# Patient Record
Sex: FEMALE | Race: WHITE | Employment: OTHER | URBAN - METROPOLITAN AREA
[De-identification: names, ages, dates, MRNs, and addresses within clinical notes are randomized per-mention and may not be internally consistent; named-entity substitution may affect disease eponyms.]

---

## 2017-01-02 ENCOUNTER — OFFICE VISIT (OUTPATIENT)
Dept: WOUND CARE | Facility: HOSPITAL | Age: 67
End: 2017-01-02
Attending: NURSE PRACTITIONER

## 2017-01-02 DIAGNOSIS — IMO0001 CHRONIC VENOUS HYPERTENSION WITH ULCER INVOLVING LEFT SIDE: Primary | ICD-10-CM

## 2017-01-02 DIAGNOSIS — I73.9 PERIPHERAL VASCULAR DISEASE, UNSPECIFIED (HCC): ICD-10-CM

## 2017-01-02 DIAGNOSIS — I87.312 CHRONIC VENOUS HYPERTENSION (IDIOPATHIC) WITH ULCER OF LEFT LOWER EXTREMITY (HCC): ICD-10-CM

## 2017-01-02 DIAGNOSIS — L97.929 CHRONIC VENOUS HYPERTENSION (IDIOPATHIC) WITH ULCER OF LEFT LOWER EXTREMITY (HCC): ICD-10-CM

## 2017-01-02 PROCEDURE — 29581 APPL MULTLAYER CMPRN SYS LEG: CPT

## 2017-01-04 ENCOUNTER — TELEPHONE (OUTPATIENT)
Dept: FAMILY MEDICINE CLINIC | Facility: CLINIC | Age: 67
End: 2017-01-04

## 2017-01-04 DIAGNOSIS — I10 HYPERTENSION GOAL BP (BLOOD PRESSURE) < 130/80: Primary | Chronic | ICD-10-CM

## 2017-01-04 RX ORDER — VERAPAMIL HYDROCHLORIDE 240 MG/1
240 CAPSULE, EXTENDED RELEASE ORAL NIGHTLY
Qty: 90 CAPSULE | Refills: 0 | Status: SHIPPED | OUTPATIENT
Start: 2017-01-04 | End: 2017-04-07

## 2017-01-04 NOTE — TELEPHONE ENCOUNTER
Gina Biswas is requesting a refill of Verapamil  mg. LOV 11/19/16. Has upcoming visit 2/22/17. Last CMP was 11/11/16. Medication refilled per protocol and  order #90 with 0 additional refills to [facility].

## 2017-01-04 NOTE — TELEPHONE ENCOUNTER
Patient needs her medication refilled and called into [facility]. Verapamil HCl (Tab CR) CALAN- MG     Please call if you have any questions.

## 2017-01-05 ENCOUNTER — OFFICE VISIT (OUTPATIENT)
Dept: WOUND CARE | Facility: HOSPITAL | Age: 67
End: 2017-01-05
Attending: NURSE PRACTITIONER

## 2017-01-05 DIAGNOSIS — L97.929 CHRONIC VENOUS HYPERTENSION (IDIOPATHIC) WITH ULCER OF LEFT LOWER EXTREMITY (HCC): ICD-10-CM

## 2017-01-05 DIAGNOSIS — I87.312 CHRONIC VENOUS HYPERTENSION (IDIOPATHIC) WITH ULCER OF LEFT LOWER EXTREMITY (HCC): ICD-10-CM

## 2017-01-05 DIAGNOSIS — I73.9 PERIPHERAL VASCULAR DISEASE, UNSPECIFIED (HCC): ICD-10-CM

## 2017-01-05 DIAGNOSIS — IMO0001 CHRONIC VENOUS HYPERTENSION WITH ULCER INVOLVING LEFT SIDE: Primary | ICD-10-CM

## 2017-01-05 PROCEDURE — 29581 APPL MULTLAYER CMPRN SYS LEG: CPT

## 2017-01-05 PROCEDURE — 97597 DBRDMT OPN WND 1ST 20 CM/<: CPT

## 2017-01-05 PROCEDURE — 97598 DBRDMT OPN WND ADDL 20CM/<: CPT

## 2017-01-05 NOTE — PROGRESS NOTES
Progress Note Details  Patient Name: Kaelyn Andres Date: 1/5/2017   Patient Number: [id number] Physician / Shawn Garcia: Milka Galvan   Patient YOB: 1950 Facility: [facility]    Chief Complaint  This information was obta 11/18/16 was switched to silver gel and ace wrap. Area is doing better. 11/25/16 Compliance an issue. discussed at length and agreeable to unna  12/2/2016 Kept compression on. continued treatment plan.   12/13/16 Miconazole powder, Silver restore, compr benazepril 20 mg tablet oral tablet oral  atenolol 50 mg tablet oral 1 1 tablet oral once daily  verapamil ER (SR) 240 mg tablet,extended release oral 1 1 tablet extended release oral once daily  clindamycin 300 mg capsule oral 1 1 capsule oral every 6 edson The periwound skin exhibited: Edema, Hemosiderosis. The periwound skin did not exhibit: Moist, Maceration. The temperature of the periwound skin is WNL. Periwound skin does not exhibit signs or symptoms of infection. Local Pulse is Weak.     Psychiatric:  J Wound #1 (Diabetic Ulcer) is located on the left, anterior lower leg. A Multi-Layer Compression Wrap procedure was performed by Aurora Banks RN. A 2 Layers Unna Boot was applied with moderate 15-25 mmhg. The procedure was tolerated well.         Plan

## 2017-01-09 ENCOUNTER — OFFICE VISIT (OUTPATIENT)
Dept: WOUND CARE | Facility: HOSPITAL | Age: 67
End: 2017-01-09
Attending: NURSE PRACTITIONER
Payer: MEDICARE

## 2017-01-09 DIAGNOSIS — IMO0001 CHRONIC VENOUS HYPERTENSION WITH ULCER INVOLVING LEFT SIDE: Primary | ICD-10-CM

## 2017-01-09 DIAGNOSIS — L97.929 CHRONIC VENOUS HYPERTENSION (IDIOPATHIC) WITH ULCER OF LEFT LOWER EXTREMITY (HCC): ICD-10-CM

## 2017-01-09 DIAGNOSIS — I73.9 PERIPHERAL VASCULAR DISEASE, UNSPECIFIED (HCC): ICD-10-CM

## 2017-01-09 DIAGNOSIS — I87.312 CHRONIC VENOUS HYPERTENSION (IDIOPATHIC) WITH ULCER OF LEFT LOWER EXTREMITY (HCC): ICD-10-CM

## 2017-01-09 PROCEDURE — 29581 APPL MULTLAYER CMPRN SYS LEG: CPT

## 2017-01-12 ENCOUNTER — OFFICE VISIT (OUTPATIENT)
Dept: WOUND CARE | Facility: HOSPITAL | Age: 67
End: 2017-01-12
Attending: NURSE PRACTITIONER
Payer: MEDICARE

## 2017-01-12 DIAGNOSIS — L97.929 CHRONIC VENOUS HYPERTENSION (IDIOPATHIC) WITH ULCER OF LEFT LOWER EXTREMITY (HCC): ICD-10-CM

## 2017-01-12 DIAGNOSIS — I87.312 CHRONIC VENOUS HYPERTENSION (IDIOPATHIC) WITH ULCER OF LEFT LOWER EXTREMITY (HCC): ICD-10-CM

## 2017-01-12 DIAGNOSIS — R60.1 GENERALIZED EDEMA: ICD-10-CM

## 2017-01-12 DIAGNOSIS — I73.9 PERIPHERAL VASCULAR DISEASE, UNSPECIFIED (HCC): ICD-10-CM

## 2017-01-12 DIAGNOSIS — IMO0001 CHRONIC VENOUS HYPERTENSION WITH ULCER INVOLVING LEFT SIDE: Primary | ICD-10-CM

## 2017-01-12 PROCEDURE — 29581 APPL MULTLAYER CMPRN SYS LEG: CPT

## 2017-01-12 PROCEDURE — 97597 DBRDMT OPN WND 1ST 20 CM/<: CPT

## 2017-01-13 NOTE — PROGRESS NOTES
Progress Note Details  Patient Name: Babs Cotter Date: 1/12/2017   Patient Number: [id number] Physician / Rizwana Li: Callie Chu   Patient YOB: 1950 Facility: [facility]    Chief Complaint  This information was obt 11/18/16 was switched to silver gel and ace wrap. Area is doing better. 11/25/16 Compliance an issue. discussed at length and agreeable to unna  12/2/2016 Kept compression on. continued treatment plan.   12/13/16 Miconazole powder, Silver restore, compr atorvastatin 40 mg tablet oral 1 1 tablet oral once daily  benazepril 20 mg tablet oral tablet oral  atenolol 50 mg tablet oral 1 1 tablet oral once daily  verapamil ER (SR) 240 mg tablet,extended release oral 1 1 tablet extended release oral once daily  M The periwound skin exhibited: Hemosiderosis. The periwound skin did not exhibit: Edema, Moist, Maceration. The temperature of the periwound skin is WNL. Periwound skin does not exhibit signs or symptoms of infection. Local Pulse is Weak.     Psychiatric:  J Wound #1 (Diabetic Ulcer) is located on the left, anterior lower leg. A Multi-Layer Compression Wrap procedure was performed by Dawit Whittaker [address] [place] [address] Amelie Kaba. A 2 Layers [place] was applied with . The procedure was tolerated well. Plan    Wound Orders:   Wo

## 2017-01-17 ENCOUNTER — OFFICE VISIT (OUTPATIENT)
Dept: WOUND CARE | Facility: HOSPITAL | Age: 67
End: 2017-01-17
Attending: NURSE PRACTITIONER
Payer: MEDICARE

## 2017-01-17 DIAGNOSIS — I87.312 CHRONIC VENOUS HYPERTENSION (IDIOPATHIC) WITH ULCER OF LEFT LOWER EXTREMITY (HCC): ICD-10-CM

## 2017-01-17 DIAGNOSIS — L97.929 CHRONIC VENOUS HYPERTENSION (IDIOPATHIC) WITH ULCER OF LEFT LOWER EXTREMITY (HCC): ICD-10-CM

## 2017-01-17 DIAGNOSIS — IMO0001 CHRONIC VENOUS HYPERTENSION WITH ULCER INVOLVING LEFT SIDE: Primary | ICD-10-CM

## 2017-01-17 DIAGNOSIS — I73.9 PERIPHERAL VASCULAR DISEASE, UNSPECIFIED (HCC): ICD-10-CM

## 2017-01-17 PROCEDURE — 29581 APPL MULTLAYER CMPRN SYS LEG: CPT

## 2017-01-17 PROCEDURE — 97597 DBRDMT OPN WND 1ST 20 CM/<: CPT

## 2017-01-24 ENCOUNTER — OFFICE VISIT (OUTPATIENT)
Dept: WOUND CARE | Facility: HOSPITAL | Age: 67
End: 2017-01-24
Attending: NURSE PRACTITIONER
Payer: MEDICARE

## 2017-01-24 ENCOUNTER — TELEPHONE (OUTPATIENT)
Dept: FAMILY MEDICINE CLINIC | Facility: CLINIC | Age: 67
End: 2017-01-24

## 2017-01-24 DIAGNOSIS — L97.929 CHRONIC VENOUS HYPERTENSION (IDIOPATHIC) WITH ULCER OF LEFT LOWER EXTREMITY (HCC): ICD-10-CM

## 2017-01-24 DIAGNOSIS — I87.312 CHRONIC VENOUS HYPERTENSION (IDIOPATHIC) WITH ULCER OF LEFT LOWER EXTREMITY (HCC): ICD-10-CM

## 2017-01-24 DIAGNOSIS — IMO0001 CHRONIC VENOUS HYPERTENSION WITH ULCER INVOLVING LEFT SIDE: Primary | ICD-10-CM

## 2017-01-24 DIAGNOSIS — R29.898 WEAKNESS OF LEFT LEG: Primary | ICD-10-CM

## 2017-01-24 DIAGNOSIS — M62.58 MUSCLE ATROPHY OF LOWER EXTREMITY: ICD-10-CM

## 2017-01-24 DIAGNOSIS — I73.9 PERIPHERAL VASCULAR DISEASE, UNSPECIFIED (HCC): ICD-10-CM

## 2017-01-24 PROCEDURE — 99213 OFFICE O/P EST LOW 20 MIN: CPT

## 2017-01-24 NOTE — PROGRESS NOTES
Progress Note Details  Patient Name: Singh Dee Date: 1/24/2017   Patient Number: [id number] Physician / Thomas Obrien: Kenji Nieto   Patient YOB: 1950 Facility: [facility]    Chief Complaint  This information was obt and agreeable to unna  12/2/2016 Kept compression on. continued treatment plan. 12/13/16 Miconazole powder, Silver restore, compression. Clindamycin. 12/22/16: Improvement with miconazole powder. Seeing Pinsky in January.   12/29/16: Increased epithelial tablet oral  atenolol 50 mg tablet oral 1 1 tablet oral once daily  verapamil ER (SR) 240 mg tablet,extended release oral 1 1 tablet extended release oral once daily  Multiple Vitamins tablet oral 1 1 tablet oral once daily  clopidogrel 75 mg tablet oral 1 interactions and affect.         Assessment    Active Problems    ICD-10  (Encounter Diagnosis) I73.9 - Peripheral vascular disease, unspecified  (Encounter Diagnosis) I87.312 - Chronic venous hypertension (idiopathic) with ulcer of left lower extremity  (E she was placed in double layer tubigrip. Verbalized understanding.   Electronic Signature(s)   Signed By:  Date:    Pooja Lanier MSN, FNP-BC, CWON, CHRN 01/24/2017 61:66:06        Entered By: Pooja Lanier on 01/24/2017 09:14:16

## 2017-01-24 NOTE — TELEPHONE ENCOUNTER
Pt called to ask if Dr Donavan Severe would order some PT sessions for left leg muscle atrophy. Pt is interested in [facility], but with insurance Pt has, she must use [address] bebe Fajardo in referral. Will you approve PT pended order? Routed to Dr Donavan Severe.

## 2017-01-31 ENCOUNTER — OFFICE VISIT (OUTPATIENT)
Dept: WOUND CARE | Facility: HOSPITAL | Age: 67
End: 2017-01-31
Attending: NURSE PRACTITIONER
Payer: MEDICARE

## 2017-01-31 DIAGNOSIS — I73.9 PERIPHERAL VASCULAR DISEASE, UNSPECIFIED (HCC): ICD-10-CM

## 2017-01-31 DIAGNOSIS — IMO0001 CHRONIC VENOUS HYPERTENSION WITH ULCER INVOLVING LEFT SIDE: Primary | ICD-10-CM

## 2017-01-31 DIAGNOSIS — L97.929 CHRONIC VENOUS HYPERTENSION (IDIOPATHIC) WITH ULCER OF LEFT LOWER EXTREMITY (HCC): ICD-10-CM

## 2017-01-31 DIAGNOSIS — I87.312 CHRONIC VENOUS HYPERTENSION (IDIOPATHIC) WITH ULCER OF LEFT LOWER EXTREMITY (HCC): ICD-10-CM

## 2017-01-31 PROCEDURE — 99214 OFFICE O/P EST MOD 30 MIN: CPT

## 2017-01-31 PROCEDURE — 29581 APPL MULTLAYER CMPRN SYS LEG: CPT

## 2017-01-31 NOTE — PROGRESS NOTES
Progress Note Details  Patient Name: Coleman Pereira Date: 1/31/2017   Patient Number: [id number] Physician / Rolando Bermudez: Faizan Saini   Patient YOB: 1950 Facility: [facility]    Chief Complaint  This information was obt 11/25/16 Compliance an issue. discussed at length and agreeable to unna  12/2/2016 Kept compression on. continued treatment plan. 12/13/16 Miconazole powder, Silver restore, compression. Clindamycin. 12/22/16: Improvement with miconazole powder.  Seeing P Actos 45 mg tablet oral 1 1 tablet oral once daily  atorvastatin 40 mg tablet oral 1 1 tablet oral once daily  benazepril 20 mg tablet oral tablet oral  atenolol 50 mg tablet oral 1 1 tablet oral once daily  verapamil ER (SR) 240 mg tablet,extended release The periwound skin color is normal. The periwound skin exhibited: Edema. The periwound skin did not exhibit: Moist. The temperature of the periwound skin is Warm. Periwound skin does not exhibit signs or symptoms of infection.     Psychiatric:  Judgment and Patient doing well. New ulcer opened up to the left lower posterior leg. Concern is the increased edema due to the removal of the compression wrap. She will be placed back into the compression wrap for this next week.  She will be traveling to [facility] on Feb

## 2017-02-01 ENCOUNTER — TELEPHONE (OUTPATIENT)
Dept: FAMILY MEDICINE CLINIC | Facility: CLINIC | Age: 67
End: 2017-02-01

## 2017-02-01 NOTE — TELEPHONE ENCOUNTER
Patient is needing a referral covering all her wound care visits which started in October 2016.  Patient received a bill for over $6000.00 because her insurance denied all her visits as there was no referral

## 2017-02-02 PROBLEM — B35.1 ONYCHOMYCOSIS: Status: ACTIVE | Noted: 2017-02-02

## 2017-02-02 PROBLEM — E11.8 TYPE 2 DIABETES MELLITUS WITH COMPLICATION, WITHOUT LONG-TERM CURRENT USE OF INSULIN (HCC): Status: ACTIVE | Noted: 2017-02-02

## 2017-02-07 ENCOUNTER — OFFICE VISIT (OUTPATIENT)
Dept: WOUND CARE | Facility: HOSPITAL | Age: 67
End: 2017-02-07
Attending: NURSE PRACTITIONER
Payer: MEDICARE

## 2017-02-07 DIAGNOSIS — I87.312 CHRONIC VENOUS HYPERTENSION (IDIOPATHIC) WITH ULCER OF LEFT LOWER EXTREMITY (HCC): ICD-10-CM

## 2017-02-07 DIAGNOSIS — L97.929 CHRONIC VENOUS HYPERTENSION (IDIOPATHIC) WITH ULCER OF LEFT LOWER EXTREMITY (HCC): ICD-10-CM

## 2017-02-07 DIAGNOSIS — I73.9 PERIPHERAL VASCULAR DISEASE, UNSPECIFIED (HCC): ICD-10-CM

## 2017-02-07 DIAGNOSIS — IMO0001 CHRONIC VENOUS HYPERTENSION WITH ULCER INVOLVING LEFT SIDE: Primary | ICD-10-CM

## 2017-02-07 PROCEDURE — 29581 APPL MULTLAYER CMPRN SYS LEG: CPT

## 2017-02-07 NOTE — PROGRESS NOTES
Progress Note Details  Patient Name: Brianda Dailey Date: 2/7/2017   Patient Number: [id number] Physician / Alexus Herr: Alberto Whitaker   Patient YOB: 1950 Facility: [facility]    Chief Complaint  This information was obta 11/25/16 Compliance an issue. discussed at length and agreeable to unna  12/2/2016 Kept compression on. continued treatment plan. 12/13/16 Miconazole powder, Silver restore, compression. Clindamycin. 12/22/16: Improvement with miconazole powder.  Seeing P metformin 1,000 mg tablet oral 1 1 tablet oral twice daily  Amaryl 4 mg tablet oral 1 1 tablet oral once daily  Actos 45 mg tablet oral 1 1 tablet oral once daily  atorvastatin 40 mg tablet oral 1 1 tablet oral once daily  benazepril 20 mg tablet oral tabl The periwound skin color is normal. The periwound skin exhibited: Edema, Dry/Scaly. The periwound skin did not exhibit: Moist. The temperature of the periwound skin is Warm. Periwound skin does not exhibit signs or symptoms of infection.     Psychiatric:  J Follow-Up Appointments  Other: - Make an appointment when you return if the ulcer opens up again. Enjoy your trip!!!! Bring some sunshine back with you! Follow-Up Appointments:  A follow-up appointment should be scheduled. Patient doing well.

## 2017-02-09 NOTE — TELEPHONE ENCOUNTER
EPIC reviewed, it appears referrals we entered for Wound Care and approved, msg forwarded to Referral Dept to see if they can help.

## 2017-02-21 ENCOUNTER — HOSPITAL ENCOUNTER (OUTPATIENT)
Dept: MAMMOGRAPHY | Facility: HOSPITAL | Age: 67
Discharge: HOME | End: 2017-02-21
Attending: INTERNAL MEDICINE
Payer: MEDICARE

## 2017-02-21 ENCOUNTER — MA CHART PREP (OUTPATIENT)
Dept: FAMILY MEDICINE CLINIC | Facility: CLINIC | Age: 67
End: 2017-02-21

## 2017-02-21 DIAGNOSIS — Z85.3 PERSONAL HISTORY OF MALIGNANT NEOPLASM OF BREAST: ICD-10-CM

## 2017-02-21 PROBLEM — I73.9 PAD (PERIPHERAL ARTERY DISEASE) (HCC): Status: ACTIVE | Noted: 2017-02-21

## 2017-02-21 PROBLEM — Z90.49 STATUS POST CHOLECYSTECTOMY: Status: RESOLVED | Noted: 2017-02-21 | Resolved: 2017-02-21

## 2017-02-21 PROBLEM — D25.9 UTERINE FIBROID: Status: ACTIVE | Noted: 2017-02-21

## 2017-02-21 PROBLEM — I70.0 CALCIFICATION OF AORTA (HCC): Status: ACTIVE | Noted: 2017-02-21

## 2017-02-21 PROBLEM — N28.1 BILATERAL RENAL CYSTS: Status: ACTIVE | Noted: 2017-02-21

## 2017-02-21 PROBLEM — E11.8 TYPE 2 DIABETES MELLITUS WITH COMPLICATION, WITHOUT LONG-TERM CURRENT USE OF INSULIN (HCC): Status: RESOLVED | Noted: 2017-02-02 | Resolved: 2017-02-21

## 2017-02-21 PROBLEM — Z90.49 STATUS POST CHOLECYSTECTOMY: Status: ACTIVE | Noted: 2017-02-21

## 2017-02-21 PROBLEM — K76.0 FATTY INFILTRATION OF LIVER: Status: ACTIVE | Noted: 2017-02-21

## 2017-02-21 PROBLEM — I70.0 ATHEROSCLEROSIS OF AORTA (HCC): Status: ACTIVE | Noted: 2017-02-21

## 2017-02-21 PROBLEM — I77.9 BILATERAL CAROTID ARTERY DISEASE (HCC): Status: ACTIVE | Noted: 2017-02-21

## 2017-02-21 PROBLEM — M81.0 OSTEOPOROSIS: Status: ACTIVE | Noted: 2017-02-21

## 2017-02-21 PROBLEM — I05.8 MITRAL VALVE SCLEROSIS: Status: ACTIVE | Noted: 2017-02-21

## 2017-02-21 PROBLEM — D25.9 UTERINE FIBROID: Status: RESOLVED | Noted: 2017-02-21 | Resolved: 2017-02-21

## 2017-02-21 PROBLEM — I05.9 MITRAL ANNULAR CALCIFICATION: Status: ACTIVE | Noted: 2017-02-21

## 2017-02-21 PROBLEM — I77.1 TORTUOUS AORTA (HCC): Status: ACTIVE | Noted: 2017-02-21

## 2017-02-21 PROCEDURE — 77062 BREAST TOMOSYNTHESIS BI: CPT

## 2017-02-21 PROCEDURE — 76642 ULTRASOUND BREAST LIMITED: CPT

## 2017-02-21 PROCEDURE — 77066 DX MAMMO INCL CAD BI: CPT

## 2017-02-23 NOTE — TELEPHONE ENCOUNTER
Pt called requesting prior wound care referrals to be sent to [facility] as pt has received bills for these services. I faxed over referrals to [facility] fax # that was provided by pt and fax was sent back stating misrouted department.   Pt was called and advised

## 2017-02-24 PROBLEM — N28.1 BILATERAL RENAL CYSTS: Status: RESOLVED | Noted: 2017-02-21 | Resolved: 2017-02-24

## 2017-02-24 PROBLEM — K76.0 FATTY INFILTRATION OF LIVER: Status: RESOLVED | Noted: 2017-02-21 | Resolved: 2017-02-24

## 2017-02-27 ENCOUNTER — OFFICE VISIT (OUTPATIENT)
Dept: WOUND CARE | Facility: HOSPITAL | Age: 67
End: 2017-02-27
Attending: NURSE PRACTITIONER
Payer: MEDICARE

## 2017-02-27 DIAGNOSIS — IMO0001 CHRONIC VENOUS HYPERTENSION WITH ULCER INVOLVING LEFT SIDE: Primary | ICD-10-CM

## 2017-02-27 DIAGNOSIS — I73.9 PERIPHERAL VASCULAR DISEASE, UNSPECIFIED (HCC): ICD-10-CM

## 2017-02-27 PROCEDURE — 99214 OFFICE O/P EST MOD 30 MIN: CPT

## 2017-02-27 PROCEDURE — 29581 APPL MULTLAYER CMPRN SYS LEG: CPT

## 2017-02-27 NOTE — PROGRESS NOTES
Progress Note Details  Patient Name: Chaka Tobias Date: 2/27/2017   Patient Number: [id number] Physician / Niraj Almaraz: Pa Ridley   Patient YOB: 1950 Facility: [facility]    Chief Complaint  This information was ob 11/25/16 Compliance an issue. discussed at length and agreeable to unna  12/2/2016 Kept compression on. continued treatment plan. 12/13/16 Miconazole powder, Silver restore, compression. Clindamycin. 12/22/16: Improvement with miconazole powder.  Seeing P Weekly Nutrition Assessment  Does Patient describe adequate dietary intake?: Yes  4 servings protein daily?: Yes  5 servings fruit/veg daily? : Yes  8 – 10 cups water daily? : Yes  Patient on prescription diet?: Diabetic Diet  Patient taking supplements? : The periwound skin exhibited: Edema, Dry/Scaly. The periwound skin did not exhibit: Moist. The temperature of the periwound skin is Warm. Periwound skin does not exhibit signs or symptoms of infection.  Local Pulse is Normal.  Psychiatric:  Intact judgement Change dressing every: - once a week at the clinic. Compression Therapy:  Coflex calamine unna boot  Compression to Left Leg  Avoid prolonged standing in one place. Elevate leg(s)as much as possible.     Follow-Up Appointments  Return Appointment in 1 w

## 2017-03-06 ENCOUNTER — OFFICE VISIT (OUTPATIENT)
Dept: WOUND CARE | Facility: HOSPITAL | Age: 67
End: 2017-03-06
Attending: NURSE PRACTITIONER
Payer: MEDICARE

## 2017-03-06 DIAGNOSIS — IMO0001 CHRONIC VENOUS HYPERTENSION WITH ULCER INVOLVING LEFT SIDE: Primary | ICD-10-CM

## 2017-03-06 DIAGNOSIS — I73.9 PERIPHERAL VASCULAR DISEASE, UNSPECIFIED (HCC): ICD-10-CM

## 2017-03-06 DIAGNOSIS — L97.929 CHRONIC VENOUS HYPERTENSION (IDIOPATHIC) WITH ULCER OF LEFT LOWER EXTREMITY (HCC): ICD-10-CM

## 2017-03-06 DIAGNOSIS — I87.312 CHRONIC VENOUS HYPERTENSION (IDIOPATHIC) WITH ULCER OF LEFT LOWER EXTREMITY (HCC): ICD-10-CM

## 2017-03-06 PROCEDURE — 29581 APPL MULTLAYER CMPRN SYS LEG: CPT

## 2017-03-06 NOTE — PROGRESS NOTES
Progress Note Details  Patient Name: Benita Ledesma Date: 3/6/2017   Patient Number: [id number] Physician / Malik Gone: Latonia Huang   Patient YOB: 1950 Facility: [facility]    Chief Complaint  This information was obt 11/25/16 Compliance an issue. discussed at length and agreeable to unna  12/2/2016 Kept compression on. continued treatment plan. 12/13/16 Miconazole powder, Silver restore, compression. Clindamycin. 12/22/16: Improvement with miconazole powder.  Seeing P Cardiovascular (Central/Peripheral):  Intermittent Claudication, Lower extremity (leg) resting pain  Gastrointestinal (GI): Loss of Appetite  Musculoskeletal: Muscle Weakness  Psychiatric: Anxiety    Weekly Nutrition Assessment    Weekly Nutrition Assessmen Wound #2 Left, Posterior Lower Leg is an acute [place] Grade 1 Diabetic Ulcer and has received a status of Not Healed. Subsequent wound encounter measurements are 0.8cm length x 0.5cm width with no measurable depth, with an area of 0.4 sq cm .  No tunneling Wound #2 (Diabetic Ulcer) is located on the left, posterior lower leg. A Multi-Layer Compression Wrap procedure was performed. A 2 Layers Unna Boot was applied with moderate 15-25 mmhg. The procedure was tolerated well.    General Notes:  calamine unna

## 2017-03-13 ENCOUNTER — OFFICE VISIT (OUTPATIENT)
Dept: WOUND CARE | Facility: HOSPITAL | Age: 67
End: 2017-03-13
Attending: NURSE PRACTITIONER
Payer: MEDICARE

## 2017-03-13 DIAGNOSIS — L97.929 CHRONIC VENOUS HYPERTENSION (IDIOPATHIC) WITH ULCER OF LEFT LOWER EXTREMITY (HCC): ICD-10-CM

## 2017-03-13 DIAGNOSIS — I87.312 CHRONIC VENOUS HYPERTENSION (IDIOPATHIC) WITH ULCER OF LEFT LOWER EXTREMITY (HCC): ICD-10-CM

## 2017-03-13 DIAGNOSIS — IMO0001 CHRONIC VENOUS HYPERTENSION WITH ULCER INVOLVING LEFT SIDE: Primary | ICD-10-CM

## 2017-03-13 DIAGNOSIS — I73.9 PERIPHERAL VASCULAR DISEASE, UNSPECIFIED (HCC): ICD-10-CM

## 2017-03-13 PROCEDURE — 99213 OFFICE O/P EST LOW 20 MIN: CPT

## 2017-03-13 NOTE — PROGRESS NOTES
Progress Note Details  Patient Name: Alireza Oriental Date: 3/13/2017   Patient Number: [id number] Physician / Lenny Herrera: Kirby Coley   Patient YOB: 1950 Facility: [facility]    Chief Complaint  This information was ob 11/18/16 was switched to silver gel and ace wrap. Area is doing better. 11/25/16 Compliance an issue. discussed at length and agreeable to unna  12/2/2016 Kept compression on. continued treatment plan.   12/13/16 Miconazole powder, Silver restore, compr Constitutional Symptoms (General Health): Fever, Loss of Appetite, Chills  Cardiovascular (Central/Peripheral):  Intermittent Claudication, Lower extremity (leg) resting pain  Gastrointestinal (GI): Loss of Appetite  Musculoskeletal: Muscle Weakness  Integu The periwound skin exhibited: Edema.  The periwound skin did not exhibit: Brawny Induration, Excoriation, Induration, Callus, Crepitus, Fluctuance, Friable, Rash, Dry/Scaly, Moist, Maceration, Atrophie Renee, Cyanosis, Ecchymosis, Erythema, Hemosiderosis,

## 2017-03-21 ENCOUNTER — TELEPHONE (OUTPATIENT)
Dept: FAMILY MEDICINE CLINIC | Facility: CLINIC | Age: 67
End: 2017-03-21

## 2017-03-21 DIAGNOSIS — I73.9 PERIPHERAL VASCULAR DISEASE (HCC): Primary | ICD-10-CM

## 2017-03-21 DIAGNOSIS — IMO0001 CHRONIC VENOUS HYPERTENSION WITH ULCER INVOLVING LEFT SIDE: ICD-10-CM

## 2017-03-21 NOTE — TELEPHONE ENCOUNTER
Patient needs a referral for THE [facility] wound clinic they are telling her she is not covered without one. There was one put in on 12/20/16 for 8 visits patient believes that there are no more visits approved. Please call with questions.  She is very upset and

## 2017-03-21 NOTE — TELEPHONE ENCOUNTER
Spoke with Joe Jose. She needs a retro-referral for visits that have already occurred and no visits, going forward, are needed at this time. Spoke with Farheen Ferguson at referrals.   Looking at [facility] chart, she has had referrals for 12 visits and 24 visits

## 2017-03-27 ENCOUNTER — TELEPHONE (OUTPATIENT)
Dept: FAMILY MEDICINE CLINIC | Facility: CLINIC | Age: 67
End: 2017-03-27

## 2017-03-27 DIAGNOSIS — E11.65 UNCONTROLLED TYPE 2 DIABETES MELLITUS WITH INSULIN THERAPY (HCC): Primary | Chronic | ICD-10-CM

## 2017-03-27 DIAGNOSIS — I70.0 ATHEROSCLEROSIS OF AORTA (HCC): ICD-10-CM

## 2017-03-27 DIAGNOSIS — L97.322 CHRONIC ULCER OF ANKLE, LEFT, WITH FAT LAYER EXPOSED (HCC): ICD-10-CM

## 2017-03-27 DIAGNOSIS — E11.40 TYPE 2 DIABETES, CONTROLLED, WITH NEUROPATHY (HCC): Chronic | ICD-10-CM

## 2017-03-27 DIAGNOSIS — Z79.4 UNCONTROLLED TYPE 2 DIABETES MELLITUS WITH INSULIN THERAPY (HCC): Primary | Chronic | ICD-10-CM

## 2017-03-27 NOTE — TELEPHONE ENCOUNTER
Patient is calling regarding her wound. She said it has opened up again and she needs a new referral.. Please call her back with direction.

## 2017-03-27 NOTE — TELEPHONE ENCOUNTER
The previous referral had run out called and talked to patient her wound has reopened and is draining she would like a new referral to the wound clinic.  There was some question on the last referral called Gerson Ray about this [facility] to have her call back

## 2017-04-06 PROBLEM — B35.1 ONYCHOMYCOSIS: Status: RESOLVED | Noted: 2017-02-02 | Resolved: 2017-04-06

## 2017-04-06 PROBLEM — I70.0 ATHEROSCLEROSIS OF AORTA (HCC): Chronic | Status: ACTIVE | Noted: 2017-02-21

## 2017-04-07 ENCOUNTER — OFFICE VISIT (OUTPATIENT)
Dept: FAMILY MEDICINE CLINIC | Facility: CLINIC | Age: 67
End: 2017-04-07

## 2017-04-07 VITALS
RESPIRATION RATE: 16 BRPM | SYSTOLIC BLOOD PRESSURE: 132 MMHG | DIASTOLIC BLOOD PRESSURE: 74 MMHG | HEART RATE: 80 BPM | WEIGHT: 182 LBS | TEMPERATURE: 97 F | HEIGHT: 64 IN | BODY MASS INDEX: 31.07 KG/M2

## 2017-04-07 DIAGNOSIS — G47.33 OBSTRUCTIVE SLEEP APNEA: Chronic | ICD-10-CM

## 2017-04-07 DIAGNOSIS — S32.000D LUMBAR COMPRESSION FRACTURE, WITH ROUTINE HEALING, SUBSEQUENT ENCOUNTER: ICD-10-CM

## 2017-04-07 DIAGNOSIS — I70.0 ATHEROSCLEROSIS OF AORTA (HCC): Chronic | ICD-10-CM

## 2017-04-07 DIAGNOSIS — I25.10 CAD, MULTIPLE VESSEL: Chronic | ICD-10-CM

## 2017-04-07 DIAGNOSIS — E55.9 VITAMIN D DEFICIENCY: ICD-10-CM

## 2017-04-07 DIAGNOSIS — R27.8 TRUNCAL ATAXIA: ICD-10-CM

## 2017-04-07 DIAGNOSIS — D32.9 BENIGN MENINGIOMA (HCC): ICD-10-CM

## 2017-04-07 DIAGNOSIS — I73.9 PAD (PERIPHERAL ARTERY DISEASE) (HCC): ICD-10-CM

## 2017-04-07 DIAGNOSIS — Z86.73 HISTORY OF CEREBRAL INFARCTION: ICD-10-CM

## 2017-04-07 DIAGNOSIS — E11.65 UNCONTROLLED TYPE 2 DIABETES MELLITUS WITH INSULIN THERAPY (HCC): Chronic | ICD-10-CM

## 2017-04-07 DIAGNOSIS — E87.1 HYPONATREMIA: ICD-10-CM

## 2017-04-07 DIAGNOSIS — L97.322 CHRONIC ULCER OF ANKLE, LEFT, WITH FAT LAYER EXPOSED (HCC): ICD-10-CM

## 2017-04-07 DIAGNOSIS — Z79.84 LONG TERM CURRENT USE OF ORAL HYPOGLYCEMIC DRUG: ICD-10-CM

## 2017-04-07 DIAGNOSIS — I67.2 CEREBRAL ATHEROSCLEROSIS: ICD-10-CM

## 2017-04-07 DIAGNOSIS — E11.40 TYPE 2 DIABETES, CONTROLLED, WITH NEUROPATHY (HCC): Chronic | ICD-10-CM

## 2017-04-07 DIAGNOSIS — M25.551 RIGHT HIP PAIN: ICD-10-CM

## 2017-04-07 DIAGNOSIS — E11.319 DIABETIC RETINOPATHY OF BOTH EYES ASSOCIATED WITH TYPE 2 DIABETES MELLITUS, MACULAR EDEMA PRESENCE UNSPECIFIED, UNSPECIFIED RETINOPATHY SEVERITY (HCC): Chronic | ICD-10-CM

## 2017-04-07 DIAGNOSIS — M81.6 LOCALIZED OSTEOPOROSIS WITHOUT CURRENT PATHOLOGICAL FRACTURE: ICD-10-CM

## 2017-04-07 DIAGNOSIS — I10 HYPERTENSION GOAL BP (BLOOD PRESSURE) < 130/80: Chronic | ICD-10-CM

## 2017-04-07 DIAGNOSIS — I69.351 HEMIPARESIS AFFECTING RIGHT SIDE AS LATE EFFECT OF STROKE (HCC): ICD-10-CM

## 2017-04-07 DIAGNOSIS — C50.312 MALIGNANT NEOPLASM OF LOWER-INNER QUADRANT OF LEFT FEMALE BREAST (HCC): Chronic | ICD-10-CM

## 2017-04-07 DIAGNOSIS — I77.9 BILATERAL CAROTID ARTERY DISEASE (HCC): ICD-10-CM

## 2017-04-07 DIAGNOSIS — Z79.4 UNCONTROLLED TYPE 2 DIABETES MELLITUS WITH INSULIN THERAPY (HCC): Chronic | ICD-10-CM

## 2017-04-07 DIAGNOSIS — F32.1 MODERATE SINGLE CURRENT EPISODE OF MAJOR DEPRESSIVE DISORDER (HCC): ICD-10-CM

## 2017-04-07 DIAGNOSIS — E78.5 HYPERLIPIDEMIA LDL GOAL <100: Chronic | ICD-10-CM

## 2017-04-07 DIAGNOSIS — Z00.00 ANNUAL PHYSICAL EXAM: Primary | ICD-10-CM

## 2017-04-07 DIAGNOSIS — I63.9 BRAINSTEM INFARCTION (HCC): ICD-10-CM

## 2017-04-07 PROCEDURE — 96160 PT-FOCUSED HLTH RISK ASSMT: CPT | Performed by: FAMILY MEDICINE

## 2017-04-07 PROCEDURE — G0439 PPPS, SUBSEQ VISIT: HCPCS | Performed by: FAMILY MEDICINE

## 2017-04-07 RX ORDER — VERAPAMIL HYDROCHLORIDE 240 MG/1
240 CAPSULE, EXTENDED RELEASE ORAL NIGHTLY
Qty: 90 CAPSULE | Refills: 3 | Status: SHIPPED | OUTPATIENT
Start: 2017-04-07 | End: 2018-11-19

## 2017-04-07 RX ORDER — TRIAMTERENE AND HYDROCHLOROTHIAZIDE 37.5; 25 MG/1; MG/1
1 TABLET ORAL
Qty: 90 TABLET | Refills: 3 | Status: SHIPPED | OUTPATIENT
Start: 2017-04-07 | End: 2017-07-17

## 2017-04-07 RX ORDER — ERGOCALCIFEROL (VITAMIN D2) 1250 MCG
50000 CAPSULE ORAL WEEKLY
Qty: 30 CAPSULE | Status: SHIPPED | OUTPATIENT
Start: 2017-04-07 | End: 2019-06-10

## 2017-04-07 NOTE — ASSESSMENT & PLAN NOTE
Still poor control, but not ready for psychiatry, seeing counselor Isabel Burks, declines medicaitons, PHQ9 score of 13.

## 2017-04-07 NOTE — PROGRESS NOTES
HPI:   Gricelda Sexton is a 73year old female who presents for a MA (Medicare Advantage) [facility][note 3] (Once per calendar year).     Still depressed, lots of issues, and lots of speicalists, persisten ulcer with hospital ization this winter  Annual Phys * 11/11/2016        CBC  (most recent labs)     Lab Results  Component Value Date   WBC 7.5 11/11/2016   HGB 9.4* 11/11/2016   .0 11/11/2016        ALLERGIES:   She is allergic to adhesive tape.     CURRENT MEDICATIONS:     Outpatient Prescr skin daily. (Patient taking differently: Inject 16 Units into the skin daily as needed.  Only if BS > 200 )   MetFORMIN HCl (GLUCOPHAGE) 1000 MG Oral Tab TAKE ONE TABLET BY MOUTH TWICE A DAY WITH MEALS   Verapamil HCl ER (CALAN-SR) 240 MG Oral Tab CR Take 2 She  reports that she has never smoked. She has never used smokeless tobacco. She reports that she drinks alcohol. She reports that she does not use illicit drugs. REVIEW OF SYSTEMS:   Review of Systems   Constitutional: Positive for fatigue.  Negativ Right Ear: Tympanic membrane, external ear and ear canal normal.   Left Ear: Tympanic membrane, external ear and ear canal normal.   Nose: Nose normal.   Mouth/Throat: Uvula is midline, oropharynx is clear and moist and mucous membranes are normal. No or normal reflexes. No cranial nerve deficit or sensory deficit. Skin: Skin is warm, dry and intact. No rash noted. She is not diaphoretic. No cyanosis or erythema. Nails show no clubbing. Psychiatric: She has a normal mood and affect.  Her speech is barry Continue present management.     Cholesterol Lowering Medications          Atorvastatin Calcium 40 MG Oral Tab                 Atherosclerosis of aorta (HCC) (Chronic)    Overview     Seen on 9/12/12 CXR         Current Assessment & Plan     Stable, Continu Overview     focal stenosis of the proximal aspect of the left PCA         Hemiparesis affecting right side as late effect of stroke ([facility] [address]    Overview     CVA 8.2015 with right brainstem punctate lesion, [facility], and retained mild weakess with gait Dx 1995, Actos 45, metformin 1000 BID, Toujeo  , novolog, stopped glimiperide, needs closer follow up so referred to [facility] DM 5/27/2016            Current Assessment & Plan     As for her Diabetes, it is reasonably well controlled, no significant medication you had any immunizations at another office such as Influenza, Hepatitis B, Tetanus, or Pneumococcal?: Yes     Functional Ability     Bathing or Showering: Able without help    Toileting: Able without help    Dressing: Able without help    Eating: Able wit SCHEDULE Internal Lab or Procedure External Lab or Procedure   Diabetes Screening      HbgA1C   Annually HEMOGLOBIN A1C   Date Value   02/10/2017 6.7 %*   11/23/2013 11.3 % of total Hgb*     HGBA1C (%)   Date Value   08/09/2016 10.1*   12/06/2013 11.4* visit on 09/30/15  -FLU VAC NO PRSV 4 TAMIKO 3 YRS+   Orders placed or performed in visit on 11/18/14  -INFLUENZA VIRUS VACCINE, >=1YEARS OF AGE    Update Immunization Activity if applicable    Pneumoccocal 13 (Prevnar)   Orders placed or performed in visit [facility] MEDICARE ANNUAL ASSESSMENT FEMALE Ruba Winslow [[id number]]

## 2017-04-08 ENCOUNTER — TELEPHONE (OUTPATIENT)
Dept: FAMILY MEDICINE CLINIC | Facility: CLINIC | Age: 67
End: 2017-04-08

## 2017-04-08 DIAGNOSIS — M25.552 LEFT HIP PAIN: Primary | ICD-10-CM

## 2017-04-08 NOTE — TELEPHONE ENCOUNTER
Patient called and stated she noticed that her summary has orders for XR on Right Hip. Patient states orders should be corrected to Left Hip.

## 2017-04-08 NOTE — PATIENT INSTRUCTIONS
[facility] SCREENING SCHEDULE   Tests on this list are recommended by your physician but may not be covered, or covered at this frequency, by your insurer. Please check with your insurance carrier before scheduling to verify coverage.    PREV patients who meet one of the following criteria:   • Men who are 73-68 years old and have smoked more than 100 cigarettes in their lifetime   • Anyone with a family history    Colorectal Cancer Screening  Covered up to Age 76     Colonoscopy Screen   Cover Immunizations      Influenza  Covered Annually   Orders placed or performed in visit on 09/28/16  -FLU VAC NO PRSV 4 TAMIKO 3 YRS+   Orders placed or performed in visit on 09/30/15  -FLU VAC NO PRSV 4 TAMIKO 3 YRS+   Orders placed or performed in visit on 11/1 State forms available on it's website for anyone to review and print using their home computer and printer. (the forms are also available in [address])  [email or web address] org  This link also has information from the [address] regarding A

## 2017-04-08 NOTE — ASSESSMENT & PLAN NOTE
Stable, Continue present management.     Blood Pressure and Cardiac Medications          Verapamil HCl  MG Oral Capsule SR 24 Hr    Benazepril HCl 20 MG Oral Tab    atenolol 50 MG Oral Tab    Verapamil HCl ER (CALAN-SR) 240 MG Oral Tab CR

## 2017-04-08 NOTE — ASSESSMENT & PLAN NOTE
Seeing wound clinic and podiatry for chronic treatment, in and out of hospital last fall, and needs chronic follow up.    This is due to DM and PVD

## 2017-04-12 NOTE — ASSESSMENT & PLAN NOTE
As for her Diabetes, it is reasonably well controlled, no significant medication side effects noted.    Better control, A1c down to 6.7%, but still hypoglycemia at times    Blood Sugar Medications          GLIMEPIRIDE 4 MG Oral Tab    Pioglitazone HCl (ACTO

## 2017-04-17 ENCOUNTER — HOSPITAL ENCOUNTER (OUTPATIENT)
Dept: GENERAL RADIOLOGY | Facility: HOSPITAL | Age: 67
Discharge: HOME | End: 2017-04-17
Attending: FAMILY MEDICINE
Payer: MEDICARE

## 2017-04-17 DIAGNOSIS — M25.552 LEFT HIP PAIN: ICD-10-CM

## 2017-04-17 PROCEDURE — 73502 X-RAY EXAM HIP UNI 2-3 VIEWS: CPT

## 2017-05-04 PROBLEM — B35.1 ONYCHOMYCOSIS: Status: ACTIVE | Noted: 2017-05-04

## 2017-05-12 ENCOUNTER — PRIOR ORIGINAL RECORDS (OUTPATIENT)
Dept: OTHER | Age: 67
End: 2017-05-12

## 2017-05-22 ENCOUNTER — TELEPHONE (OUTPATIENT)
Dept: FAMILY MEDICINE CLINIC | Facility: CLINIC | Age: 67
End: 2017-05-22

## 2017-05-22 DIAGNOSIS — G47.33 OBSTRUCTIVE SLEEP APNEA: Primary | Chronic | ICD-10-CM

## 2017-05-22 NOTE — TELEPHONE ENCOUNTER
Patient is calling, she has sleep apnea, and is wanting a surgical procedure done because she hates the machine. The surgeon/doctor told her she needs a new referral for a new sleep study to be done. She does not remember who she had it done with.  The las

## 2017-05-22 NOTE — TELEPHONE ENCOUNTER
Pt is requesting to have a surgical procedure for sleep inspire device for sleep apnea. She states that there are doctors at [facility] that do this procedure. Pt states that she is unable to tolerate her CPAP machine.     She was told that she would need a new sl

## 2017-05-23 NOTE — TELEPHONE ENCOUNTER
First step is for patient to get a new Sleep Study - C-Pap titration only to check her levels  Referral placed and PENDING for Dr Luis Clayton approval

## 2017-06-12 NOTE — TELEPHONE ENCOUNTER
Pt asked if Dr Aaliyah He would order silver Silvadene cream which Pt uses on left leg wound. Pt no longer going to Wound clinic and is managing legs herself. Will you approve pended order ? Routed to Dr Aaliyah He.

## 2017-06-12 NOTE — TELEPHONE ENCOUNTER
Pt requesting Silver Lining Ointment for Wound on her leg. Oringinally she  got it from [address] [place] [address] but they CANNOT fill it because she is NO longer their pt. [facility]

## 2017-06-13 ENCOUNTER — OFFICE VISIT (OUTPATIENT)
Dept: NEUROLOGY | Facility: CLINIC | Age: 67
End: 2017-06-13

## 2017-06-13 ENCOUNTER — TELEPHONE (OUTPATIENT)
Dept: FAMILY MEDICINE CLINIC | Facility: CLINIC | Age: 67
End: 2017-06-13

## 2017-06-13 VITALS
SYSTOLIC BLOOD PRESSURE: 136 MMHG | RESPIRATION RATE: 16 BRPM | WEIGHT: 178 LBS | HEART RATE: 80 BPM | BODY MASS INDEX: 30 KG/M2 | DIASTOLIC BLOOD PRESSURE: 86 MMHG

## 2017-06-13 DIAGNOSIS — I63.9 BRAINSTEM INFARCTION (HCC): Primary | ICD-10-CM

## 2017-06-13 DIAGNOSIS — D32.9 BENIGN MENINGIOMA (HCC): ICD-10-CM

## 2017-06-13 PROCEDURE — 99213 OFFICE O/P EST LOW 20 MIN: CPT | Performed by: OTHER

## 2017-06-13 NOTE — PROGRESS NOTES
Neurology Outpatient    David Toney Patient Status:  No patient class for patient encounter    1950 MRN [id number]   Location [facility] Attending No att. providers found   [facility] Day #  PCP Huseyin Sinha MD     Subjective:  Thor Cramp capsule Rfl: 3   Triamterene-HCTZ 37.5-25 MG Oral Tab Take 1 tablet by mouth once daily. Disp: 90 tablet Rfl: 3   ergocalciferol 60038 units Oral Cap Take 1 capsule (50,000 Units total) by mouth once a week.  Disp: 30 capsule Rfl: prn   GLIMEPIRIDE 4 MG Ora 180 tablet Rfl: 3   Multiple Vitamin (ONE-DAILY MULTI VITAMINS) Oral Tab Take 1 tablet by mouth daily. Disp:  Rfl:    TiZANidine HCl 4 MG Oral Tab Take 1 tablet (4 mg total) by mouth every 8 (eight) hours as needed (post CVA muscle spasm pain).  Disp: 30 ta CAD in native artery 9/4/2005   • Facial cellulitis 4/14/2015   • Dental abscess 4/14/2015   • Stroke [facility])    • Diabetes [facility])        PSHx:      Past Surgical History    COLONOSCOPY  1/1/08    CATH PERCUTANEOUS  TRANSLUMINAL CORONARY ANGIOPLASTY  1/1/05 denies back pain, denies joint pain  Psych: denies depression   Skin: denies any new masses, rashes, lumps or bruising    Objective/Physical Exam:    Vital Signs:  Blood pressure 136/86, pulse 80, resp. rate 16, weight 178 lb, not currently breastfeeding. evidence for restricted diffusion. No acute infarct evident.  Extensive chronic white matter abnormality, with increased T2/flair signal within the white matter of the subcortical, deep and periventricular cerebrum, and patchy similar signal within the   po thick.     CTH 8/11/16  CONCLUSION:     1. Please refer to the brain MR from 8/8/16 for better visualization of the probable punctate acute infarct within lateral aspect of the right cervicomedullary junction.   2. No acute intracranial hemorrhage or hydroc

## 2017-06-13 NOTE — PROGRESS NOTES
Pt here for stroke follow up. Stroke on 8/8/17. Last seen 6 months ago. Pt feels she is doing well, although does report difference in temperature sensation from side to side.

## 2017-06-13 NOTE — PATIENT INSTRUCTIONS
Refill policies:    • Allow 2-3 business days for refills; controlled substances may take longer.   • Contact your pharmacy at least 5 days prior to running out of medication and have them send an electronic request or submit request through the [facility] have a procedure or additional testing performed. [facility]) will contact your insurance carrier to obtain pre-certification or prior authorization.     Unfortunately, [facility] has seen an increase in denial of payment even though the p

## 2017-06-16 ENCOUNTER — PATIENT OUTREACH (OUTPATIENT)
Dept: CASE MANAGEMENT | Age: 67
End: 2017-06-16

## 2017-06-19 ENCOUNTER — PATIENT OUTREACH (OUTPATIENT)
Dept: CASE MANAGEMENT | Age: 67
End: 2017-06-19

## 2017-06-19 NOTE — PROGRESS NOTES
Left message to introduce myself as new Chronic Care Manager and I will mail my info as well. Advised to call back and I will try again tomorrow.

## 2017-06-21 ENCOUNTER — TELEPHONE (OUTPATIENT)
Dept: NEUROLOGY | Facility: CLINIC | Age: 67
End: 2017-06-21

## 2017-06-22 RX ORDER — CLOPIDOGREL BISULFATE 75 MG/1
75 TABLET ORAL DAILY
Qty: 90 TABLET | Refills: 3 | Status: SHIPPED | OUTPATIENT
Start: 2017-06-22 | End: 2017-12-13

## 2017-06-22 NOTE — TELEPHONE ENCOUNTER
Clopidogrel Bisulfate (Tab) PLAVIX 75 MG     Patient has transferred all scripts to [facility] and they said the have sent over a number of faxes to us to have the scripts filled and has had no response.   They have called the patient and she is now calling us

## 2017-06-23 ENCOUNTER — PATIENT OUTREACH (OUTPATIENT)
Dept: CASE MANAGEMENT | Age: 67
End: 2017-06-23

## 2017-06-26 ENCOUNTER — OFFICE VISIT (OUTPATIENT)
Dept: SLEEP CENTER | Facility: HOSPITAL | Age: 67
End: 2017-06-26
Attending: FAMILY MEDICINE
Payer: MEDICARE

## 2017-06-26 ENCOUNTER — PATIENT OUTREACH (OUTPATIENT)
Dept: CASE MANAGEMENT | Age: 67
End: 2017-06-26

## 2017-06-26 DIAGNOSIS — M81.6 LOCALIZED OSTEOPOROSIS WITHOUT CURRENT PATHOLOGICAL FRACTURE: ICD-10-CM

## 2017-06-26 DIAGNOSIS — E78.5 HYPERLIPIDEMIA LDL GOAL <100: Chronic | ICD-10-CM

## 2017-06-26 DIAGNOSIS — E11.40 TYPE 2 DIABETES, CONTROLLED, WITH NEUROPATHY (HCC): Chronic | ICD-10-CM

## 2017-06-26 DIAGNOSIS — IMO0001 INSULIN DEPENDENT DIABETES MELLITUS WITH COMPLICATIONS: ICD-10-CM

## 2017-06-26 DIAGNOSIS — F32.1 MODERATE SINGLE CURRENT EPISODE OF MAJOR DEPRESSIVE DISORDER (HCC): ICD-10-CM

## 2017-06-26 DIAGNOSIS — I10 HYPERTENSION GOAL BP (BLOOD PRESSURE) < 130/80: Chronic | ICD-10-CM

## 2017-06-26 PROCEDURE — 95811 POLYSOM 6/>YRS CPAP 4/> PARM: CPT

## 2017-06-26 PROCEDURE — 99490 CHRNC CARE MGMT STAFF 1ST 20: CPT

## 2017-06-26 NOTE — TELEPHONE ENCOUNTER
Triage: Good morning, CCM call placed. Pt stated she would like refill on her Accu-Chek Anabelle test strips. Sent to [employer] order. LOV 4/7/2017, NOV 8/9/2017. Thank you in advance!

## 2017-06-26 NOTE — PROGRESS NOTES
6/26/2017  Spoke to Kelly Echeverria at length about CCM, current care plan and performed CCM assessment with Kelly Echeverria reviewed meds and compliance.  Reviewed Patient Active Problem List:     Malignant neoplasm of female breast ([facility] [address]     CAD, multiple vessel around this time due to her wound not healing up and since has been better (wound has healed). She also has been watching her diet and is trying to exercise more.    Muscle Spasms: pt has them on/off and states she doesn't always' take her Tylenol #3 she ta [facility]  [address] [facility] [facility]

## 2017-06-28 NOTE — TELEPHONE ENCOUNTER
Order placed for Accu-Chek Anabelle Test strips, testing 4 times per day #360 with 3 additional refills to [address] [place] [address] 160 order pharmacy

## 2017-06-29 NOTE — PROCEDURES
[address] [address]       Accredited by the [facility] ([facility])    PATIENT'S NAME:        Isaac Riddle PHYSICIAN:   Rebecca Mercedes M.D.   REFERRING PHYSICIAN:   Emmett Green M.D. water, adequate REM sleep was detected. The apnea-hypopnea index was 1.7. Oxygen jaime was 91%. PERIODIC LIMB MOVEMENTS:  PLM arousal index was 2.6. EEG:  With the limited recording montage, no EEG abnormalities were detected. EKG:   The EKG demo

## 2017-07-06 ENCOUNTER — TELEPHONE (OUTPATIENT)
Dept: FAMILY MEDICINE CLINIC | Facility: CLINIC | Age: 67
End: 2017-07-06

## 2017-07-06 DIAGNOSIS — IMO0001 INSULIN DEPENDENT DIABETES MELLITUS WITH COMPLICATIONS: ICD-10-CM

## 2017-07-06 DIAGNOSIS — I10 ESSENTIAL HYPERTENSION WITH GOAL BLOOD PRESSURE LESS THAN 130/80: ICD-10-CM

## 2017-07-06 DIAGNOSIS — E11.69 UNCONTROLLED TYPE 2 DIABETES MELLITUS WITH OTHER SPECIFIED COMPLICATION: ICD-10-CM

## 2017-07-06 DIAGNOSIS — Z79.4 TYPE 2 DIABETES MELLITUS WITHOUT COMPLICATION, WITH LONG-TERM CURRENT USE OF INSULIN (HCC): ICD-10-CM

## 2017-07-06 DIAGNOSIS — E11.40 TYPE 2 DIABETES, CONTROLLED, WITH NEUROPATHY (HCC): Chronic | ICD-10-CM

## 2017-07-06 DIAGNOSIS — I25.10 CAD IN NATIVE ARTERY: ICD-10-CM

## 2017-07-06 DIAGNOSIS — E78.5 HYPERLIPIDEMIA LDL GOAL <100: Chronic | ICD-10-CM

## 2017-07-06 DIAGNOSIS — E11.9 TYPE 2 DIABETES MELLITUS WITHOUT COMPLICATION, WITH LONG-TERM CURRENT USE OF INSULIN (HCC): ICD-10-CM

## 2017-07-06 DIAGNOSIS — E11.65 UNCONTROLLED TYPE 2 DIABETES MELLITUS WITH OTHER SPECIFIED COMPLICATION: ICD-10-CM

## 2017-07-06 RX ORDER — ATORVASTATIN CALCIUM 40 MG/1
40 TABLET, FILM COATED ORAL NIGHTLY
Qty: 90 TABLET | Refills: 1 | Status: SHIPPED | OUTPATIENT
Start: 2017-07-06 | End: 2018-02-05

## 2017-07-06 RX ORDER — PIOGLITAZONEHYDROCHLORIDE 45 MG/1
45 TABLET ORAL NIGHTLY
Qty: 90 TABLET | Refills: 1 | Status: SHIPPED | OUTPATIENT
Start: 2017-07-06 | End: 2017-12-13

## 2017-07-06 RX ORDER — LANCETS
1 EACH MISCELLANEOUS 4 TIMES DAILY
Qty: 2 BOX | Refills: 11 | Status: SHIPPED | OUTPATIENT
Start: 2017-07-06 | End: 2018-07-06

## 2017-07-06 RX ORDER — ATENOLOL 50 MG/1
50 TABLET ORAL
Qty: 90 TABLET | Refills: 1 | Status: SHIPPED | OUTPATIENT
Start: 2017-07-06 | End: 2017-11-09 | Stop reason: ALTCHOICE

## 2017-07-06 RX ORDER — ISOPROPYL ALCOHOL 0.75 G/1
SWAB TOPICAL
Qty: 600 EACH | Refills: 3 | Status: SHIPPED | OUTPATIENT
Start: 2017-07-06 | End: 2018-09-19

## 2017-07-06 RX ORDER — BLOOD-GLUCOSE METER
1 EACH MISCELLANEOUS 4 TIMES DAILY
Qty: 1 KIT | Refills: 0 | Status: SHIPPED | OUTPATIENT
Start: 2017-07-06 | End: 2018-09-19

## 2017-07-06 NOTE — TELEPHONE ENCOUNTER
LOV 4/7/2017 with Dr Macho [facility] labs 512/2017 refilled per protocol is due for lab work

## 2017-07-06 NOTE — TELEPHONE ENCOUNTER
Rx request form [address]. Pleas send a 90 day for the follow medications; Accu-Check Anabelle Plus Meter. Accue-Check Softclix Lancets. BD single use swab. Accu-Check Anabelle Solution. Pioglitazone HCL 45 mg. Benazepril HCL 20 mg.     Me

## 2017-07-17 PROBLEM — R60.0 LOCALIZED EDEMA: Status: ACTIVE | Noted: 2017-07-17

## 2017-07-24 ENCOUNTER — PATIENT OUTREACH (OUTPATIENT)
Dept: CASE MANAGEMENT | Age: 67
End: 2017-07-24

## 2017-08-04 DIAGNOSIS — I10 HYPERTENSION GOAL BP (BLOOD PRESSURE) < 130/80: Chronic | ICD-10-CM

## 2017-08-04 DIAGNOSIS — E11.65 UNCONTROLLED TYPE 2 DIABETES MELLITUS WITH INSULIN THERAPY (HCC): Primary | Chronic | ICD-10-CM

## 2017-08-04 DIAGNOSIS — Z79.4 UNCONTROLLED TYPE 2 DIABETES MELLITUS WITH INSULIN THERAPY (HCC): Primary | Chronic | ICD-10-CM

## 2017-08-04 DIAGNOSIS — E78.5 HYPERLIPIDEMIA LDL GOAL <100: Chronic | ICD-10-CM

## 2017-08-04 RX ORDER — BENAZEPRIL HYDROCHLORIDE 20 MG/1
20 TABLET ORAL DAILY
Qty: 90 TABLET | Refills: 3 | Status: SHIPPED | OUTPATIENT
Start: 2017-08-04 | End: 2018-09-19

## 2017-08-04 RX ORDER — GLIMEPIRIDE 4 MG/1
TABLET ORAL
Qty: 90 TABLET | Refills: 3 | Status: SHIPPED | OUTPATIENT
Start: 2017-08-04 | End: 2018-05-10

## 2017-08-04 NOTE — TELEPHONE ENCOUNTER
LOV 4/7/17, last CMP 11/11/16  Pt has appt 8/9/17- Pt states will get labs done at appointment  Benazepril HCl 20mg  GLIMEPIRIDE 4 MG   Will you authorize a 90 day request?

## 2017-08-08 ENCOUNTER — LAB ENCOUNTER (OUTPATIENT)
Dept: LAB | Age: 67
End: 2017-08-08
Attending: FAMILY MEDICINE
Payer: MEDICARE

## 2017-08-08 DIAGNOSIS — E78.5 HYPERLIPIDEMIA LDL GOAL <100: Chronic | ICD-10-CM

## 2017-08-08 DIAGNOSIS — I10 ESSENTIAL HYPERTENSION WITH GOAL BLOOD PRESSURE LESS THAN 130/80: Chronic | ICD-10-CM

## 2017-08-08 DIAGNOSIS — E11.40 TYPE 2 DIABETES, CONTROLLED, WITH NEUROPATHY (HCC): ICD-10-CM

## 2017-08-08 LAB
ALBUMIN SERPL-MCNC: 3.6 G/DL (ref 3.5–4.8)
ALP LIVER SERPL-CCNC: 69 U/L (ref 55–142)
ALT SERPL-CCNC: 24 U/L (ref 14–54)
AST SERPL-CCNC: 16 U/L (ref 15–41)
BASOPHILS # BLD AUTO: 0.05 X10(3) UL (ref 0–0.1)
BASOPHILS NFR BLD AUTO: 0.9 %
BILIRUB SERPL-MCNC: 0.6 MG/DL (ref 0.1–2)
BUN BLD-MCNC: 13 MG/DL (ref 8–20)
CALCIUM BLD-MCNC: 9 MG/DL (ref 8.3–10.3)
CHLORIDE: 105 MMOL/L (ref 101–111)
CHOLEST SMN-MCNC: 196 MG/DL (ref ?–200)
CO2: 28 MMOL/L (ref 22–32)
CREAT BLD-MCNC: 0.7 MG/DL (ref 0.55–1.02)
EOSINOPHIL # BLD AUTO: 0.12 X10(3) UL (ref 0–0.3)
EOSINOPHIL NFR BLD AUTO: 2.2 %
ERYTHROCYTE [DISTWIDTH] IN BLOOD BY AUTOMATED COUNT: 12.5 % (ref 11.5–16)
GLUCOSE BLD-MCNC: 152 MG/DL (ref 70–99)
HCT VFR BLD AUTO: 42.5 % (ref 34–50)
HDLC SERPL-MCNC: 53 MG/DL (ref 45–?)
HDLC SERPL: 3.7 {RATIO} (ref ?–4.44)
HGB BLD-MCNC: 13.1 G/DL (ref 12–16)
IMMATURE GRANULOCYTE COUNT: 0.01 X10(3) UL (ref 0–1)
IMMATURE GRANULOCYTE RATIO %: 0.2 %
LDLC SERPL CALC-MCNC: 117 MG/DL (ref ?–130)
LDLC SERPL-MCNC: 26 MG/DL (ref 5–40)
LYMPHOCYTES # BLD AUTO: 1.37 X10(3) UL (ref 0.9–4)
LYMPHOCYTES NFR BLD AUTO: 24.8 %
M PROTEIN MFR SERPL ELPH: 7.5 G/DL (ref 6.1–8.3)
MCH RBC QN AUTO: 29.2 PG (ref 27–33.2)
MCHC RBC AUTO-ENTMCNC: 30.8 G/DL (ref 31–37)
MCV RBC AUTO: 94.7 FL (ref 81–100)
MONOCYTES # BLD AUTO: 0.32 X10(3) UL (ref 0.1–0.6)
MONOCYTES NFR BLD AUTO: 5.8 %
NEUTROPHIL ABS PRELIM: 3.66 X10 (3) UL (ref 1.3–6.7)
NEUTROPHILS # BLD AUTO: 3.66 X10(3) UL (ref 1.3–6.7)
NEUTROPHILS NFR BLD AUTO: 66.1 %
NONHDLC SERPL-MCNC: 143 MG/DL (ref ?–130)
PLATELET # BLD AUTO: 233 10(3)UL (ref 150–450)
POTASSIUM SERPL-SCNC: 4.3 MMOL/L (ref 3.6–5.1)
RBC # BLD AUTO: 4.49 X10(6)UL (ref 3.8–5.1)
RED CELL DISTRIBUTION WIDTH-SD: 43.3 FL (ref 35.1–46.3)
SODIUM SERPL-SCNC: 141 MMOL/L (ref 136–144)
TRIGLYCERIDES: 131 MG/DL (ref ?–150)
WBC # BLD AUTO: 5.5 X10(3) UL (ref 4–13)

## 2017-08-08 PROCEDURE — 36415 COLL VENOUS BLD VENIPUNCTURE: CPT | Performed by: FAMILY MEDICINE

## 2017-08-09 ENCOUNTER — TELEPHONE (OUTPATIENT)
Dept: FAMILY MEDICINE CLINIC | Facility: CLINIC | Age: 67
End: 2017-08-09

## 2017-08-09 DIAGNOSIS — E11.40 TYPE 2 DIABETES, CONTROLLED, WITH NEUROPATHY (HCC): Primary | Chronic | ICD-10-CM

## 2017-08-09 LAB
EST. AVERAGE GLUCOSE BLD GHB EST-MCNC: 177 MG/DL (ref 68–126)
HBA1C MFR BLD HPLC: 7.8 % (ref ?–5.7)

## 2017-08-09 RX ORDER — BLOOD GLUCOSE CONTROL HIGH,LOW
EACH MISCELLANEOUS
Qty: 1 EACH | Refills: 3 | Status: SHIPPED | OUTPATIENT
Start: 2017-08-09 | End: 2018-09-19

## 2017-08-22 ENCOUNTER — OFFICE VISIT (OUTPATIENT)
Dept: NEUROLOGY | Facility: CLINIC | Age: 67
End: 2017-08-22

## 2017-08-22 VITALS
BODY MASS INDEX: 30.56 KG/M2 | RESPIRATION RATE: 18 BRPM | SYSTOLIC BLOOD PRESSURE: 140 MMHG | HEART RATE: 102 BPM | WEIGHT: 179 LBS | DIASTOLIC BLOOD PRESSURE: 78 MMHG | HEIGHT: 64 IN

## 2017-08-22 DIAGNOSIS — F32.89 OTHER DEPRESSION: ICD-10-CM

## 2017-08-22 DIAGNOSIS — I63.9 BRAINSTEM INFARCTION (HCC): Primary | ICD-10-CM

## 2017-08-22 PROBLEM — F32.A DEPRESSION: Status: ACTIVE | Noted: 2017-08-22

## 2017-08-22 PROCEDURE — 99213 OFFICE O/P EST LOW 20 MIN: CPT | Performed by: OTHER

## 2017-08-22 NOTE — PATIENT INSTRUCTIONS
Refill policies:    • Allow 2-3 business days for refills; controlled substances may take longer.   • Contact your pharmacy at least 5 days prior to running out of medication and have them send an electronic request or submit request through the [facility] have a procedure or additional testing performed. [facility]) will contact your insurance carrier to obtain pre-certification or prior authorization.     Unfortunately, [facility] has seen an increase in denial of payment even though the p

## 2017-08-22 NOTE — PROGRESS NOTES
Neurology Outpatient    Eugene Law Patient Status:  No patient class for patient encounter    1950 MRN [id number]   Location ED [facility] Attending No att. providers found   [address] Day #  PCP Ramila Birch MD     Subjective:  Carlos Milton ANN MARIE) In Vitro Solution Use to check calibration of monitor Disp: 1 each Rfl: 3   glimepiride 4 MG Oral Tab TAKE ONE TABLET BY MOUTH IN THE MORNING BEFORE BREAKFAST Disp: 90 tablet Rfl: 3   Benazepril HCl 20 MG Oral Tab Take 1 tablet (20 mg total) by mout Inject 20 Units into the skin daily. (Patient taking differently: Inject 16 Units into the skin daily as needed. Only if BS > 200 ) Disp: 10 pen Rfl: 3   Multiple Vitamin (ONE-DAILY MULTI VITAMINS) Oral Tab Take 1 tablet by mouth daily.  Disp:  Rfl: • Visual impairment        PSHx:  Past Surgical History:  : ANGIOPLASTY (CORONARY)      Comment: 1 stent per Dr Ferdinand Pinon  No date: BREAST SURGERY      Comment: left lumpectomy  08: [facility] E:   No date denies depression   Skin: denies any new masses, rashes, lumps or bruising    Objective/Physical Exam:    Vital Signs:  Weight 179 lb, not currently breastfeeding.     HEENT: moist mucus membranes  Neck: Supple, non tender  Cardiovascular: Regular rate and signal within the white matter of the subcortical, deep and periventricular cerebrum, and patchy similar signal within the   yoan. Old lacunar infarct anterior yoan. No mass effect, midline shift, hydrocephalus, herniation, or extra-axial fluid collection. acute infarct within lateral aspect of the right cervicomedullary junction. 2. No acute intracranial hemorrhage or hydrocephalus. 3. Moderate chronic small vessel ischemic disease within the cerebral white matter and yoan.   4. There is a 2.4 cm density c

## 2017-08-24 ENCOUNTER — OFFICE VISIT (OUTPATIENT)
Dept: FAMILY MEDICINE CLINIC | Facility: CLINIC | Age: 67
End: 2017-08-24

## 2017-08-24 VITALS
HEART RATE: 72 BPM | WEIGHT: 177 LBS | SYSTOLIC BLOOD PRESSURE: 126 MMHG | DIASTOLIC BLOOD PRESSURE: 70 MMHG | BODY MASS INDEX: 30.22 KG/M2 | HEIGHT: 64 IN | TEMPERATURE: 99 F

## 2017-08-24 DIAGNOSIS — S80.821A BLISTER OF RIGHT LOWER EXTREMITY, INITIAL ENCOUNTER: Primary | ICD-10-CM

## 2017-08-24 DIAGNOSIS — H61.23 BILATERAL HEARING LOSS DUE TO CERUMEN IMPACTION: ICD-10-CM

## 2017-08-24 PROCEDURE — 87205 SMEAR GRAM STAIN: CPT | Performed by: PHYSICIAN ASSISTANT

## 2017-08-24 PROCEDURE — 99214 OFFICE O/P EST MOD 30 MIN: CPT | Performed by: PHYSICIAN ASSISTANT

## 2017-08-24 PROCEDURE — 87070 CULTURE OTHR SPECIMN AEROBIC: CPT | Performed by: PHYSICIAN ASSISTANT

## 2017-08-24 NOTE — PROGRESS NOTES
CC:  Patient presents with:  Bump: lump on right shin needs inspection, x 4 days  Ear Problem: would like ears inspected, feels like there is an increased amount of popping      HPI: Rubi Ibanez presents for a painless blister on her right anterior lower leg Tab TAKE ONE TABLET BY MOUTH TWICE A DAY WITH MEALS Disp: 180 tablet Rfl: 1   Glucose Blood (ACCU-CHEK ANN MARIE) In Vitro Strip Test blood sugar four (4) times per day Disp: 360 each Rfl: 3   Blood Glucose Monitoring Suppl (ACCU-CHEK ANN MARIE PLUS) w/Device Does changes  HENT: See HPI  Eyes: Normal vision; no eye pain or FBs  Respiratory: No cough, SOB, or hemoptysis  Cardiovascular: No CP or palpitations; no peripheral edema  Gastrointestinal: Normal bowels; no abdominal pain or N/V/D/C  Genitourinary:  Normal ur Anna: 25 minutes, greater than 50% spent counseling and educating. Patient/Caregiver Education: Patient/Caregiver Education: There are no barriers to learning. Medical education done. Outcome: Patient verbalizes understanding.  Patient is notified

## 2017-08-28 ENCOUNTER — PATIENT OUTREACH (OUTPATIENT)
Dept: CASE MANAGEMENT | Age: 67
End: 2017-08-28

## 2017-08-28 DIAGNOSIS — F32.89 OTHER DEPRESSION: ICD-10-CM

## 2017-08-28 DIAGNOSIS — I10 HYPERTENSION GOAL BP (BLOOD PRESSURE) < 130/80: Chronic | ICD-10-CM

## 2017-08-28 DIAGNOSIS — M81.6 LOCALIZED OSTEOPOROSIS WITHOUT CURRENT PATHOLOGICAL FRACTURE: ICD-10-CM

## 2017-08-28 DIAGNOSIS — E78.5 HYPERLIPIDEMIA LDL GOAL <100: Chronic | ICD-10-CM

## 2017-08-28 DIAGNOSIS — E11.40 TYPE 2 DIABETES, CONTROLLED, WITH NEUROPATHY (HCC): Chronic | ICD-10-CM

## 2017-08-28 DIAGNOSIS — C50.312 MALIGNANT NEOPLASM OF LOWER-INNER QUADRANT OF LEFT FEMALE BREAST, UNSPECIFIED ESTROGEN RECEPTOR STATUS (HCC): Chronic | ICD-10-CM

## 2017-08-28 PROCEDURE — 99490 CHRNC CARE MGMT STAFF 1ST 20: CPT

## 2017-08-28 NOTE — PROGRESS NOTES
8/28/2017  Spoke to Waldemar esthela at length about CCM, current care plan and reviewed meds and compliance.  Reviewed Patient Active Problem List:     Malignant neoplasm of female breast ([facility] [address]     CAD, multiple vessel     Osteoporosis     Hypertension goal BP (b over and dressing will stick to her skin therefore when removing peel some good skin off. She see's Dr. Justus May on Aug 30 th. She also had some hearing issues due to impacted wax but reports having ears cleaned out at visit and her ear are better.   Pain: pt [address] [facility] at [address] Clemente [facility] [phone]          Goal: cut down on bread intake, incorporate more exercise resistant weights and join increase amount of shaggy

## 2017-08-30 ENCOUNTER — OFFICE VISIT (OUTPATIENT)
Dept: FAMILY MEDICINE CLINIC | Facility: CLINIC | Age: 67
End: 2017-08-30

## 2017-08-30 ENCOUNTER — TELEPHONE (OUTPATIENT)
Dept: NEUROLOGY | Facility: CLINIC | Age: 67
End: 2017-08-30

## 2017-08-30 VITALS
SYSTOLIC BLOOD PRESSURE: 128 MMHG | HEIGHT: 64 IN | BODY MASS INDEX: 29.53 KG/M2 | DIASTOLIC BLOOD PRESSURE: 66 MMHG | HEART RATE: 80 BPM | TEMPERATURE: 97 F | WEIGHT: 173 LBS | RESPIRATION RATE: 16 BRPM

## 2017-08-30 DIAGNOSIS — I10 HYPERTENSION GOAL BP (BLOOD PRESSURE) < 130/80: Chronic | ICD-10-CM

## 2017-08-30 DIAGNOSIS — E11.65 UNCONTROLLED TYPE 2 DIABETES MELLITUS WITH INSULIN THERAPY (HCC): Chronic | ICD-10-CM

## 2017-08-30 DIAGNOSIS — F32.1 MODERATE SINGLE CURRENT EPISODE OF MAJOR DEPRESSIVE DISORDER (HCC): ICD-10-CM

## 2017-08-30 DIAGNOSIS — I70.0 ATHEROSCLEROSIS OF AORTA (HCC): Chronic | ICD-10-CM

## 2017-08-30 DIAGNOSIS — E11.40 TYPE 2 DIABETES, CONTROLLED, WITH NEUROPATHY (HCC): Primary | Chronic | ICD-10-CM

## 2017-08-30 DIAGNOSIS — Z79.4 UNCONTROLLED TYPE 2 DIABETES MELLITUS WITH INSULIN THERAPY (HCC): Chronic | ICD-10-CM

## 2017-08-30 DIAGNOSIS — E78.5 HYPERLIPIDEMIA LDL GOAL <100: Chronic | ICD-10-CM

## 2017-08-30 DIAGNOSIS — I73.9 PAD (PERIPHERAL ARTERY DISEASE) (HCC): ICD-10-CM

## 2017-08-30 PROCEDURE — 99214 OFFICE O/P EST MOD 30 MIN: CPT | Performed by: FAMILY MEDICINE

## 2017-08-30 NOTE — TELEPHONE ENCOUNTER
Spoke with patient and informed her of message regarding referral.     Patient requested information be mailed to her because she does not have access to a pen and paper. Confirmed mailing address. Mailed to patient.

## 2017-08-30 NOTE — TELEPHONE ENCOUNTER
----- Message -----   From: Kim Nelson   Sent: 8/28/2017   6:13 AM   To: Adan Alcala Pain Nurse      Hello,      For psych, patient can self refer by calling Hemanth Triplett at [facility] 35 [phone].  This referral will be will cancelle

## 2017-08-30 NOTE — PROGRESS NOTES
HPI:   Sybil Monk is a 78year old female who presents for recheck of her diabetes. A1c 7.8  Swelling last week, better with drainage, Dr Murtaza Kaur added furosemide.   Patient presents with:  Hypertension  Diabetes    Diabetes Care Dilated Eye Exam d 08/08/2017 10:19 AM   CHOLEST 196 08/08/2017 10:19 AM   TRIG 131 08/08/2017 10:19 AM   VLDL 26 08/08/2017 10:19 AM   T4F 1.0 02/23/2016 11:13 AM   TSH 0.522 08/10/2016 10:48 AM   BUN 13 08/08/2017 10:19 AM   CREATSERUM 0.70 08/08/2017 10:19 AM   GFR 91 08/ TABLET BY MOUTH IN THE MORNING BEFORE BREAKFAST   Benazepril HCl 20 MG Oral Tab Take 1 tablet (20 mg total) by mouth daily. furosemide 20 MG Oral Tab Take 1 tablet (20 mg total) by mouth daily.    MetFORMIN HCl 1000 MG Oral Tab TAKE ONE TABLET BY MOUTH TW exertion  CARDIOVASCULAR: denies chest pain on exertion  GI: denies abdominal pain and denies heartburn  NEURO: denies headaches    EXAM:   /66   Pulse 80   Temp (!) 97.2 °F (36.2 °C)   Resp 16   Ht 64\"   Wt 173 lb   BMI 29.70 kg/m²  Estimated body effects noted. PLAN: will continue present medications, reviewed diet, exercise and weight control, and labs as ordered              Ms. Nadira Munguia does not currently take aspirin.        The patient indicates understanding of these issues and agrees to th see ophthalmology soon, check feet daily and will check labs as ordered.       Blood Sugar Medications          glimepiride 4 MG Oral Tab    MetFORMIN HCl 1000 MG Oral Tab    Pioglitazone HCl (ACTOS) 45 MG Oral Tab    Insulin Glargine (TOUJEO SOLOSTAR) 300

## 2017-08-31 NOTE — ASSESSMENT & PLAN NOTE
Stable, Continue present management.     Cholesterol Lowering Medications          atorvastatin 40 MG Oral Tab

## 2017-09-06 ENCOUNTER — TELEPHONE (OUTPATIENT)
Dept: FAMILY MEDICINE CLINIC | Facility: CLINIC | Age: 67
End: 2017-09-06

## 2017-09-06 NOTE — TELEPHONE ENCOUNTER
Called and talked to patient and she is not out of meds but I assured her that there was an alternative so she will call when she needs a refill

## 2017-09-06 NOTE — TELEPHONE ENCOUNTER
Patient states that she received a letter from [facility] [employer] stating atenolol is on backorder. Patient would like to know what medication can be prescribed.

## 2017-09-11 ENCOUNTER — PATIENT OUTREACH (OUTPATIENT)
Dept: CASE MANAGEMENT | Age: 67
End: 2017-09-11

## 2017-09-11 NOTE — PROGRESS NOTES
Attempted to contact pt to see how she is doing, no answer left detailed message for pt to call back. Will try again in a couple days.   Total time spent with patient including chart review: 2  Time spent with patient this month: 2    Total time spent with

## 2017-09-15 ENCOUNTER — PATIENT OUTREACH (OUTPATIENT)
Dept: CASE MANAGEMENT | Age: 67
End: 2017-09-15

## 2017-09-15 NOTE — PROGRESS NOTES
Spoke to pt and she was in the middle of her CPAP being explained to her at home so she asked if I could call her Monday afternoon. Will call Monday for follow up.    Total time spent with patient including chart review: 4  Time spent with patient this evangelina

## 2017-09-19 ENCOUNTER — LAB ENCOUNTER (OUTPATIENT)
Dept: LAB | Age: 67
End: 2017-09-19
Attending: INTERNAL MEDICINE
Payer: MEDICARE

## 2017-09-19 ENCOUNTER — TELEPHONE (OUTPATIENT)
Dept: FAMILY MEDICINE CLINIC | Facility: CLINIC | Age: 67
End: 2017-09-19

## 2017-09-19 ENCOUNTER — HOSPITAL ENCOUNTER (OUTPATIENT)
Dept: ULTRASOUND IMAGING | Facility: HOSPITAL | Age: 67
Discharge: HOME | End: 2017-09-19
Attending: INTERNAL MEDICINE
Payer: MEDICARE

## 2017-09-19 DIAGNOSIS — E11.319 DIABETIC RETINOPATHY OF BOTH EYES ASSOCIATED WITH TYPE 2 DIABETES MELLITUS, MACULAR EDEMA PRESENCE UNSPECIFIED, UNSPECIFIED RETINOPATHY SEVERITY (HCC): Chronic | ICD-10-CM

## 2017-09-19 DIAGNOSIS — E11.40 TYPE 2 DIABETES, CONTROLLED, WITH NEUROPATHY (HCC): Chronic | ICD-10-CM

## 2017-09-19 DIAGNOSIS — Z85.3 PERSONAL HISTORY OF MALIGNANT NEOPLASM OF BREAST: Primary | ICD-10-CM

## 2017-09-19 DIAGNOSIS — I25.10 CAD, MULTIPLE VESSEL: Chronic | ICD-10-CM

## 2017-09-19 DIAGNOSIS — I73.9 PAD (PERIPHERAL ARTERY DISEASE) (HCC): ICD-10-CM

## 2017-09-19 DIAGNOSIS — I69.351 HEMIPARESIS AFFECTING RIGHT SIDE AS LATE EFFECT OF STROKE (HCC): ICD-10-CM

## 2017-09-19 DIAGNOSIS — I10 HYPERTENSION GOAL BP (BLOOD PRESSURE) < 130/80: Chronic | ICD-10-CM

## 2017-09-19 DIAGNOSIS — E78.5 HYPERLIPIDEMIA LDL GOAL <100: Chronic | ICD-10-CM

## 2017-09-19 DIAGNOSIS — C50.312 MALIGNANT NEOPLASM OF LOWER-INNER QUADRANT OF LEFT FEMALE BREAST, UNSPECIFIED ESTROGEN RECEPTOR STATUS (HCC): Chronic | ICD-10-CM

## 2017-09-19 DIAGNOSIS — E55.9 VITAMIN D DEFICIENCY: ICD-10-CM

## 2017-09-19 LAB
FREE T4: 0.9 NG/DL (ref 0.9–1.8)
TSI SER-ACNC: 0.7 MIU/ML (ref 0.35–5.5)

## 2017-09-19 PROCEDURE — 84443 ASSAY THYROID STIM HORMONE: CPT

## 2017-09-19 PROCEDURE — 76536 US EXAM OF HEAD AND NECK: CPT | Performed by: INTERNAL MEDICINE

## 2017-09-19 PROCEDURE — 84439 ASSAY OF FREE THYROXINE: CPT

## 2017-09-19 PROCEDURE — 36415 COLL VENOUS BLD VENIPUNCTURE: CPT

## 2017-09-19 NOTE — TELEPHONE ENCOUNTER
Received a call from Kaycee Guo from Dr Estrella Park office regarding appt for today, requesting 4 OV with will accommodate the rest of 2017 for patient with Dx Z8.3  Referral entered and sent.    Reviewed scaned documents in MEDIA, no office visit  with Dr Leonie Begum

## 2017-09-19 NOTE — TELEPHONE ENCOUNTER
Patient is on her cell phone stated she has been at the hospital for 5 hours waiting to have her port flushed and need a referral for this, I was unclear of the codes for this and I asked her to have the staff pleas give me a call to give me the proper cod

## 2017-09-19 NOTE — TELEPHONE ENCOUNTER
Pt extremely upset because a referral to Talisha Chris MD was to have been placed last week.   At office now -

## 2017-09-20 ENCOUNTER — PATIENT OUTREACH (OUTPATIENT)
Dept: CASE MANAGEMENT | Age: 67
End: 2017-09-20

## 2017-09-20 DIAGNOSIS — I10 HYPERTENSION GOAL BP (BLOOD PRESSURE) < 130/80: Chronic | ICD-10-CM

## 2017-09-20 DIAGNOSIS — E78.5 HYPERLIPIDEMIA LDL GOAL <100: Chronic | ICD-10-CM

## 2017-09-20 DIAGNOSIS — M81.6 LOCALIZED OSTEOPOROSIS WITHOUT CURRENT PATHOLOGICAL FRACTURE: ICD-10-CM

## 2017-09-20 DIAGNOSIS — E11.65 UNCONTROLLED TYPE 2 DIABETES MELLITUS WITH INSULIN THERAPY (HCC): Chronic | ICD-10-CM

## 2017-09-20 DIAGNOSIS — Z79.4 UNCONTROLLED TYPE 2 DIABETES MELLITUS WITH INSULIN THERAPY (HCC): Chronic | ICD-10-CM

## 2017-09-20 DIAGNOSIS — F32.89 OTHER DEPRESSION: ICD-10-CM

## 2017-09-20 PROCEDURE — 99490 CHRNC CARE MGMT STAFF 1ST 20: CPT

## 2017-09-20 NOTE — PROGRESS NOTES
9/20/2017  Spoke to Gregorio Quinteros at length about CCM, current care plan and reviewed meds and compliance.  Reviewed Patient Active Problem List:     Malignant neoplasm of female breast ([facility] [address]     CAD, multiple vessel     Osteoporosis     Hypertension goal BP (b working full time jobs now and it is hard for her to get around discussed transportation assistance and pt states she did not receive information that was sent. Notified pt that I would resend transportation assistance information.  Pt states she is still w [phone]          Goal: continue to cut down on bread intake, incorporate more exercise resistant weights and join increase amount of times she is going to the fitness club.    Care Plan: Reviewed and updated where needed  Sending informatio

## 2017-09-21 NOTE — PROGRESS NOTES
Patient informed of Dr. Lakeisha Welch result note. Patient verbalized understanding and agrees with plan.

## 2017-09-21 NOTE — PROGRESS NOTES
Patient calling again because she has not heard back from us yet. Informed her that Dr. Flo aFy is seeing patients, and will respond when she is able. She verbalized understanding.

## 2017-09-21 NOTE — PROGRESS NOTES
Patient calling again because she has not heard back from us yet. Informed her that Dr. Joan Keith is seeing patients, and will respond when she is able. She verbalized understanding.

## 2017-09-21 NOTE — PROGRESS NOTES
Best number to call 8am-4pm :Home Phone      [phone]  Patient informed of Dr. Rukhsana Donnelly result note. Patient verbalized understanding and agrees with plan. Pt states this is her first thyroid u/s.   She is wondering how Dr Kenya Sparks knows that nodules have

## 2017-09-22 NOTE — PROGRESS NOTES
Patient informed of Dr. Lori Cordero result note. Patient verbalized understanding and agrees with plan.

## 2017-10-13 ENCOUNTER — TELEPHONE (OUTPATIENT)
Dept: FAMILY MEDICINE CLINIC | Facility: CLINIC | Age: 67
End: 2017-10-13

## 2017-10-13 RX ORDER — CARVEDILOL 6.25 MG/1
6.25 TABLET ORAL 2 TIMES DAILY WITH MEALS
Qty: 180 TABLET | Refills: 0 | Status: SHIPPED | OUTPATIENT
Start: 2017-10-13 | End: 2018-05-07

## 2017-10-13 NOTE — TELEPHONE ENCOUNTER
Pt needs a new script due to back order of atenolol. Pt also states she is sleeping a lot and was told this is park of healing process from stroke last year. Pt believes the fatigue is related to her medication.   Please advise

## 2017-10-13 NOTE — TELEPHONE ENCOUNTER
Pt requesting alternate rx to atenolol 50 MG Oral Tab since it is on back order.   Also , pt having trouble staying awake and wants to discuss side effects

## 2017-10-13 NOTE — TELEPHONE ENCOUNTER
Future Appointments  Date Time Provider Shiela Roy   10/27/2017 9:45 AM MD JOE Nelson [facility]   11/16/2017 3:00 PM Adeel Littlejohn DPM [facility] NP POD Mariya Rodriguez   12/18/2017 11:15 AM MD JOE Nelson [facility]   1/12/2018 11:00

## 2017-10-24 ENCOUNTER — PATIENT OUTREACH (OUTPATIENT)
Dept: CASE MANAGEMENT | Age: 67
End: 2017-10-24

## 2017-11-09 ENCOUNTER — PATIENT OUTREACH (OUTPATIENT)
Dept: CASE MANAGEMENT | Age: 67
End: 2017-11-09

## 2017-11-09 DIAGNOSIS — F32.89 OTHER DEPRESSION: ICD-10-CM

## 2017-11-09 DIAGNOSIS — C50.312 MALIGNANT NEOPLASM OF LOWER-INNER QUADRANT OF LEFT FEMALE BREAST, UNSPECIFIED ESTROGEN RECEPTOR STATUS (HCC): Chronic | ICD-10-CM

## 2017-11-09 DIAGNOSIS — E78.5 HYPERLIPIDEMIA LDL GOAL <100: Chronic | ICD-10-CM

## 2017-11-09 DIAGNOSIS — I10 HYPERTENSION GOAL BP (BLOOD PRESSURE) < 130/80: Chronic | ICD-10-CM

## 2017-11-09 DIAGNOSIS — Z79.4 UNCONTROLLED TYPE 2 DIABETES MELLITUS WITH INSULIN THERAPY (HCC): Chronic | ICD-10-CM

## 2017-11-09 DIAGNOSIS — M81.6 LOCALIZED OSTEOPOROSIS WITHOUT CURRENT PATHOLOGICAL FRACTURE: ICD-10-CM

## 2017-11-09 DIAGNOSIS — F32.1 MODERATE SINGLE CURRENT EPISODE OF MAJOR DEPRESSIVE DISORDER (HCC): ICD-10-CM

## 2017-11-09 DIAGNOSIS — E11.65 UNCONTROLLED TYPE 2 DIABETES MELLITUS WITH INSULIN THERAPY (HCC): Chronic | ICD-10-CM

## 2017-11-09 PROCEDURE — 99490 CHRNC CARE MGMT STAFF 1ST 20: CPT

## 2017-11-09 NOTE — PROGRESS NOTES
11/9/2017  Spoke to Stephanie Holland at length about CCM, current care plan and reviewed meds and compliance.  Reviewed Patient Active Problem List:     Malignant neoplasm of female breast ([facility] [address]     CAD, multiple vessel     Osteoporosis     Hypertension goal BP (b this time, no barriers reported. Dragan Ramirez reports is stable on all medications and denies experiencing any side effects.     Your appointments     Date & Time Appointment Department [facility])    Nov 16, 2017  3:00 PM CST EXAM-ESTABLISHED with Jeri Urbano, complete health history, and need for CCM established by Pippa Payan MD.

## 2017-11-10 ENCOUNTER — TELEPHONE (OUTPATIENT)
Dept: FAMILY MEDICINE CLINIC | Facility: CLINIC | Age: 67
End: 2017-11-10

## 2017-11-10 ENCOUNTER — PRIOR ORIGINAL RECORDS (OUTPATIENT)
Dept: OTHER | Age: 67
End: 2017-11-10

## 2017-11-10 DIAGNOSIS — C50.312 MALIGNANT NEOPLASM OF LOWER-INNER QUADRANT OF LEFT FEMALE BREAST, UNSPECIFIED ESTROGEN RECEPTOR STATUS (HCC): Primary | Chronic | ICD-10-CM

## 2017-11-10 NOTE — TELEPHONE ENCOUNTER
Dr Darius San at [facility] onc is great    Diagnoses and all orders for this visit:    Malignant neoplasm of lower-inner quadrant of left female breast, unspecified estrogen receptor status ([facility] [address]  -     OP REFERRAL TO [place]

## 2017-11-16 PROBLEM — R26.2 DIFFICULTY WALKING: Status: ACTIVE | Noted: 2017-11-16

## 2017-11-16 NOTE — TELEPHONE ENCOUNTER
Update from [facility], Dr Jason Sandoval is STILL [place] but he is now part of [facility] ([facility]). OK to STILL see Dr Jason Sandoval. Thanks.

## 2017-11-16 NOTE — TELEPHONE ENCOUNTER
Called and talked to patient and told her that her MD is still in network so she will continue with him

## 2017-12-13 ENCOUNTER — PATIENT OUTREACH (OUTPATIENT)
Dept: CASE MANAGEMENT | Age: 67
End: 2017-12-13

## 2017-12-13 DIAGNOSIS — C50.312 MALIGNANT NEOPLASM OF LOWER-INNER QUADRANT OF LEFT FEMALE BREAST, UNSPECIFIED ESTROGEN RECEPTOR STATUS (HCC): Chronic | ICD-10-CM

## 2017-12-13 DIAGNOSIS — Z79.4 UNCONTROLLED TYPE 2 DIABETES MELLITUS WITH INSULIN THERAPY (HCC): Chronic | ICD-10-CM

## 2017-12-13 DIAGNOSIS — E78.5 HYPERLIPIDEMIA LDL GOAL <100: Chronic | ICD-10-CM

## 2017-12-13 DIAGNOSIS — M81.6 LOCALIZED OSTEOPOROSIS WITHOUT CURRENT PATHOLOGICAL FRACTURE: ICD-10-CM

## 2017-12-13 DIAGNOSIS — E11.65 UNCONTROLLED TYPE 2 DIABETES MELLITUS WITH INSULIN THERAPY (HCC): Chronic | ICD-10-CM

## 2017-12-13 DIAGNOSIS — F32.A DEPRESSION, UNSPECIFIED DEPRESSION TYPE: ICD-10-CM

## 2017-12-13 DIAGNOSIS — I10 HYPERTENSION GOAL BP (BLOOD PRESSURE) < 130/80: Chronic | ICD-10-CM

## 2017-12-13 PROCEDURE — 99490 CHRNC CARE MGMT STAFF 1ST 20: CPT

## 2017-12-13 NOTE — PROGRESS NOTES
12/13/2017  Spoke to Lewellen at length about CCM, current care plan and reviewed meds and compliance.  Reviewed Patient Active Problem List:     Malignant neoplasm of female breast ([facility] [address]     CAD, multiple vessel     Osteoporosis     Hypertension goal BP ( around the house. Meds: Detailed medication review with Gladys Villalobos. Discussed with Gladys Villalobos if any potential barriers are present that could prevent compliance with medication regimen. At this time, no barriers reported.  Gladys Villalobos [place] [id number]  [phone]          Goal: portion control for meals and incorporate stretching exercises daily.    Care Plan: Reviewed and updated where needed  Sending education on: stretching exercises   Time Spent This Encounter Total: 31 min medical record rev

## 2017-12-13 NOTE — TELEPHONE ENCOUNTER
Triage: Good Morning :)     Pt requesting refill on her Actos and Plavix states she only has 2 weeks left. Pt has follow up visit with Dr. Felice Camp on 1/12/2018. Ok to send to [employer].    Notified pt I would contact Dr. Felice Camp (triage) office and nurs

## 2017-12-18 RX ORDER — PIOGLITAZONEHYDROCHLORIDE 45 MG/1
45 TABLET ORAL NIGHTLY
Qty: 90 TABLET | Refills: 1 | Status: SHIPPED | OUTPATIENT
Start: 2017-12-18 | End: 2018-03-08

## 2017-12-18 RX ORDER — CLOPIDOGREL BISULFATE 75 MG/1
75 TABLET ORAL DAILY
Qty: 90 TABLET | Refills: 3 | Status: SHIPPED | OUTPATIENT
Start: 2017-12-18 | End: 2018-02-05

## 2017-12-31 ENCOUNTER — PRIOR ORIGINAL RECORDS (OUTPATIENT)
Dept: OTHER | Age: 67
End: 2017-12-31

## 2018-01-02 ENCOUNTER — OFFICE VISIT (OUTPATIENT)
Dept: FAMILY MEDICINE CLINIC | Facility: CLINIC | Age: 68
End: 2018-01-02

## 2018-01-02 VITALS
DIASTOLIC BLOOD PRESSURE: 78 MMHG | TEMPERATURE: 98 F | BODY MASS INDEX: 30.09 KG/M2 | RESPIRATION RATE: 12 BRPM | HEART RATE: 72 BPM | SYSTOLIC BLOOD PRESSURE: 138 MMHG | WEIGHT: 180.63 LBS | HEIGHT: 65 IN

## 2018-01-02 DIAGNOSIS — I69.351 HEMIPARESIS AFFECTING RIGHT SIDE AS LATE EFFECT OF STROKE (HCC): ICD-10-CM

## 2018-01-02 DIAGNOSIS — E11.65 UNCONTROLLED TYPE 2 DIABETES MELLITUS WITH INSULIN THERAPY (HCC): Chronic | ICD-10-CM

## 2018-01-02 DIAGNOSIS — I70.0 ATHEROSCLEROSIS OF AORTA (HCC): Chronic | ICD-10-CM

## 2018-01-02 DIAGNOSIS — Z79.4 UNCONTROLLED TYPE 2 DIABETES MELLITUS WITH INSULIN THERAPY (HCC): Chronic | ICD-10-CM

## 2018-01-02 DIAGNOSIS — L03.115 CELLULITIS OF RIGHT LOWER EXTREMITY: ICD-10-CM

## 2018-01-02 DIAGNOSIS — L97.911 ULCER OF RIGHT LOWER EXTREMITY, LIMITED TO BREAKDOWN OF SKIN (HCC): Primary | ICD-10-CM

## 2018-01-02 DIAGNOSIS — I73.9 PAD (PERIPHERAL ARTERY DISEASE) (HCC): ICD-10-CM

## 2018-01-02 PROCEDURE — 99214 OFFICE O/P EST MOD 30 MIN: CPT | Performed by: FAMILY MEDICINE

## 2018-01-02 RX ORDER — ACETAMINOPHEN AND CODEINE PHOSPHATE 300; 30 MG/1; MG/1
TABLET ORAL
Qty: 30 TABLET | Refills: 0 | OUTPATIENT
Start: 2018-01-02 | End: 2018-02-20

## 2018-01-02 RX ORDER — CLINDAMYCIN HYDROCHLORIDE 300 MG/1
300 CAPSULE ORAL 3 TIMES DAILY
Qty: 30 CAPSULE | Refills: 0 | Status: SHIPPED | OUTPATIENT
Start: 2018-01-02 | End: 2018-02-05 | Stop reason: ALTCHOICE

## 2018-01-02 RX ORDER — MUPIROCIN CALCIUM 20 MG/G
CREAM TOPICAL
Qty: 60 G | Refills: 1 | Status: SHIPPED | OUTPATIENT
Start: 2018-01-02 | End: 2018-01-02

## 2018-01-02 NOTE — PROGRESS NOTES
Miley Felix is a 78year old female. S:  Patient presents today with the following concerns:  · Noticed ulceration of the right leg around Yuly. Getting worse and worse. Has noticed blisters and oozing.   Now redness of the lower leg wit lancet by Finger stick route 4 (four) times daily. Use as directed. Disp: 2 Box Rfl: 11   atorvastatin 40 MG Oral Tab Take 1 tablet (40 mg total) by mouth nightly.  Disp: 90 tablet Rfl: 1   Alcohol Swabs (BD SWAB SINGLE USE REGULAR) Does not apply Pads Use [facility])     Atherosclerosis of aorta ([facility] [address]     Bilateral carotid artery disease ([facility] [address]     PAD (peripheral artery disease) (HCC)     Hemiparesis affecting right side as late effect of stroke ([facility] [address]     Onychomycosis     Localized edema     Depression     Diffi of aorta (hcc)  Pad (peripheral artery disease) (hcc)  Hemiparesis affecting right side as late effect of stroke (hcc)    No orders of the defined types were placed in this encounter.     Meds & Refills for this Visit:  Signed Prescriptions Disp Refills

## 2018-01-02 NOTE — PATIENT INSTRUCTIONS
Wash the area twice daily with soap and water. Apply bactroban cream to areas of deepest ulceration. Call wound clinic today to get appointment this week! Go to the [address] [facility] [facility] [facility] if develops worsening symptoms or fevers.

## 2018-01-08 ENCOUNTER — OFFICE VISIT (OUTPATIENT)
Dept: WOUND CARE | Facility: HOSPITAL | Age: 68
End: 2018-01-08
Attending: NURSE PRACTITIONER
Payer: MEDICARE

## 2018-01-08 ENCOUNTER — TELEPHONE (OUTPATIENT)
Dept: FAMILY MEDICINE CLINIC | Facility: CLINIC | Age: 68
End: 2018-01-08

## 2018-01-08 DIAGNOSIS — L97.929 CHRONIC VENOUS HYPERTENSION (IDIOPATHIC) WITH ULCER OF LEFT LOWER EXTREMITY (HCC): ICD-10-CM

## 2018-01-08 DIAGNOSIS — I87.312 CHRONIC VENOUS HYPERTENSION (IDIOPATHIC) WITH ULCER OF LEFT LOWER EXTREMITY (HCC): ICD-10-CM

## 2018-01-08 DIAGNOSIS — IMO0001 CHRONIC VENOUS HYPERTENSION WITH ULCER INVOLVING LEFT SIDE: Primary | ICD-10-CM

## 2018-01-08 DIAGNOSIS — I73.9 PERIPHERAL VASCULAR DISEASE, UNSPECIFIED (HCC): ICD-10-CM

## 2018-01-08 PROCEDURE — 11042 DBRDMT SUBQ TIS 1ST 20SQCM/<: CPT

## 2018-01-08 PROCEDURE — 29581 APPL MULTLAYER CMPRN SYS LEG: CPT

## 2018-01-08 PROCEDURE — 11045 DBRDMT SUBQ TISS EACH ADDL: CPT

## 2018-01-08 PROCEDURE — 99215 OFFICE O/P EST HI 40 MIN: CPT

## 2018-01-08 NOTE — PROGRESS NOTES
Subjective    Chief Complaint  This information was obtained from the patient  This 72 yo female patient presents to clinic for right lower leg wound, states she had a blister over the summer and wound has been progressively getting worse.  Pt states she ha Surgical History  This information was obtained from the patient  Patient has a surgical history of:  colonoscopy  breast surgery (left lumpectomy)    forearm/wrist surgery  angioplasty  Cholecystectomy    Complaints and Symptoms  This information furosemide 20 mg tablet oral tablet oral once daily  Tylenol-Codeine #3 300 mg-30 mg tablet oral tablet oral every 4-6 hours as needed  clopidogrel 75 mg tablet oral tablet oral once daily  Bactroban 2 % topical cream topical cream topical twice daily  fes The periwound skin exhibited: Edema, Dry/Scaly, Hemosiderosis. The temperature of the periwound skin is WNL. Periwound skin does not exhibit signs or symptoms of infection. Local Pulse is Weak.    General Notes:  measured as a cluster    Psychiatric:  Intac (Encounter Diagnosis) E11.40 - Type 2 diabetes mellitus with diabetic neuropathy, unspecified  (Encounter Diagnosis) I73.9 - Peripheral vascular disease, unspecified    Diagnoses    ICD-10  L97.221: Non-pressure chronic ulcer of left calf limited to breakd Kerlix  Paper tape  Change dressing every: - Twice a week, may change three times if necessary due to drainage    Compression Therapy:  Coflex calamine unna boot  Avoid prolonged standing in one place. Elevate leg(s)as much as possible.      Additional Ord

## 2018-01-10 PROBLEM — F32.A DEPRESSION: Status: RESOLVED | Noted: 2017-08-22 | Resolved: 2018-01-10

## 2018-01-10 PROBLEM — R60.0 LOCALIZED EDEMA: Status: RESOLVED | Noted: 2017-07-17 | Resolved: 2018-01-10

## 2018-01-10 PROBLEM — R26.2 DIFFICULTY WALKING: Status: RESOLVED | Noted: 2017-11-16 | Resolved: 2018-01-10

## 2018-01-10 PROBLEM — B35.1 ONYCHOMYCOSIS: Status: RESOLVED | Noted: 2017-05-04 | Resolved: 2018-01-10

## 2018-01-11 ENCOUNTER — APPOINTMENT (OUTPATIENT)
Dept: WOUND CARE | Facility: HOSPITAL | Age: 68
End: 2018-01-11
Attending: NURSE PRACTITIONER
Payer: MEDICARE

## 2018-01-11 NOTE — ASSESSMENT & PLAN NOTE
As for her Diabetes, it is well controlled, no significant medication side effects noted. Recommendations are: continue present meds, lose weight by increased dietary compliance and exercise and will check labs as ordered.       Lab Results  Component V

## 2018-01-11 NOTE — ASSESSMENT & PLAN NOTE
Stable, Continue present management.     Blood Pressure and Cardiac Medications          carvedilol 6.25 MG Oral Tab    Benazepril HCl 20 MG Oral Tab    Verapamil HCl  MG Oral Capsule SR 24 Hr

## 2018-01-11 NOTE — ASSESSMENT & PLAN NOTE
Continue to work on medical options, but pt in denial. Continue to work on therapeutic relationship  She is willing to try counseling so behavioral referral is given, consider Lexapro 10 mg in near future but she would like to hold off on medications today

## 2018-01-11 NOTE — ASSESSMENT & PLAN NOTE
Continue to follow supportively with PVD and poor DM control, [place], switch to stronger Norco 5 # 60 per month,

## 2018-01-12 ENCOUNTER — OFFICE VISIT (OUTPATIENT)
Dept: FAMILY MEDICINE CLINIC | Facility: CLINIC | Age: 68
End: 2018-01-12

## 2018-01-12 ENCOUNTER — TELEPHONE (OUTPATIENT)
Dept: FAMILY MEDICINE CLINIC | Facility: CLINIC | Age: 68
End: 2018-01-12

## 2018-01-12 VITALS
HEIGHT: 65 IN | DIASTOLIC BLOOD PRESSURE: 86 MMHG | TEMPERATURE: 98 F | WEIGHT: 180 LBS | HEART RATE: 92 BPM | BODY MASS INDEX: 29.99 KG/M2 | SYSTOLIC BLOOD PRESSURE: 134 MMHG | RESPIRATION RATE: 16 BRPM

## 2018-01-12 DIAGNOSIS — I67.2 CEREBRAL ATHEROSCLEROSIS: ICD-10-CM

## 2018-01-12 DIAGNOSIS — S32.040D CLOSED COMPRESSION FRACTURE OF L4 LUMBAR VERTEBRA WITH ROUTINE HEALING, SUBSEQUENT ENCOUNTER: Chronic | ICD-10-CM

## 2018-01-12 DIAGNOSIS — I70.0 ATHEROSCLEROSIS OF AORTA (HCC): Chronic | ICD-10-CM

## 2018-01-12 DIAGNOSIS — M81.6 LOCALIZED OSTEOPOROSIS WITHOUT CURRENT PATHOLOGICAL FRACTURE: ICD-10-CM

## 2018-01-12 DIAGNOSIS — D32.9 BENIGN MENINGIOMA (HCC): ICD-10-CM

## 2018-01-12 DIAGNOSIS — I73.9 PAD (PERIPHERAL ARTERY DISEASE) (HCC): ICD-10-CM

## 2018-01-12 DIAGNOSIS — Z23 NEED FOR VACCINATION: ICD-10-CM

## 2018-01-12 DIAGNOSIS — Z79.4 UNCONTROLLED TYPE 2 DIABETES MELLITUS WITH INSULIN THERAPY (HCC): Chronic | ICD-10-CM

## 2018-01-12 DIAGNOSIS — R27.8 TRUNCAL ATAXIA: ICD-10-CM

## 2018-01-12 DIAGNOSIS — L97.322 CHRONIC ULCER OF LEFT ANKLE WITH FAT LAYER EXPOSED (HCC): ICD-10-CM

## 2018-01-12 DIAGNOSIS — I69.351 HEMIPARESIS AFFECTING RIGHT SIDE AS LATE EFFECT OF STROKE (HCC): ICD-10-CM

## 2018-01-12 DIAGNOSIS — E87.1 HYPONATREMIA: ICD-10-CM

## 2018-01-12 DIAGNOSIS — E11.319 DIABETIC RETINOPATHY OF BOTH EYES ASSOCIATED WITH TYPE 2 DIABETES MELLITUS, MACULAR EDEMA PRESENCE UNSPECIFIED, UNSPECIFIED RETINOPATHY SEVERITY (HCC): Chronic | ICD-10-CM

## 2018-01-12 DIAGNOSIS — G47.33 OBSTRUCTIVE SLEEP APNEA: Chronic | ICD-10-CM

## 2018-01-12 DIAGNOSIS — I63.9 BRAINSTEM INFARCTION (HCC): ICD-10-CM

## 2018-01-12 DIAGNOSIS — E55.9 VITAMIN D DEFICIENCY: ICD-10-CM

## 2018-01-12 DIAGNOSIS — E11.65 UNCONTROLLED TYPE 2 DIABETES MELLITUS WITH INSULIN THERAPY (HCC): Chronic | ICD-10-CM

## 2018-01-12 DIAGNOSIS — C50.312 MALIGNANT NEOPLASM OF LOWER-INNER QUADRANT OF LEFT FEMALE BREAST, UNSPECIFIED ESTROGEN RECEPTOR STATUS (HCC): Chronic | ICD-10-CM

## 2018-01-12 DIAGNOSIS — I77.9 BILATERAL CAROTID ARTERY DISEASE (HCC): ICD-10-CM

## 2018-01-12 DIAGNOSIS — Z00.00 ENCOUNTER FOR ANNUAL HEALTH EXAMINATION: ICD-10-CM

## 2018-01-12 DIAGNOSIS — Z12.31 VISIT FOR SCREENING MAMMOGRAM: ICD-10-CM

## 2018-01-12 DIAGNOSIS — I10 HYPERTENSION GOAL BP (BLOOD PRESSURE) < 130/80: Chronic | ICD-10-CM

## 2018-01-12 DIAGNOSIS — E78.5 HYPERLIPIDEMIA LDL GOAL <100: Chronic | ICD-10-CM

## 2018-01-12 DIAGNOSIS — I25.10 CAD, MULTIPLE VESSEL: Chronic | ICD-10-CM

## 2018-01-12 DIAGNOSIS — Z11.59 NEED FOR HEPATITIS C SCREENING TEST: ICD-10-CM

## 2018-01-12 DIAGNOSIS — F32.1 MODERATE SINGLE CURRENT EPISODE OF MAJOR DEPRESSIVE DISORDER (HCC): ICD-10-CM

## 2018-01-12 DIAGNOSIS — Z00.00 ANNUAL PHYSICAL EXAM: Primary | ICD-10-CM

## 2018-01-12 PROCEDURE — G0438 PPPS, INITIAL VISIT: HCPCS | Performed by: FAMILY MEDICINE

## 2018-01-12 PROCEDURE — 90732 PPSV23 VACC 2 YRS+ SUBQ/IM: CPT | Performed by: FAMILY MEDICINE

## 2018-01-12 PROCEDURE — G0009 ADMIN PNEUMOCOCCAL VACCINE: HCPCS | Performed by: FAMILY MEDICINE

## 2018-01-12 PROCEDURE — 96160 PT-FOCUSED HLTH RISK ASSMT: CPT | Performed by: FAMILY MEDICINE

## 2018-01-12 RX ORDER — HYDROCODONE BITARTRATE AND ACETAMINOPHEN 5; 325 MG/1; MG/1
1 TABLET ORAL EVERY 8 HOURS PRN
Qty: 60 TABLET | Refills: 0 | Status: SHIPPED | OUTPATIENT
Start: 2018-01-12 | End: 2018-02-11

## 2018-01-12 RX ORDER — ASPIRIN 81 MG/1
81 TABLET ORAL DAILY
Qty: 30 TABLET | Refills: 11 | COMMUNITY
Start: 2018-01-12 | End: 2019-02-26

## 2018-01-12 RX ORDER — HYDROCODONE BITARTRATE AND ACETAMINOPHEN 5; 325 MG/1; MG/1
1 TABLET ORAL EVERY 8 HOURS PRN
Qty: 60 TABLET | Refills: 0 | Status: SHIPPED | OUTPATIENT
Start: 2018-03-13 | End: 2018-04-12

## 2018-01-12 RX ORDER — HYDROCODONE BITARTRATE AND ACETAMINOPHEN 5; 325 MG/1; MG/1
1 TABLET ORAL EVERY 8 HOURS PRN
Qty: 60 TABLET | Refills: 0 | Status: SHIPPED | OUTPATIENT
Start: 2018-02-11 | End: 2018-02-27

## 2018-01-12 NOTE — TELEPHONE ENCOUNTER
Karlie Brumfield is calling from [facility] requesting an order to be placed for nursing and physical therapy. Karlie Brumfield can be reached at [phone] ext [phone] and would like orders to be faxed to her at [phone].

## 2018-01-12 NOTE — PATIENT INSTRUCTIONS
[facility] SCREENING SCHEDULE   Tests on this list are recommended by your physician but may not be covered, or covered at this frequency, by your insurer. Please check with your insurance carrier before scheduling to verify coverage.    PREV found for this or any previous visit.  Limited to patients who meet one of the following criteria:   • Men who are 73-68 years old and have smoked more than 100 cigarettes in their lifetime   • Anyone with a family history    Colorectal Cancer Screening  Co 08/21/2017 Please get this Mammogram regularly   Immunizations      Influenza  Covered Annually   Orders placed or performed in visit on 12/05/17  -FLU VACC HIGH DOSE PRSV FREE   -ADMIN INFLUENZA VIRUS VAC   Orders placed or performed in visit on 09/28/16 http://www. idph.state. [place]public/books/advin.htm  A link to the [employer]. This site has a lot of good information including definitions of the different types of Advance Directives.  It also has the State forms available on it's webs surfaces. ? Avoid walking on snowy or icy surfaces. ? Use a cane or walker (indoors and out) if you are unsteady on your feet.

## 2018-01-12 NOTE — PROGRESS NOTES
HPI:   Amadou Navas is a 78year old female who presents for a MA (Medicare Advantage) Supervisit (Once per calendar year). Feels awful seeing wound clinic, and they are caring for right lower leg.  Also, has lots of sleep ing and fatigue all th with Daily Activities based on screening of functional status. Problems with daily activities? : Yes   She has problems with Memory based on screening of functional status.    Memory Problems?: Yes     Memory seems to be okay but she is having difficulty disease ([address]     PAD (peripheral artery disease) ([address]     Hemiparesis affecting right side as late effect of stroke ([address]    Wt Readings from Last 3 Encounters:  01/12/18 : 180 lb  01/02/18 : 180 lb 9.6 oz  12/05/17 : 188 lb     Last Cholesterol Labs: with meals.    Blood Glucose Calibration (ACCU-CHEK ANN MARIE) In Vitro Solution Use to check calibration of monitor   glimepiride 4 MG Oral Tab TAKE ONE TABLET BY MOUTH IN THE MORNING BEFORE BREAKFAST   Benazepril HCl 20 MG Oral Tab Take 1 tablet (20 mg total) ([facility] (2/12/2014); Diverticulitis; Facial cellulitis (4/14/2015); Heart attack; NSTEMI (non-ST elevated myocardial infarction) ([facility] (12/6/2013); Obstructive sleep apnea; Stroke [facility]); Supraventricular tachycardia ([facility] (3/31/2014);  Unspecified essential h swelling. Skin: Negative. Negative for rash. Allergic/Immunologic: Negative for immunocompromised state. Neurological: Positive for weakness. Negative for dizziness and numbness. Hematological: Negative for adenopathy.    Psychiatric/Behavioral: Po pulses. Exam reveals no gallop, no S3, no S4 and no friction rub. No murmur heard. Edema not present. Carotid bruit not present. Pulmonary/Chest: Effort normal and breath sounds normal. No respiratory distress. She has no decreased breath sounds.  She meningioma ([facility]  -     NEURO - INTERNAL    Cerebral atherosclerosis    Hemiparesis affecting right side as late effect of stroke (HCC)  -     NEURO - INTERNAL    Moderate single current episode of major depressive disorder (HCC)  -      NAVIGATOR    Hyp assessment: good     PLAN:  The patient indicates understanding of these issues and agrees to the plan.   Problem List Items Addressed This Visit        Cardiovascular    CAD, multiple vessel (Chronic)    Overview     4.2015, NSTEMI 2013, Dr Silverio Lin Recommendations are: continue present meds, lose weight by increased dietary compliance and exercise and will check labs as ordered.       Lab Results  Component Value Date   A1C 8.3 (A) 12/05/2017   A1C 7.8 (H) 08/08/2017      Blood Sugar Medications Assessment & Plan     Still very weak, working on long term plan         Relevant Orders    NEURO - [address]    Moderate single current episode of major depressive disorder ([facility]    Overview     Occurring after stroke in fall 2016, not MG Oral Tab (Start on 3/13/2018)       Other    Bilateral carotid artery disease ([facility]    Overview     2/23/2106 US Carotid Doppler         Current Assessment & Plan     Stable on statins         Relevant Orders    ONCOLOGY/HEMATOLOGY - INTERNAL      Other Screening      Colonoscopy Screen every 10 years Colonoscopy,5 Years due on 11/26/2018 Update Health Maintenance if applicable    Flex Sigmoidoscopy Screen every 10 years No results found for this or any previous visit. No flowsheet data found.      Fecal O cover unless Medically needed    Zoster   Not covered by Medicare Part B No vaccine history found This may be covered with your pharmacy  prescription benefits      [facility] Internal Lab or Procedure External Lab or Procedure      Annual

## 2018-01-15 ENCOUNTER — PATIENT OUTREACH (OUTPATIENT)
Dept: CASE MANAGEMENT | Age: 68
End: 2018-01-15

## 2018-01-15 ENCOUNTER — OFFICE VISIT (OUTPATIENT)
Dept: WOUND CARE | Facility: HOSPITAL | Age: 68
End: 2018-01-15
Attending: NURSE PRACTITIONER
Payer: MEDICARE

## 2018-01-15 DIAGNOSIS — I87.312 CHRONIC VENOUS HYPERTENSION (IDIOPATHIC) WITH ULCER OF LEFT LOWER EXTREMITY (HCC): ICD-10-CM

## 2018-01-15 DIAGNOSIS — I73.9 PERIPHERAL VASCULAR DISEASE, UNSPECIFIED (HCC): ICD-10-CM

## 2018-01-15 DIAGNOSIS — IMO0001 CHRONIC VENOUS HYPERTENSION WITH ULCER INVOLVING LEFT SIDE: Primary | ICD-10-CM

## 2018-01-15 DIAGNOSIS — L97.929 CHRONIC VENOUS HYPERTENSION (IDIOPATHIC) WITH ULCER OF LEFT LOWER EXTREMITY (HCC): ICD-10-CM

## 2018-01-15 PROCEDURE — 11045 DBRDMT SUBQ TISS EACH ADDL: CPT

## 2018-01-15 PROCEDURE — 29581 APPL MULTLAYER CMPRN SYS LEG: CPT

## 2018-01-15 PROCEDURE — 11042 DBRDMT SUBQ TIS 1ST 20SQCM/<: CPT

## 2018-01-15 NOTE — PROGRESS NOTES
Subjective    Chief Complaint  This information was obtained from the patient  This 72 yo female patient presents to clinic for right lower leg wound.  Pt states she has been having a lot of trouble with dressing, states she would like to discuss another op Constitutional Symptoms (General Health): Fever, Loss of Appetite, Chills  Respiratory: Cough, Shortness of Breath, Wheezing  Cardiovascular (Central/Peripheral):  Intermittent Claudication, Lower extremity (leg) resting pain  Gastrointestinal (GI): Nausea Wound #3 Right, Lateral, Posterior Lower Leg is an acute Full Thickness Vasculitic Ulcer and has received a status of improved.  Subsequent wound encounter measurements are 5.6cm length x 17.5cm width x 0.1cm depth, with an area of 98 sq cm and a volume of Right Dorsalis Pedis Pulse: Monophasic        Assessment    Diagnoses    ICD-10  L97.221: Non-pressure chronic ulcer of left calf limited to breakdown of skin  I87.301: Chronic venous hypertension (idiopathic) without complications of right lower extremity Kerlix  Paper tape  Change dressing every: - Twice a week, may change three times if necessary due to drainage    Compression Therapy:  Coflex calamine unna boot  Avoid prolonged standing in one place. Elevate leg(s)as much as possible.      Additional Ord

## 2018-01-15 NOTE — PROGRESS NOTES
Attempted to contact pt no answer left detailed message for pt to call back. Will try again in a couple weeks.    Total time spent with patient including chart review: 2  Time spent with patient this month: 2    Total time spent with communication and chart

## 2018-01-16 ENCOUNTER — TELEPHONE (OUTPATIENT)
Dept: FAMILY MEDICINE CLINIC | Facility: CLINIC | Age: 68
End: 2018-01-16

## 2018-01-16 DIAGNOSIS — E11.40 DIABETIC NEUROPATHY, PAINFUL (HCC): ICD-10-CM

## 2018-01-16 DIAGNOSIS — S32.040D CLOSED COMPRESSION FRACTURE OF L4 LUMBAR VERTEBRA WITH ROUTINE HEALING, SUBSEQUENT ENCOUNTER: Chronic | ICD-10-CM

## 2018-01-16 DIAGNOSIS — I69.351 HEMIPARESIS AFFECTING RIGHT SIDE AS LATE EFFECT OF STROKE (HCC): ICD-10-CM

## 2018-01-16 DIAGNOSIS — I73.9 PAD (PERIPHERAL ARTERY DISEASE) (HCC): ICD-10-CM

## 2018-01-16 DIAGNOSIS — I87.301 VENOUS HYPERTENSION, CHRONIC, RIGHT: ICD-10-CM

## 2018-01-16 DIAGNOSIS — L97.221 NON-PRESSURE CHRONIC ULCER OF LEFT CALF, LIMITED TO BREAKDOWN OF SKIN (HCC): Primary | ICD-10-CM

## 2018-01-16 NOTE — TELEPHONE ENCOUNTER
Yevgeniy Ortega from [facility] is calling to request referrals for nursing and physical therapy. Yevgeniy Ortega spoke to the referral dept and was transferred to our office for request.   Please follow up with Yevgeniy Ortega at [phone] ext.  [facility]

## 2018-01-17 NOTE — TELEPHONE ENCOUNTER
Dr Sridhar Snowden, the [address] [place] [address] at [facility] has requested [facility] RN evaluation and care with dressing changes for leg wounds.    Referral to [facility] PENDING above     [facility]   RN Evaluation and Care with dressing changes on bilateral leg wou

## 2018-01-17 NOTE — TELEPHONE ENCOUNTER
Reviewed EPIC and the Wound Clinic at [facility] on visit 01/08/2018, mentioned [facility], however a Referral was never entered for [facility] to approve.

## 2018-01-17 NOTE — TELEPHONE ENCOUNTER
[address], spoke to Kiel Brewer who seemed to be confused on what they were being requested for. At first she thought it was for a hospital follow up.  I question when the Pt was in the hospital, she then changed her request to a would check (she

## 2018-01-22 ENCOUNTER — APPOINTMENT (OUTPATIENT)
Dept: WOUND CARE | Facility: HOSPITAL | Age: 68
End: 2018-01-22
Attending: NURSE PRACTITIONER
Payer: MEDICARE

## 2018-01-23 ENCOUNTER — OFFICE VISIT (OUTPATIENT)
Dept: WOUND CARE | Facility: HOSPITAL | Age: 68
End: 2018-01-23
Attending: NURSE PRACTITIONER
Payer: MEDICARE

## 2018-01-23 DIAGNOSIS — L08.9 INFECTED WOUND: Primary | ICD-10-CM

## 2018-01-23 DIAGNOSIS — I87.312 CHRONIC VENOUS HYPERTENSION (IDIOPATHIC) WITH ULCER OF LEFT LOWER EXTREMITY (HCC): ICD-10-CM

## 2018-01-23 DIAGNOSIS — IMO0001 CHRONIC VENOUS HYPERTENSION WITH ULCER INVOLVING LEFT SIDE: ICD-10-CM

## 2018-01-23 DIAGNOSIS — I73.9 PERIPHERAL VASCULAR DISEASE, UNSPECIFIED (HCC): ICD-10-CM

## 2018-01-23 DIAGNOSIS — T14.8XXA INFECTED WOUND: Primary | ICD-10-CM

## 2018-01-23 DIAGNOSIS — L97.929 CHRONIC VENOUS HYPERTENSION (IDIOPATHIC) WITH ULCER OF LEFT LOWER EXTREMITY (HCC): ICD-10-CM

## 2018-01-23 PROCEDURE — 87077 CULTURE AEROBIC IDENTIFY: CPT

## 2018-01-23 PROCEDURE — 87205 SMEAR GRAM STAIN: CPT

## 2018-01-23 PROCEDURE — 87070 CULTURE OTHR SPECIMN AEROBIC: CPT

## 2018-01-23 PROCEDURE — 29581 APPL MULTLAYER CMPRN SYS LEG: CPT

## 2018-01-23 PROCEDURE — 87186 SC STD MICRODIL/AGAR DIL: CPT

## 2018-01-23 NOTE — PROGRESS NOTES
Chief Complaint  This information was obtained from the patient  Patient is here for a follow up for the RLE. Patient states the home nurse put a spray on her wound with alcohol because she couldn't get the powder.  Patient had increased pain after it was s Notes:  Culture taken today d/t odor and increased drainage. Abx sent to pharmacy. Orders placed for blood work. Changed dressing to silver aquacel and continue with unna wrap. Will contact [facility] re: new orders.  NAZARIO Jarvis    Complaints and Symptoms  This i varicosities, + edema, +hyperpigmentation. Capillary refill < 3 seconds. Digits are warm. toenails are slightly thickned and yellowed, long in length and lacking hygeine. feet are very dry with some fissuring. no hairgrowth on legs and feet. .     Logan Serna mellitus with diabetic neuropathy, unspecified  (Encounter Diagnosis) I73.9 - Peripheral vascular disease, unspecified  (Encounter Diagnosis) Z79.4 - Long term (current) use of insulin        Plan    Wound Orders:  Wound #3 Right, Lateral, Posterior Lower take a cx but let her know that I wanted to cover for pseudo.  she has tolerated cipro in the past.    we discussed blood flow in and out, controlling edema, and watching her bs.    patient c/o pain but I was able to cleanse the wound quite agressively with

## 2018-01-25 ENCOUNTER — TELEPHONE (OUTPATIENT)
Dept: FAMILY MEDICINE CLINIC | Facility: CLINIC | Age: 68
End: 2018-01-25

## 2018-01-25 DIAGNOSIS — Z79.4 UNCONTROLLED TYPE 2 DIABETES MELLITUS WITH INSULIN THERAPY (HCC): Primary | Chronic | ICD-10-CM

## 2018-01-25 DIAGNOSIS — E11.65 UNCONTROLLED TYPE 2 DIABETES MELLITUS WITH INSULIN THERAPY (HCC): Primary | Chronic | ICD-10-CM

## 2018-01-25 DIAGNOSIS — L97.322 CHRONIC ULCER OF LEFT ANKLE WITH FAT LAYER EXPOSED (HCC): ICD-10-CM

## 2018-01-25 NOTE — TELEPHONE ENCOUNTER
Called and talked to patient she is having BM's but they are hard suggested colace OTC and increasing fluids she will try this and let us know how it works

## 2018-01-27 ENCOUNTER — HOSPITAL ENCOUNTER (EMERGENCY)
Facility: HOSPITAL | Age: 68
Discharge: HOME | End: 2018-01-27
Attending: EMERGENCY MEDICINE
Payer: MEDICARE

## 2018-01-27 VITALS
WEIGHT: 180 LBS | SYSTOLIC BLOOD PRESSURE: 185 MMHG | BODY MASS INDEX: 30.73 KG/M2 | HEART RATE: 85 BPM | HEIGHT: 64 IN | TEMPERATURE: 99 F | DIASTOLIC BLOOD PRESSURE: 85 MMHG | OXYGEN SATURATION: 96 % | RESPIRATION RATE: 16 BRPM

## 2018-01-27 DIAGNOSIS — T14.8XXA BLEEDING FROM WOUND: Primary | ICD-10-CM

## 2018-01-27 LAB
ALBUMIN SERPL-MCNC: 3 G/DL (ref 3.5–4.8)
ALP LIVER SERPL-CCNC: 76 U/L (ref 55–142)
ALT SERPL-CCNC: 16 U/L (ref 14–54)
APTT PPP: 31 SECONDS (ref 25–34)
AST SERPL-CCNC: 13 U/L (ref 15–41)
BASOPHILS # BLD AUTO: 0.03 X10(3) UL (ref 0–0.1)
BASOPHILS NFR BLD AUTO: 0.5 %
BILIRUB SERPL-MCNC: 0.3 MG/DL (ref 0.1–2)
BUN BLD-MCNC: 14 MG/DL (ref 8–20)
CALCIUM BLD-MCNC: 8.2 MG/DL (ref 8.3–10.3)
CHLORIDE: 105 MMOL/L (ref 101–111)
CO2: 27 MMOL/L (ref 22–32)
CREAT BLD-MCNC: 0.68 MG/DL (ref 0.55–1.02)
EOSINOPHIL # BLD AUTO: 0.12 X10(3) UL (ref 0–0.3)
EOSINOPHIL NFR BLD AUTO: 2 %
ERYTHROCYTE [DISTWIDTH] IN BLOOD BY AUTOMATED COUNT: 11.9 % (ref 11.5–16)
GLUCOSE BLD-MCNC: 227 MG/DL (ref 70–99)
HCT VFR BLD AUTO: 33.7 % (ref 34–50)
HGB BLD-MCNC: 11.1 G/DL (ref 12–16)
IMMATURE GRANULOCYTE COUNT: 0.02 X10(3) UL (ref 0–1)
IMMATURE GRANULOCYTE RATIO %: 0.3 %
INR BLD: 1.12 (ref 0.89–1.11)
LYMPHOCYTES # BLD AUTO: 1.12 X10(3) UL (ref 0.9–4)
LYMPHOCYTES NFR BLD AUTO: 18.3 %
M PROTEIN MFR SERPL ELPH: 7.6 G/DL (ref 6.1–8.3)
MCH RBC QN AUTO: 28.8 PG (ref 27–33.2)
MCHC RBC AUTO-ENTMCNC: 32.9 G/DL (ref 31–37)
MCV RBC AUTO: 87.5 FL (ref 81–100)
MONOCYTES # BLD AUTO: 0.35 X10(3) UL (ref 0.1–0.6)
MONOCYTES NFR BLD AUTO: 5.7 %
NEUTROPHIL ABS PRELIM: 4.47 X10 (3) UL (ref 1.3–6.7)
NEUTROPHILS # BLD AUTO: 4.47 X10(3) UL (ref 1.3–6.7)
NEUTROPHILS NFR BLD AUTO: 73.2 %
PLATELET # BLD AUTO: 246 10(3)UL (ref 150–450)
POTASSIUM SERPL-SCNC: 4 MMOL/L (ref 3.6–5.1)
PSA SERPL DL<=0.01 NG/ML-MCNC: 14.5 SECONDS (ref 12–14.3)
RBC # BLD AUTO: 3.85 X10(6)UL (ref 3.8–5.1)
RED CELL DISTRIBUTION WIDTH-SD: 38.3 FL (ref 35.1–46.3)
SODIUM SERPL-SCNC: 139 MMOL/L (ref 136–144)
WBC # BLD AUTO: 6.1 X10(3) UL (ref 4–13)

## 2018-01-27 PROCEDURE — 85610 PROTHROMBIN TIME: CPT | Performed by: EMERGENCY MEDICINE

## 2018-01-27 PROCEDURE — 85730 THROMBOPLASTIN TIME PARTIAL: CPT | Performed by: EMERGENCY MEDICINE

## 2018-01-27 PROCEDURE — 85025 COMPLETE CBC W/AUTO DIFF WBC: CPT | Performed by: EMERGENCY MEDICINE

## 2018-01-27 PROCEDURE — 80053 COMPREHEN METABOLIC PANEL: CPT | Performed by: EMERGENCY MEDICINE

## 2018-01-27 PROCEDURE — 99283 EMERGENCY DEPT VISIT LOW MDM: CPT

## 2018-01-27 PROCEDURE — 36415 COLL VENOUS BLD VENIPUNCTURE: CPT

## 2018-01-27 RX ORDER — HYDROCODONE BITARTRATE AND ACETAMINOPHEN 5; 325 MG/1; MG/1
TABLET ORAL
Status: DISCONTINUED
Start: 2018-01-27 | End: 2018-01-27

## 2018-01-27 RX ORDER — HYDROCODONE BITARTRATE AND ACETAMINOPHEN 5; 325 MG/1; MG/1
1 TABLET ORAL ONCE
Status: COMPLETED | OUTPATIENT
Start: 2018-01-27 | End: 2018-01-27

## 2018-01-27 NOTE — ED PROVIDER NOTES
Patient Seen in: [facility] Emergency Department    History   Patient presents with:  Laceration Abrasion (integumentary)    Stated Complaint: wound     HPI    Patient has been under the care of wound clinic for a wound on the right lower extremity CHOLECYSTECTOMY      Comment: laparoscopic May 2012 [address] [facility] [address] [facility] [address] Fonnie Castleman  1/1/08: COLONOSCOPY  11/26/2013: COLONOSCOPY      Comment: Procedure: COLONOSCOPY;  Surgeon: Chepe Tijerina MD;  Location: [address] [place] [address] ENDOSCOPY  1/1/08: FOREARM/WRIST SURGERY Marisa Warren following:        Result Value    PT 14.5 (*)     INR 1.12 (*)     All other components within normal limits   COMP METABOLIC PANEL (14) - Abnormal; Notable for the following:     Glucose 227 (*)     Calcium, Total 8.2 (*)     AST 13 (*)     Albumin 3.0 Juan Rudy is ambulatory, feels much better, and feels ready to go home.         Disposition and Plan     Clinical Impression:  Bleeding from wound  (primary encounter diagnosis)    Disposition:  Discharge  1/27/2018 12:40 pm    Follow-up:  Pilar Neves MD  [address] [place] [address]

## 2018-01-27 NOTE — ED INITIAL ASSESSMENT (HPI)
Pt had home health RN at home come check on right lower leg wound, states dressing had some drainage on it but when RN pulled dressing off wound was heavily draining. Pt states dressing was stuck to the wound when RN removed.   Pt was diagnosed with an inf

## 2018-01-29 ENCOUNTER — TELEPHONE (OUTPATIENT)
Dept: FAMILY MEDICINE CLINIC | Facility: CLINIC | Age: 68
End: 2018-01-29

## 2018-01-29 ENCOUNTER — OFFICE VISIT (OUTPATIENT)
Dept: WOUND CARE | Facility: HOSPITAL | Age: 68
End: 2018-01-29
Attending: NURSE PRACTITIONER
Payer: MEDICARE

## 2018-01-29 DIAGNOSIS — L97.929 CHRONIC VENOUS HYPERTENSION (IDIOPATHIC) WITH ULCER OF LEFT LOWER EXTREMITY (HCC): ICD-10-CM

## 2018-01-29 DIAGNOSIS — IMO0001 CHRONIC VENOUS HYPERTENSION WITH ULCER INVOLVING LEFT SIDE: Primary | ICD-10-CM

## 2018-01-29 DIAGNOSIS — I73.9 PERIPHERAL VASCULAR DISEASE, UNSPECIFIED (HCC): ICD-10-CM

## 2018-01-29 DIAGNOSIS — I87.312 CHRONIC VENOUS HYPERTENSION (IDIOPATHIC) WITH ULCER OF LEFT LOWER EXTREMITY (HCC): ICD-10-CM

## 2018-01-29 PROCEDURE — 97597 DBRDMT OPN WND 1ST 20 CM/<: CPT

## 2018-01-29 PROCEDURE — 97598 DBRDMT OPN WND ADDL 20CM/<: CPT

## 2018-01-29 PROCEDURE — 29581 APPL MULTLAYER CMPRN SYS LEG: CPT

## 2018-01-29 NOTE — TELEPHONE ENCOUNTER
Pt went to see Wound clinic today for right leg wound,a day sooner due to continuous bleeding. Wound clinic documented moderate amount of sero-sanguinous drainage. Labs were checked by ED on 1/27/18 for same issue.    Pt was asked to check with Dr Jac Bennett if P

## 2018-01-29 NOTE — TELEPHONE ENCOUNTER
Patient was seen by wound clinic today and was advised to contact Dr. Samuel Singh and find out if she needs to stop current medication she's taking due to wound bleeding.

## 2018-01-29 NOTE — TELEPHONE ENCOUNTER
Major CVA 1.5 years ago, I will contact Dr Rich Fernandez about which to possibly hold as he is managing her anticoagulation risk.

## 2018-01-29 NOTE — PROGRESS NOTES
Subjective    Chief Complaint  This information was obtained from the patient  Patient is here for a follow up for the RLE. Patient stated that she noticed\" heavy bleeding on her right leg last night\".     Allergies  adhesive tape    HPI  This information 1/29/18: Pt returns for follow up, has been taking Cipro for wound infection and tolerating well. She was in the ER on Saturday because wound was bleeding heavily and home health RN was unable to get it to stop.  Since then states she has had some bleeding Medication reconciliation completed at today's visit. : Yes        Objective    Constitutional  BP WNL. Pulse RRR. RR within normal limits. Afebrile. Elevated BMI. Alert, calm, well developed, in no apparent distress.  Height/Length: 65 in (165.1 cm), Weigh Calf Measurement 36 cm from heel   Ankle Measurement 12 cm from heel  Right Extremity: Edema is present   Compression Device In Use: No   Calf Measurement 36 cm from heel with right measurement of 36 cm   Ankle Measurement 12 cm from heel with right measur General Notes:  Applied dermoplast spray    Wound #3 (Venous Ulcer) is located on the right, lateral, posterior lower leg. A Multi-Layer Compression Wrap procedure was performed. A 2 Layers Unna Boot was applied with moderate 15-25 mmhg.  The procedure was Discussed with patient and spouse that bleeding is likey result of plavix and asa as her blood work done in ER was unremarkable. They can however follow up with primary if they desire.  No current s/s of infection and no need to continue antibiotics once co

## 2018-01-29 NOTE — TELEPHONE ENCOUNTER
Talked to patient she is upset about nurse changing dressing and it started to bleed and ended up in ED bleeding stopped and they sent her home but they are sending out a home health nurse to look at the wound.  She does not want this and has an appointment

## 2018-01-29 NOTE — TELEPHONE ENCOUNTER
Patient was seen Saturday by home health nurse to change dressing and when she took the dressing off she started to bleed they could not stop the bleeding and went to ER at [facility].   She was released and told to go to the wound clinic and no one is availabl

## 2018-01-30 ENCOUNTER — APPOINTMENT (OUTPATIENT)
Dept: WOUND CARE | Facility: HOSPITAL | Age: 68
End: 2018-01-30
Attending: NURSE PRACTITIONER
Payer: MEDICARE

## 2018-01-30 NOTE — TELEPHONE ENCOUNTER
Patient called back I told her Dr Prisca Maria consulted with CV and Neuro and it is OK to stop ASA but continue plavix, so she will do this and contact us if bleeding does not stop

## 2018-01-30 NOTE — TELEPHONE ENCOUNTER
Discussed with CV and Neurology-    OK to hold aspirin, but CONTINUE plavix with CV and CVA hx.    Ok to notify

## 2018-01-31 ENCOUNTER — PATIENT OUTREACH (OUTPATIENT)
Dept: CASE MANAGEMENT | Age: 68
End: 2018-01-31

## 2018-01-31 NOTE — PROGRESS NOTES
Attempted to contact pt no answer left detailed message for pt to call back. Will try again in a couple weeks.    Total time spent with patient including chart review: 2  Time spent with patient this month: 11    Total time spent with communication and loly

## 2018-02-01 ENCOUNTER — TELEPHONE (OUTPATIENT)
Dept: FAMILY MEDICINE CLINIC | Facility: CLINIC | Age: 68
End: 2018-02-01

## 2018-02-01 DIAGNOSIS — E11.65 UNCONTROLLED TYPE 2 DIABETES MELLITUS WITH INSULIN THERAPY (HCC): Primary | Chronic | ICD-10-CM

## 2018-02-01 DIAGNOSIS — Z79.4 UNCONTROLLED TYPE 2 DIABETES MELLITUS WITH INSULIN THERAPY (HCC): Primary | Chronic | ICD-10-CM

## 2018-02-01 NOTE — TELEPHONE ENCOUNTER
Patient currently sees her podiatrist every 3 months but needs to see them every other month and was told by her insurance that this change needs to be made by her PCP.  Patient looking for a new referral to state every other month instead of every 3 months

## 2018-02-02 ENCOUNTER — TELEPHONE (OUTPATIENT)
Dept: FAMILY MEDICINE CLINIC | Facility: CLINIC | Age: 68
End: 2018-02-02

## 2018-02-02 RX ORDER — GABAPENTIN 300 MG/1
300 CAPSULE ORAL 3 TIMES DAILY
Qty: 270 CAPSULE | Refills: 0 | Status: SHIPPED | OUTPATIENT
Start: 2018-02-02 | End: 2018-02-12 | Stop reason: ALTCHOICE

## 2018-02-02 NOTE — TELEPHONE ENCOUNTER
Home health told her she needed a new prescription for Neuropathy. Please call into [facility] [employer].

## 2018-02-02 NOTE — TELEPHONE ENCOUNTER
Please notify: Good for gabapentin 300 sent, start 1 nightly and titrate up to 3 times daily as tolerated over the course of 2 or 3 days      Signed Prescriptions Disp Refills    gabapentin 300 MG Oral Cap 270 capsule 0      Sig: Take 1 capsule (300 mg tot

## 2018-02-02 NOTE — TELEPHONE ENCOUNTER
Patient has neuropathy in her legs. She is wondering if she can start on a medication for this? [facility] is not covering neuropathy pain.      Routed to Dr. Felice Camp

## 2018-02-02 NOTE — TELEPHONE ENCOUNTER
Pt states she goes to podiatrist for nail trimming and was advised to come every other month.  Pt has diabetes  Will you authorize referral?

## 2018-02-02 NOTE — TELEPHONE ENCOUNTER
Per patient, her home health nurse asked her to call our office to request more visits. Typically this request originates from the [facility], so I requested the nurse's contact information from the patient. Kalpesh Monge - home health nurse with [facility].   [phone]

## 2018-02-02 NOTE — TELEPHONE ENCOUNTER
Patient called in stating that [address] [facility] told her she needed to have more visits added to her existing order. They want to continue her care longer. Please call to verify.

## 2018-02-05 ENCOUNTER — OFFICE VISIT (OUTPATIENT)
Dept: NEUROLOGY | Facility: CLINIC | Age: 68
End: 2018-02-05

## 2018-02-05 ENCOUNTER — OFFICE VISIT (OUTPATIENT)
Dept: WOUND CARE | Facility: HOSPITAL | Age: 68
End: 2018-02-05
Attending: NURSE PRACTITIONER
Payer: MEDICARE

## 2018-02-05 VITALS — SYSTOLIC BLOOD PRESSURE: 122 MMHG | RESPIRATION RATE: 16 BRPM | DIASTOLIC BLOOD PRESSURE: 78 MMHG | HEART RATE: 76 BPM

## 2018-02-05 DIAGNOSIS — I87.312 CHRONIC VENOUS HYPERTENSION (IDIOPATHIC) WITH ULCER OF LEFT LOWER EXTREMITY (HCC): ICD-10-CM

## 2018-02-05 DIAGNOSIS — L97.929 CHRONIC VENOUS HYPERTENSION (IDIOPATHIC) WITH ULCER OF LEFT LOWER EXTREMITY (HCC): ICD-10-CM

## 2018-02-05 DIAGNOSIS — I63.9 BRAINSTEM INFARCTION (HCC): Primary | ICD-10-CM

## 2018-02-05 DIAGNOSIS — IMO0001 CHRONIC VENOUS HYPERTENSION WITH ULCER INVOLVING LEFT SIDE: Primary | ICD-10-CM

## 2018-02-05 DIAGNOSIS — E78.5 HYPERLIPIDEMIA LDL GOAL <100: Chronic | ICD-10-CM

## 2018-02-05 DIAGNOSIS — D32.9 BENIGN MENINGIOMA (HCC): ICD-10-CM

## 2018-02-05 DIAGNOSIS — I73.9 PERIPHERAL VASCULAR DISEASE, UNSPECIFIED (HCC): ICD-10-CM

## 2018-02-05 PROCEDURE — 29581 APPL MULTLAYER CMPRN SYS LEG: CPT

## 2018-02-05 PROCEDURE — 99213 OFFICE O/P EST LOW 20 MIN: CPT | Performed by: OTHER

## 2018-02-05 RX ORDER — CLOPIDOGREL BISULFATE 75 MG/1
75 TABLET ORAL DAILY
Qty: 90 TABLET | Refills: 3 | Status: SHIPPED | OUTPATIENT
Start: 2018-02-05 | End: 2019-05-07

## 2018-02-05 RX ORDER — ATORVASTATIN CALCIUM 40 MG/1
40 TABLET, FILM COATED ORAL NIGHTLY
Qty: 90 TABLET | Refills: 1 | Status: SHIPPED | OUTPATIENT
Start: 2018-02-05 | End: 2018-07-31

## 2018-02-05 NOTE — PATIENT INSTRUCTIONS
Refill policies:    • Allow 2-3 business days for refills; controlled substances may take longer.   • Contact your pharmacy at least 5 days prior to running out of medication and have them send an electronic request or submit request through the [facility] recommended that you have a procedure or additional testing performed. [facility]) will contact your insurance carrier to obtain pre-certification or prior authorization.     Unfortunately, [facility] has seen an increase in denial of paym

## 2018-02-05 NOTE — TELEPHONE ENCOUNTER
Notified pt that Rx for Gabapentin was sent to [facility]. Pt went to wound clinic today and Dr Laveda Gosselin. Pt is concerned because she has had several recent episodes of bleeding from her wound. Pt is worried and is requesting a referral to a vascular surge

## 2018-02-05 NOTE — PROGRESS NOTES
Subjective    Chief Complaint  This information was obtained from the patient  Patient is here for a follow up of RLE wound.      Allergies  adhesive tape    HPI  This information was obtained from the patient  The following HPI elements were documented for 2/5/18: Pt is here for follow up, has not had any more bleeding from wound until today's visit with dressing removal. Has not gotten santly yet, states it should arrive today.     Complaints and Symptoms  This information was obtained from the patient  [facility] BP WNL. Pulse RRR. RR within normal limits. Afebrile. Obesity BMI >30. Alert, calm, well developed, in no apparent distress.  Height/Length: 65 in (165.1 cm), Weight: 180 lbs (81.82 kgs), BMI: 30, Temperature: 98.6 °F (37 °C), Pulse: 91 bpm, Blood Pressure: Calf Measurement 36 cm from heel  Ankle Measurement 12 cm from heel  Right Extremity: Edema is present  Compression Device In Use: Yes  Device Used Correctly: Yes  Compression Device Used: Unna's or Duke Boot  Calf Measurement 36 cm from heel with right me Barrier ointment/paste/cream - around the wound  Santyl  Hydrofera ready  ABD pad  Kerlix  Change dressing 3x/week    Compression Therapy:  Coflex calamine unna boot  Avoid prolonged standing in one place. Elevate leg(s)as much as possible.      Additional

## 2018-02-05 NOTE — PROGRESS NOTES
Neurology Outpatient    Clarence Ham Patient Status:  No patient class for patient encounter    1950 MRN [id number]   Location [facility] Attending No att. providers found   [facility] Day #  PCP Ilsa Roberson MD     Subjective:  Arcelia Kelly Rfl: 0   aspirin 81 MG Oral Tab EC Take 1 tablet (81 mg total) by mouth daily. Disp: 30 tablet Rfl: 11   HYDROcodone-acetaminophen (NORCO) 5-325 MG Oral Tab Take 1 tablet by mouth every 8 (eight) hours as needed for Pain.  Disp: 60 tablet Rfl: 0   [START ON ANN MARIE PLUS) w/Device Does not apply Kit 1 Device by Other route 4 (four) times daily. Disp: 1 kit Rfl: 0   ACCU-CHEK FASTCLIX LANCETS Does not apply Misc 1 lancet by Finger stick route 4 (four) times daily. Use as directed.  Disp: 2 Box Rfl: 11   atorvastat artery disease ([facility]     PAD (peripheral artery disease) (HCC)     Hemiparesis affecting right side as late effect of stroke [facility])      PMHx:  Past Medical History:   Diagnosis Date   • Acute coronary syndrome ([facility] 3/31/2014    12/6/13   • Anxiety state, History:  Family History   Problem Relation Age of Onset   • Diabetes Father    • Heart Disease Father    • Heart Attack Father    • Cancer Sister 62     uterine   • Breast Cancer Maternal Grandmother 58   • Breast Cancer Paternal Grandmother 43   • Thyroi Inversion      5 5 Plantar Flexion 5 5   Interossei   Toe Extension     Infraspinatus   Toe spreading     Others   Others         SENSORY EXAMINATION:  UE: intact to light touch, pinprick decreased in a glove-like distribution  LE: intact to light touc evidence for acute infarct. No restricted diffusion. Extensive chronic white matter disease, may be in part related to posttreatment/chemotherapy effect and/or chronic ischemic small vessel disease. No evidence for intracranial metastatic   disease.  MRA ne men and 1 for women  - Follow  A mediterranean diet  - Abstain from tobacco products  - BP goals <140/90 optimally normotensive 120/80.  Use ACEI if possible  - LDL goal < [address], DO  Neurology

## 2018-02-07 ENCOUNTER — TELEPHONE (OUTPATIENT)
Dept: FAMILY MEDICINE CLINIC | Facility: CLINIC | Age: 68
End: 2018-02-07

## 2018-02-07 RX ORDER — MAGNESIUM HYDROXIDE 1200 MG/15ML
500 LIQUID ORAL 2 TIMES DAILY
Qty: 30 CONTAINER | Refills: 1 | Status: SHIPPED | OUTPATIENT
Start: 2018-02-07 | End: 2018-08-16 | Stop reason: ALTCHOICE

## 2018-02-07 NOTE — TELEPHONE ENCOUNTER
Pt is requesting an order for sodium chloride irrigation solution 0.9% that the wound clinic uses when they dress her wound. She states that the dressing sticks to the wound and the sodium chloride helps to reduce the sticking.     She said that she spoke w

## 2018-02-07 NOTE — TELEPHONE ENCOUNTER
Pt calling she is looking for sodium chloride irrigation solution 0.9% and she called ins and they will let her have it but she needs to get a Rx sent to her pharmacy so she can have some for home.

## 2018-02-08 ENCOUNTER — TELEPHONE (OUTPATIENT)
Dept: FAMILY MEDICINE CLINIC | Facility: CLINIC | Age: 68
End: 2018-02-08

## 2018-02-08 NOTE — TELEPHONE ENCOUNTER
Called talked to patient she has tablets so I asked her to take 1/2 of a tablet before bed time she will try this I also told her we might be closed tomorrow and would call her if we are.

## 2018-02-08 NOTE — TELEPHONE ENCOUNTER
Ok to hold. We can have her open capsule and take about 1/3 of the powder before bed and do this for 2 to 3 nights to see if she does better.  I am not sure if office will be open tomorrow with snowstorm prediction

## 2018-02-08 NOTE — TELEPHONE ENCOUNTER
Pt took her newly prescribed gabapentin 300 MG Oral Cap and she has been having adverse reactions. Her daughter told her she was slurring her speech and she was sweating a lot. Nothing else has changed so she is sure it is the medication.

## 2018-02-12 ENCOUNTER — OFFICE VISIT (OUTPATIENT)
Dept: WOUND CARE | Facility: HOSPITAL | Age: 68
End: 2018-02-12
Attending: NURSE PRACTITIONER
Payer: MEDICARE

## 2018-02-12 ENCOUNTER — OFFICE VISIT (OUTPATIENT)
Dept: FAMILY MEDICINE CLINIC | Facility: CLINIC | Age: 68
End: 2018-02-12

## 2018-02-12 DIAGNOSIS — I73.9 PERIPHERAL VASCULAR DISEASE, UNSPECIFIED (HCC): ICD-10-CM

## 2018-02-12 DIAGNOSIS — L97.929 CHRONIC VENOUS HYPERTENSION (IDIOPATHIC) WITH ULCER OF LEFT LOWER EXTREMITY (HCC): ICD-10-CM

## 2018-02-12 DIAGNOSIS — I87.312 CHRONIC VENOUS HYPERTENSION (IDIOPATHIC) WITH ULCER OF LEFT LOWER EXTREMITY (HCC): ICD-10-CM

## 2018-02-12 DIAGNOSIS — IMO0001 CHRONIC VENOUS HYPERTENSION WITH ULCER INVOLVING LEFT SIDE: Primary | ICD-10-CM

## 2018-02-12 DIAGNOSIS — Z79.4 UNCONTROLLED TYPE 2 DIABETES MELLITUS WITH INSULIN THERAPY (HCC): Primary | ICD-10-CM

## 2018-02-12 DIAGNOSIS — E11.65 UNCONTROLLED TYPE 2 DIABETES MELLITUS WITH INSULIN THERAPY (HCC): Primary | ICD-10-CM

## 2018-02-12 DIAGNOSIS — L97.322 CHRONIC ULCER OF LEFT ANKLE WITH FAT LAYER EXPOSED (HCC): ICD-10-CM

## 2018-02-12 DIAGNOSIS — L97.221 NON-PRESSURE CHRONIC ULCER OF LEFT CALF, LIMITED TO BREAKDOWN OF SKIN (HCC): ICD-10-CM

## 2018-02-12 PROCEDURE — 97598 DBRDMT OPN WND ADDL 20CM/<: CPT

## 2018-02-12 PROCEDURE — 29581 APPL MULTLAYER CMPRN SYS LEG: CPT

## 2018-02-12 PROCEDURE — 99213 OFFICE O/P EST LOW 20 MIN: CPT | Performed by: FAMILY MEDICINE

## 2018-02-12 PROCEDURE — 97597 DBRDMT OPN WND 1ST 20 CM/<: CPT

## 2018-02-12 NOTE — PROGRESS NOTES
Subjective    Chief Complaint  This information was obtained from the patient  Patient is here for a follow up of RLE wound.      Allergies  adhesive tape    HPI  This information was obtained from the patient  The following HPI elements were documented for 2/5/18: Pt is here for follow up, has not had any more bleeding from wound until today's visit with dressing removal. Has not gotten santly yet, states it should arrive today. 2/12/18: Pt is here for follow up. She has received her santyl.  She was star BP Elevated, on antihypertensive meds. . Pulse RRR. RR within normal limits. Afebrile. Obesity BMI >30. Alert, calm, well developed, in no apparent distress.  Height/Length: 65 in (165.1 cm), Weight: 180 lbs (81.82 kgs), BMI: 30, Temperature: 98.3 °F (36.83 Right Extremity: Edema is present  Compression Device In Use: Yes  Device Used Correctly: Yes  Compression Device Used: Unna's or Duke Boot  Calf Measurement 36 cm from heel with right measurement of 34.6 cm  Ankle Measurement 12 cm from heel with right me May shower with protection.  - home health: After removing the compression wrap: cleanse the limb, foot, between toes with soap and water and dry thoroughly with each dressing/wrap change  Cleanse with saline or wound cleanser  Antifungal cream/powder - curtis

## 2018-02-12 NOTE — PROGRESS NOTES
Patient presents with:  Wound: R leg- saw Wound Clinic today, dressing changed and was told swelling better      HPI:   This is a 78year old female coming in for wound, seeing wound clinic today, also now side effects on gabapenting, initially tried 300 m Constitutional: She is oriented to person, place, and time. She appears well-developed and well-nourished. No distress. HENT:   Head: Normocephalic. Neck: Normal range of motion. Neck supple.    Cardiovascular: Normal rate, regular rhythm and normal hea glimepiride 4 MG Oral Tab    MetFORMIN HCl 1000 MG Oral Tab    Insulin Glargine (TOUJEO SOLOSTAR) 300 UNIT/ML Subcutaneous Solution Pen-injector                      Musculoskeletal    Chronic ulcer [facility] ([facility]    Overview     Dx 8.2016 after hospital

## 2018-02-13 VITALS
DIASTOLIC BLOOD PRESSURE: 86 MMHG | RESPIRATION RATE: 20 BRPM | BODY MASS INDEX: 29.82 KG/M2 | WEIGHT: 179 LBS | HEART RATE: 88 BPM | TEMPERATURE: 97 F | HEIGHT: 65 IN | SYSTOLIC BLOOD PRESSURE: 138 MMHG

## 2018-02-13 NOTE — ASSESSMENT & PLAN NOTE
As for her Diabetes, it is borderline controlled. Recommendations are: continue present meds, lose weight by increased dietary compliance and exercise and will check labs as ordered.       Lab Results  Component Value Date   A1C 8.3 (A) 12/05/2017   A1C

## 2018-02-14 ENCOUNTER — PATIENT OUTREACH (OUTPATIENT)
Dept: CASE MANAGEMENT | Age: 68
End: 2018-02-14

## 2018-02-14 NOTE — PROGRESS NOTES
Attempted to contact pt no answer left detailed message for pt to call back.    Total time spent with patient including chart review: 2  Time spent with patient this month: 15    Total time spent with communication and chart review this month to date: 13

## 2018-02-19 ENCOUNTER — OFFICE VISIT (OUTPATIENT)
Dept: WOUND CARE | Facility: HOSPITAL | Age: 68
End: 2018-02-19
Attending: NURSE PRACTITIONER
Payer: MEDICARE

## 2018-02-19 DIAGNOSIS — I87.312 CHRONIC VENOUS HYPERTENSION (IDIOPATHIC) WITH ULCER OF LEFT LOWER EXTREMITY (HCC): ICD-10-CM

## 2018-02-19 DIAGNOSIS — I73.9 PERIPHERAL VASCULAR DISEASE, UNSPECIFIED (HCC): ICD-10-CM

## 2018-02-19 DIAGNOSIS — L97.929 CHRONIC VENOUS HYPERTENSION (IDIOPATHIC) WITH ULCER OF LEFT LOWER EXTREMITY (HCC): ICD-10-CM

## 2018-02-19 DIAGNOSIS — IMO0001 CHRONIC VENOUS HYPERTENSION WITH ULCER INVOLVING LEFT SIDE: Primary | ICD-10-CM

## 2018-02-19 PROCEDURE — 97598 DBRDMT OPN WND ADDL 20CM/<: CPT

## 2018-02-19 PROCEDURE — 29581 APPL MULTLAYER CMPRN SYS LEG: CPT

## 2018-02-19 PROCEDURE — 97597 DBRDMT OPN WND 1ST 20 CM/<: CPT

## 2018-02-19 NOTE — PROGRESS NOTES
Subjective    Chief Complaint  This information was obtained from the patient  Patient is here for a wound care follow up. Her pain is currently 5/10.      Allergies  adhesive tape    HPI  This information was obtained from the patient  The following HPI el 2/5/18: Pt is here for follow up, has not had any more bleeding from wound until today's visit with dressing removal. Has not gotten santly yet, states it should arrive today. 2/12/18: Pt is here for follow up. She has received her santyl.  She was star Medication reconciliation completed at today's visit. : Yes        Objective    Constitutional  BP Elevated, on antihypertensive meds and medications recently adjusted per pt. Pt denies HA, blurred vision, weakness or chest pain. Pulse RRR.  RR within barry Left Extremity: Edema is present   Compression Device In Use: No   Calf Measurement 36 cm from heel   Ankle Measurement 12 cm from heel  Right Extremity: Edema is present   Compression Device In Use: Yes   Device Used Correctly: Yes   Compression Device Us May shower with protection.  - home health: After removing the compression wrap: cleanse the limb, foot, between toes with soap and water and dry thoroughly with each dressing/wrap change  Cleanse with saline or wound cleanser  Barrier ointment/paste/cream Wound was cleansed with vashe this visit. Discussed with strong odor and increased drainage will start antibiotics. Patient is afebrile. Discussed Cipro may cause hypoglycemia with diabetic meds, pt is aware and will monitor.  Pt to stop Cipro if hypoglycem

## 2018-02-20 RX ORDER — ACETAMINOPHEN AND CODEINE PHOSPHATE 300; 30 MG/1; MG/1
TABLET ORAL
Qty: 30 TABLET | Refills: 0 | OUTPATIENT
Start: 2018-02-20 | End: 2019-10-16

## 2018-02-20 NOTE — TELEPHONE ENCOUNTER
Refill request sent from pharmacy for Tylenol # 3. LOV 2/12/18 Pt was given Norco by Dr Khushbu Pugh. Called and left message on home phone for Pt to call office. Need to verify  If Pt really requested this refill.

## 2018-02-20 NOTE — TELEPHONE ENCOUNTER
Refill phoned in as approved. Called and left message on home phone advising that one refill was approved, but that a appt will be needed prior to refill on this.

## 2018-02-20 NOTE — TELEPHONE ENCOUNTER
Patient states she did request this refill. She is trying to use Norco less. Would like refill of Tylenol #3.     Routed to Dr. Jerome Many

## 2018-02-21 ENCOUNTER — TELEPHONE (OUTPATIENT)
Dept: FAMILY MEDICINE CLINIC | Facility: CLINIC | Age: 68
End: 2018-02-21

## 2018-02-21 DIAGNOSIS — L97.929 SKIN ULCER OF LEFT LOWER LEG, UNSPECIFIED ULCER STAGE (HCC): ICD-10-CM

## 2018-02-21 DIAGNOSIS — IMO0001 CHRONIC VENOUS HYPERTENSION WITH ULCER INVOLVING LEFT SIDE: ICD-10-CM

## 2018-02-21 DIAGNOSIS — I73.9 PAD (PERIPHERAL ARTERY DISEASE) (HCC): Primary | ICD-10-CM

## 2018-02-21 NOTE — TELEPHONE ENCOUNTER
[facility] medical supply calling about a order for wound care foam dressing, gauze, boot and tape. They are looking for authoroization sent to [facility] so that they can get these supplies to the patient. They state they have faxed to us twice.   Asked them

## 2018-02-26 ENCOUNTER — HOSPITAL ENCOUNTER (OUTPATIENT)
Dept: MAMMOGRAPHY | Facility: HOSPITAL | Age: 68
Discharge: HOME | End: 2018-02-26
Attending: FAMILY MEDICINE
Payer: MEDICARE

## 2018-02-26 ENCOUNTER — OFFICE VISIT (OUTPATIENT)
Dept: WOUND CARE | Facility: HOSPITAL | Age: 68
End: 2018-02-26
Attending: NURSE PRACTITIONER
Payer: MEDICARE

## 2018-02-26 DIAGNOSIS — I87.312 CHRONIC VENOUS HYPERTENSION (IDIOPATHIC) WITH ULCER OF LEFT LOWER EXTREMITY (HCC): ICD-10-CM

## 2018-02-26 DIAGNOSIS — L97.929 CHRONIC VENOUS HYPERTENSION (IDIOPATHIC) WITH ULCER OF LEFT LOWER EXTREMITY (HCC): ICD-10-CM

## 2018-02-26 DIAGNOSIS — IMO0001 CHRONIC VENOUS HYPERTENSION WITH ULCER INVOLVING LEFT SIDE: Primary | ICD-10-CM

## 2018-02-26 DIAGNOSIS — Z12.31 VISIT FOR SCREENING MAMMOGRAM: ICD-10-CM

## 2018-02-26 DIAGNOSIS — I73.9 PERIPHERAL VASCULAR DISEASE, UNSPECIFIED (HCC): ICD-10-CM

## 2018-02-26 PROCEDURE — 29581 APPL MULTLAYER CMPRN SYS LEG: CPT

## 2018-02-26 PROCEDURE — 77067 SCR MAMMO BI INCL CAD: CPT | Performed by: FAMILY MEDICINE

## 2018-02-26 NOTE — PROGRESS NOTES
Subjective    Chief Complaint  This information was obtained from the patient  Patient is here for a wound care follow up of right leg wound.      Allergies  adhesive tape    HPI  This information was obtained from the patient  The following HPI elements we 2/5/18: Pt is here for follow up, has not had any more bleeding from wound until today's visit with dressing removal. Has not gotten santly yet, states it should arrive today. 2/12/18: Pt is here for follow up. She has received her santyl.  She was star Musculoskeletal: Muscle Weakness  Hematologic/Lymphatic: Bruising, Bleeding / Clotting Disorders    Additional Information  Medication reconciliation completed at today's visit. : Yes        Objective    Constitutional  BP WNL. Pulse RRR.  RR within normal Intact judgement and insight. Oriented to time, place and person.      Lower Extremity Assessment  Edema Assessment:  Left Extremity: Edema is present  Compression Device In Use: No  Calf Measurement 36 cm from heel  Ankle Measurement 12 cm from heel  Right Increase dietary protein - Focus on increasing protein in your diet or supplementing with Glucerna  Decrease salt intake  S/S of Infection  Non-adherence    Care summary  Discussed the Plan of Care at bedside with patient.  The patient verbally acknowledges

## 2018-02-27 ENCOUNTER — PATIENT OUTREACH (OUTPATIENT)
Dept: CASE MANAGEMENT | Age: 68
End: 2018-02-27

## 2018-02-27 DIAGNOSIS — F32.1 MODERATE SINGLE CURRENT EPISODE OF MAJOR DEPRESSIVE DISORDER (HCC): ICD-10-CM

## 2018-02-27 DIAGNOSIS — I10 HYPERTENSION GOAL BP (BLOOD PRESSURE) < 130/80: Chronic | ICD-10-CM

## 2018-02-27 DIAGNOSIS — Z79.4 UNCONTROLLED TYPE 2 DIABETES MELLITUS WITH INSULIN THERAPY (HCC): Chronic | ICD-10-CM

## 2018-02-27 DIAGNOSIS — E11.65 UNCONTROLLED TYPE 2 DIABETES MELLITUS WITH INSULIN THERAPY (HCC): Chronic | ICD-10-CM

## 2018-02-27 DIAGNOSIS — M81.6 LOCALIZED OSTEOPOROSIS WITHOUT CURRENT PATHOLOGICAL FRACTURE: ICD-10-CM

## 2018-02-27 DIAGNOSIS — E78.5 HYPERLIPIDEMIA LDL GOAL <100: Chronic | ICD-10-CM

## 2018-02-27 DIAGNOSIS — D32.9 BENIGN MENINGIOMA (HCC): ICD-10-CM

## 2018-02-27 PROCEDURE — 99490 CHRNC CARE MGMT STAFF 1ST 20: CPT

## 2018-02-27 NOTE — PROGRESS NOTES
2/27/2018  Spoke to [place] for [address]. Updates to patient care team/comments: yes, added per pt. Patient reported changes in medications: no changes. Med Adherence  Comment: pt reports taking all medications as prescribed.       Health Maintenance: [address] [facility], [facility] [address] [address], [address] [facility] [place] [facility] [place]  [address], [place] [address] [phone]    [facility]  [place] at [facility] Dr Phipps Ayush Baeza MD.

## 2018-02-28 PROBLEM — IMO0001 CHRONIC VENOUS HYPERTENSION WITH ULCER INVOLVING LEFT SIDE: Status: ACTIVE | Noted: 2017-02-21

## 2018-02-28 PROBLEM — L97.929 SKIN ULCER OF LEFT LOWER LEG (HCC): Status: ACTIVE | Noted: 2018-02-28

## 2018-02-28 NOTE — TELEPHONE ENCOUNTER
A supplies referral is being requested by [facility], however [facility] providers did not request medical supplies, The [address] [place] [address] is requesting supplies. A Referral is issued with the requested supplies to Dr Lev Perez.

## 2018-03-05 ENCOUNTER — HOSPITAL ENCOUNTER (OUTPATIENT)
Dept: ULTRASOUND IMAGING | Facility: HOSPITAL | Age: 68
Discharge: HOME | End: 2018-03-05
Attending: INTERNAL MEDICINE
Payer: MEDICARE

## 2018-03-05 ENCOUNTER — OFFICE VISIT (OUTPATIENT)
Dept: WOUND CARE | Facility: HOSPITAL | Age: 68
End: 2018-03-05
Attending: NURSE PRACTITIONER
Payer: MEDICARE

## 2018-03-05 DIAGNOSIS — E78.5 HYPERLIPIDEMIA LDL GOAL <100: Chronic | ICD-10-CM

## 2018-03-05 DIAGNOSIS — I25.10 CAD, MULTIPLE VESSEL: Chronic | ICD-10-CM

## 2018-03-05 DIAGNOSIS — Z79.4 UNCONTROLLED TYPE 2 DIABETES MELLITUS WITH INSULIN THERAPY (HCC): Chronic | ICD-10-CM

## 2018-03-05 DIAGNOSIS — Z23 NEED FOR PROPHYLACTIC VACCINATION AND INOCULATION AGAINST INFLUENZA: ICD-10-CM

## 2018-03-05 DIAGNOSIS — L97.929 CHRONIC VENOUS HYPERTENSION (IDIOPATHIC) WITH ULCER OF LEFT LOWER EXTREMITY (HCC): ICD-10-CM

## 2018-03-05 DIAGNOSIS — E11.319 DIABETIC RETINOPATHY OF BOTH EYES ASSOCIATED WITH TYPE 2 DIABETES MELLITUS, MACULAR EDEMA PRESENCE UNSPECIFIED, UNSPECIFIED RETINOPATHY SEVERITY (HCC): Chronic | ICD-10-CM

## 2018-03-05 DIAGNOSIS — C50.312 MALIGNANT NEOPLASM OF LOWER-INNER QUADRANT OF LEFT FEMALE BREAST, UNSPECIFIED ESTROGEN RECEPTOR STATUS (HCC): Chronic | ICD-10-CM

## 2018-03-05 DIAGNOSIS — I87.312 CHRONIC VENOUS HYPERTENSION (IDIOPATHIC) WITH ULCER OF LEFT LOWER EXTREMITY (HCC): ICD-10-CM

## 2018-03-05 DIAGNOSIS — I73.9 PERIPHERAL VASCULAR DISEASE, UNSPECIFIED (HCC): ICD-10-CM

## 2018-03-05 DIAGNOSIS — IMO0001 CHRONIC VENOUS HYPERTENSION WITH ULCER INVOLVING LEFT SIDE: Primary | ICD-10-CM

## 2018-03-05 DIAGNOSIS — E11.65 UNCONTROLLED TYPE 2 DIABETES MELLITUS WITH INSULIN THERAPY (HCC): Chronic | ICD-10-CM

## 2018-03-05 DIAGNOSIS — I10 HYPERTENSION GOAL BP (BLOOD PRESSURE) < 130/80: Chronic | ICD-10-CM

## 2018-03-05 PROCEDURE — 29581 APPL MULTLAYER CMPRN SYS LEG: CPT

## 2018-03-05 PROCEDURE — 76536 US EXAM OF HEAD AND NECK: CPT | Performed by: INTERNAL MEDICINE

## 2018-03-05 NOTE — PROGRESS NOTES
Subjective    Chief Complaint  This information was obtained from the patient, here for right leg wound follow up care.      Allergies  adhesive tape    HPI  This information was obtained from the patient  The following HPI elements were documented for the 2/5/18: Pt is here for follow up, has not had any more bleeding from wound until today's visit with dressing removal. Has not gotten santly yet, states it should arrive today. 2/12/18: Pt is here for follow up. She has received her santyl.  She was star Gastrointestinal (GI): Bowel Incontinence, Nausea / Vomiting / Diarrhea (N/V/D), Loss of Appetite, Difficulty Swallowing, Stomach/abdominal pain  Musculoskeletal: Muscle Weakness  Hematologic/Lymphatic: Bruising, Bleeding / Clotting Disorders        Object The periwound skin moisture is normal. The periwound skin exhibited: Edema, Hemosiderosis. The periwound skin did not exhibit: Brawny Induration, Erythema. The temperature of the periwound skin is WNL.  Periwound skin does not exhibit signs or symptoms of i Barrier ointment/paste/cream - around the wound  Silver Foam  ABD pad  Kerlix  Change dressing 3x/week    Compression Therapy:  Tubigrip - Single medium left leg  Coflex calamine unna boot  Compression Garment @ ______ mmHg - 30-40 mm Hg Nata 2 layer bi

## 2018-03-12 ENCOUNTER — OFFICE VISIT (OUTPATIENT)
Dept: WOUND CARE | Facility: HOSPITAL | Age: 68
End: 2018-03-12
Attending: NURSE PRACTITIONER
Payer: MEDICARE

## 2018-03-12 DIAGNOSIS — I87.312 CHRONIC VENOUS HYPERTENSION (IDIOPATHIC) WITH ULCER OF LEFT LOWER EXTREMITY (HCC): ICD-10-CM

## 2018-03-12 DIAGNOSIS — L97.929 CHRONIC VENOUS HYPERTENSION (IDIOPATHIC) WITH ULCER OF LEFT LOWER EXTREMITY (HCC): ICD-10-CM

## 2018-03-12 DIAGNOSIS — I73.9 PERIPHERAL VASCULAR DISEASE, UNSPECIFIED (HCC): ICD-10-CM

## 2018-03-12 DIAGNOSIS — IMO0001 CHRONIC VENOUS HYPERTENSION WITH ULCER INVOLVING LEFT SIDE: Primary | ICD-10-CM

## 2018-03-12 PROCEDURE — 29581 APPL MULTLAYER CMPRN SYS LEG: CPT

## 2018-03-12 NOTE — PROGRESS NOTES
Patient informed of Dr. Andrzej Valverde result note. Patient verbalized understanding and agrees with plan.

## 2018-03-12 NOTE — PROGRESS NOTES
Subjective    Chief Complaint  This information was obtained from the patient  right leg ulcer follow up.     Allergies  adhesive tape    HPI  This information was obtained from the patient  The following HPI elements were documented for the patient's wound 2/5/18: Pt is here for follow up, has not had any more bleeding from wound until today's visit with dressing removal. Has not gotten santly yet, states it should arrive today. 2/12/18: Pt is here for follow up. She has received her santyl.  She was star Respiratory: Cough, Shortness of Breath, Wheezing  Cardiovascular (Central/Peripheral): Chest Pain, Dyspnea on Exertion, Intermittent Claudication, Lower extremity (leg) resting pain  Gastrointestinal (GI): Bowel Incontinence, Nausea / Vomiting / Diarrhea The periwound skin exhibited: Edema, Dry/Scaly, Hemosiderosis. The periwound skin did not exhibit: Erythema. The temperature of the periwound skin is WNL. Periwound skin does not exhibit signs or symptoms of infection. Local Pulse is Weak.     Psychiatric: coflex unna boot        Plan    Wound Orders:  Wound #3 Right, Lateral, Posterior Lower Leg     Topicals:  Initial Anesthetic Order: Apply lidocaine to wound bed on all future wound center visits during preparation for physician exam if wound bed contains

## 2018-03-19 ENCOUNTER — OFFICE VISIT (OUTPATIENT)
Dept: WOUND CARE | Facility: HOSPITAL | Age: 68
End: 2018-03-19
Attending: NURSE PRACTITIONER
Payer: MEDICARE

## 2018-03-19 DIAGNOSIS — IMO0001 CHRONIC VENOUS HYPERTENSION WITH ULCER INVOLVING LEFT SIDE: Primary | ICD-10-CM

## 2018-03-19 DIAGNOSIS — L97.929 CHRONIC VENOUS HYPERTENSION (IDIOPATHIC) WITH ULCER OF LEFT LOWER EXTREMITY (HCC): ICD-10-CM

## 2018-03-19 DIAGNOSIS — I73.9 PERIPHERAL VASCULAR DISEASE, UNSPECIFIED (HCC): ICD-10-CM

## 2018-03-19 DIAGNOSIS — I87.312 CHRONIC VENOUS HYPERTENSION (IDIOPATHIC) WITH ULCER OF LEFT LOWER EXTREMITY (HCC): ICD-10-CM

## 2018-03-19 PROCEDURE — 29581 APPL MULTLAYER CMPRN SYS LEG: CPT

## 2018-03-19 NOTE — PROGRESS NOTES
Subjective    Chief Complaint  This information was obtained from the patient  Patient here for wound care f/u of right lower leg. Patient reports that she will be following up with home health nurse once a week. Wrap still intact.      Allergies  adhesive 1/29/18: Pt returns for follow up, has been taking Cipro for wound infection and tolerating well. She was in the ER on Saturday because wound was bleeding heavily and home health RN was unable to get it to stop.  Since then states she has had some bleeding Genitourinary (): Urinary Incontinence  Musculoskeletal: Assistive Devices (cane)  Integumentary (Hair/Skin/Nails): Ulcers (right posterior lower leg), Prone to Skin Tears (Plavix ), Dryness (generalized), Hemosiderin Staining (BLE)  Neurological: Loss o Wound #3 Right, Lateral, Posterior Lower Leg is a chronic Full Thickness Venous Ulcer and has received a status of improved.  Subsequent wound encounter measurements are 4.5cm length x 8cm width x 0.1cm depth, with an area of 36 sq cm and a volume of 3.6 cu Right Extremity: Edema is present  Compression Device In Use: Yes  Device Used Correctly: Yes  Compression Device Used: Unna's or Duke Boot  Calf Measurement 36 cm from heel with right measurement of 33.2 cm  Ankle Measurement 12 cm from heel with right me Initial Anesthetic Order: Apply lidocaine to wound bed on all future wound center visits during preparation for physician exam if wound bed contains fibrin or eschar. 4% Topical Lidocaine    Wound Cleansing & Dressings  May shower with protection.  - home

## 2018-03-26 ENCOUNTER — OFFICE VISIT (OUTPATIENT)
Dept: WOUND CARE | Facility: HOSPITAL | Age: 68
End: 2018-03-26
Attending: NURSE PRACTITIONER
Payer: MEDICARE

## 2018-03-26 ENCOUNTER — APPOINTMENT (OUTPATIENT)
Dept: LAB | Facility: HOSPITAL | Age: 68
End: 2018-03-26
Attending: INTERNAL MEDICINE
Payer: MEDICARE

## 2018-03-26 DIAGNOSIS — I25.10 CAD, MULTIPLE VESSEL: Chronic | ICD-10-CM

## 2018-03-26 DIAGNOSIS — E78.5 HYPERLIPIDEMIA LDL GOAL <100: Chronic | ICD-10-CM

## 2018-03-26 DIAGNOSIS — Z11.59 NEED FOR HEPATITIS C SCREENING TEST: ICD-10-CM

## 2018-03-26 DIAGNOSIS — IMO0001 CHRONIC VENOUS HYPERTENSION WITH ULCER INVOLVING LEFT SIDE: Primary | ICD-10-CM

## 2018-03-26 DIAGNOSIS — I87.312 CHRONIC VENOUS HYPERTENSION (IDIOPATHIC) WITH ULCER OF LEFT LOWER EXTREMITY (HCC): ICD-10-CM

## 2018-03-26 DIAGNOSIS — L97.929 CHRONIC VENOUS HYPERTENSION (IDIOPATHIC) WITH ULCER OF LEFT LOWER EXTREMITY (HCC): ICD-10-CM

## 2018-03-26 DIAGNOSIS — I10 HYPERTENSION GOAL BP (BLOOD PRESSURE) < 130/80: Chronic | ICD-10-CM

## 2018-03-26 DIAGNOSIS — I73.9 PERIPHERAL VASCULAR DISEASE, UNSPECIFIED (HCC): ICD-10-CM

## 2018-03-26 LAB
ALBUMIN SERPL-MCNC: 3.5 G/DL (ref 3.5–4.8)
ALP LIVER SERPL-CCNC: 71 U/L (ref 55–142)
ALT SERPL-CCNC: 21 U/L (ref 14–54)
AST SERPL-CCNC: 12 U/L (ref 15–41)
BILIRUB SERPL-MCNC: 0.9 MG/DL (ref 0.1–2)
BUN BLD-MCNC: 16 MG/DL (ref 8–20)
CALCIUM BLD-MCNC: 8.8 MG/DL (ref 8.3–10.3)
CHLORIDE: 105 MMOL/L (ref 101–111)
CHOLEST SMN-MCNC: 140 MG/DL (ref ?–200)
CO2: 28 MMOL/L (ref 22–32)
CREAT BLD-MCNC: 0.61 MG/DL (ref 0.55–1.02)
GLUCOSE BLD-MCNC: 133 MG/DL (ref 70–99)
HDLC SERPL-MCNC: 61 MG/DL (ref 45–?)
HDLC SERPL: 2.3 {RATIO} (ref ?–4.44)
HEPATITIS C VIRUS AB INTERPRETATION: NONREACTIVE
LDLC SERPL CALC-MCNC: 64 MG/DL (ref ?–130)
M PROTEIN MFR SERPL ELPH: 7.7 G/DL (ref 6.1–8.3)
NONHDLC SERPL-MCNC: 79 MG/DL (ref ?–130)
POTASSIUM SERPL-SCNC: 3.9 MMOL/L (ref 3.6–5.1)
SODIUM SERPL-SCNC: 138 MMOL/L (ref 136–144)
TRIGL SERPL-MCNC: 75 MG/DL (ref ?–150)
VLDLC SERPL CALC-MCNC: 15 MG/DL (ref 5–40)

## 2018-03-26 PROCEDURE — 29581 APPL MULTLAYER CMPRN SYS LEG: CPT

## 2018-03-26 PROCEDURE — 80053 COMPREHEN METABOLIC PANEL: CPT

## 2018-03-26 PROCEDURE — 36415 COLL VENOUS BLD VENIPUNCTURE: CPT

## 2018-03-26 PROCEDURE — 86803 HEPATITIS C AB TEST: CPT

## 2018-03-26 PROCEDURE — 97597 DBRDMT OPN WND 1ST 20 CM/<: CPT

## 2018-03-26 PROCEDURE — 80061 LIPID PANEL: CPT

## 2018-03-26 NOTE — PROGRESS NOTES
Subjective    Chief Complaint  This information was obtained from the patient  Patient here for wound care f/u of right lower leg.      Allergies  adhesive tape    HPI  This information was obtained from the patient  The following HPI elements were document 2/5/18: Pt is here for follow up, has not had any more bleeding from wound until today's visit with dressing removal. Has not gotten santly yet, states it should arrive today. 2/12/18: Pt is here for follow up. She has received her santyl.  She was star Integumentary (Hair/Skin/Nails): Ulcers (right posterior lower leg), Prone to Skin Tears (Plavix ), Dryness (generalized), Hemosiderin Staining (BLE)  Neurological: Loss of Protective Sensation (Pt stated has neuropathy), Headaches  Psychiatric: Depression Wound #3 Right, Lateral, Posterior Lower Leg is a chronic Full Thickness Venous Ulcer and has received a status of improved. Subsequent wound encounter measurements are 4.3cm length x 6.3cm width with no measurable depth, with an area of 27.09 sq cm .  No t Ankle Measurement 12 cm from heel with left measurement of 30 cm  Right Extremity: Edema is present  Compression Device In Use: Yes  Device Used Correctly: Yes  Compression Device Used: Unna's or Duke Boot  Calf Measurement 36 cm from heel with right measu ABD pad  Kerlix  Change dressing every: - 2x a week    Compression Therapy:  Tubigrip - Single medium left leg  Coflex calamine unna boot  Compression Garment @ ______ mmHg - 30-40 mm Hg Nata 2 layer bilateral lower legs  Avoid prolonged standing in one

## 2018-03-27 ENCOUNTER — PATIENT OUTREACH (OUTPATIENT)
Dept: CASE MANAGEMENT | Age: 68
End: 2018-03-27

## 2018-04-02 ENCOUNTER — OFFICE VISIT (OUTPATIENT)
Dept: WOUND CARE | Facility: HOSPITAL | Age: 68
End: 2018-04-02
Attending: NURSE PRACTITIONER
Payer: MEDICARE

## 2018-04-02 ENCOUNTER — HOSPITAL ENCOUNTER (OUTPATIENT)
Dept: CT IMAGING | Facility: HOSPITAL | Age: 68
Discharge: HOME | End: 2018-04-02
Attending: INTERNAL MEDICINE
Payer: MEDICARE

## 2018-04-02 DIAGNOSIS — L97.309: ICD-10-CM

## 2018-04-02 DIAGNOSIS — I73.9 PERIPHERAL VASCULAR DISEASE, UNSPECIFIED (HCC): ICD-10-CM

## 2018-04-02 DIAGNOSIS — I87.312 CHRONIC VENOUS HYPERTENSION (IDIOPATHIC) WITH ULCER OF LEFT LOWER EXTREMITY (HCC): ICD-10-CM

## 2018-04-02 DIAGNOSIS — IMO0001 CHRONIC VENOUS HYPERTENSION WITH ULCER INVOLVING LEFT SIDE: Primary | ICD-10-CM

## 2018-04-02 DIAGNOSIS — L97.929 CHRONIC VENOUS HYPERTENSION (IDIOPATHIC) WITH ULCER OF LEFT LOWER EXTREMITY (HCC): ICD-10-CM

## 2018-04-02 PROCEDURE — 75635 CT ANGIO ABDOMINAL ARTERIES: CPT | Performed by: INTERNAL MEDICINE

## 2018-04-02 PROCEDURE — 29581 APPL MULTLAYER CMPRN SYS LEG: CPT

## 2018-04-02 NOTE — PROGRESS NOTES
Subjective    Chief Complaint  This information was obtained from the patient  Patient here for wound care f/u of right lower leg. Patient reports that her last day for having home health will be this Thursday.  4/5/2018    Allergies  adhesive tape    HPI 1/29/18: Pt returns for follow up, has been taking Cipro for wound infection and tolerating well. She was in the ER on Saturday because wound was bleeding heavily and home health RN was unable to get it to stop.  Since then states she has had some bleeding This information was obtained from the patient  Patient complains of:   Respiratory: Other (has sleep apnea but currently using cpap \"it was taken away by medicare\")  Cardiovascular (Central/Peripheral):  Lower extremity (leg) swelling  Genitourinary () Integumentary (Hair, Skin)  Warm, dry, well perfused. Firm wound bed, no subcutaneous nodules, induration or crepitus. Wound #3 Right, Lateral, Posterior Lower Leg is a chronic Full Thickness Venous Ulcer and has received a status of Bridged.  [address] Wound #3 (Venous Ulcer) is located on the right, lateral, posterior lower leg. A Multi-Layer Compression Wrap procedure was performed. A 2 Layers Unna Boot was applied with moderate 15-25 mmhg. The procedure was tolerated well.    General Notes:  coflex unn Last A1C date and value: - 12/5/17: 8.3  Last albumin date and value: - 1/27/18: 3.0 (lower than last year)    Pain Assessment Follow-up  Pain Assessment follow-up plan for positive pain.  - Per primary care for management of her opioids and neurology for n

## 2018-04-08 ENCOUNTER — OFFICE VISIT (OUTPATIENT)
Dept: SLEEP CENTER | Facility: HOSPITAL | Age: 68
End: 2018-04-08
Attending: NURSE PRACTITIONER
Payer: MEDICARE

## 2018-04-08 DIAGNOSIS — G47.33 OSA (OBSTRUCTIVE SLEEP APNEA): ICD-10-CM

## 2018-04-08 PROCEDURE — 95811 POLYSOM 6/>YRS CPAP 4/> PARM: CPT

## 2018-04-09 ENCOUNTER — OFFICE VISIT (OUTPATIENT)
Dept: WOUND CARE | Facility: HOSPITAL | Age: 68
End: 2018-04-09
Attending: NURSE PRACTITIONER
Payer: MEDICARE

## 2018-04-09 DIAGNOSIS — I87.312 CHRONIC VENOUS HYPERTENSION (IDIOPATHIC) WITH ULCER OF LEFT LOWER EXTREMITY (HCC): ICD-10-CM

## 2018-04-09 DIAGNOSIS — IMO0001 CHRONIC VENOUS HYPERTENSION WITH ULCER INVOLVING LEFT SIDE: Primary | ICD-10-CM

## 2018-04-09 DIAGNOSIS — L97.929 CHRONIC VENOUS HYPERTENSION (IDIOPATHIC) WITH ULCER OF LEFT LOWER EXTREMITY (HCC): ICD-10-CM

## 2018-04-09 DIAGNOSIS — I73.9 PERIPHERAL VASCULAR DISEASE, UNSPECIFIED (HCC): ICD-10-CM

## 2018-04-09 PROCEDURE — 29581 APPL MULTLAYER CMPRN SYS LEG: CPT

## 2018-04-11 RX ORDER — GABAPENTIN 300 MG/1
CAPSULE ORAL
COMMUNITY
Start: 2018-02-03 | End: 2018-08-16

## 2018-04-11 NOTE — PROCEDURES
[address] [place] [address],[facility] [address]       Accredited by the [place] of Sleep Medicine (AASM)    PATIENT'S NAME:        [facility] PHYSICIAN:   Claudia Mckeon M.D.   REFERRING PHYSICIAN:     PATIENT ACCOUNT # of [id number].  No positional variation could be determined. The oxygen jaime was 72%. The patient spent only 69% of the record with a saturation of greater than 90%. Because of the severity of sleep-disorder breathing, CPAP titration was initiated.   CPAP was

## 2018-04-13 ENCOUNTER — OFFICE VISIT (OUTPATIENT)
Dept: FAMILY MEDICINE CLINIC | Facility: CLINIC | Age: 68
End: 2018-04-13

## 2018-04-13 VITALS
DIASTOLIC BLOOD PRESSURE: 78 MMHG | RESPIRATION RATE: 16 BRPM | WEIGHT: 183 LBS | BODY MASS INDEX: 30.86 KG/M2 | TEMPERATURE: 97 F | HEIGHT: 64.5 IN | SYSTOLIC BLOOD PRESSURE: 124 MMHG | HEART RATE: 78 BPM

## 2018-04-13 DIAGNOSIS — Z79.4 TYPE 2 DIABETES MELLITUS WITH INSULIN THERAPY (HCC): Primary | ICD-10-CM

## 2018-04-13 DIAGNOSIS — E78.5 HYPERLIPIDEMIA LDL GOAL <100: Chronic | ICD-10-CM

## 2018-04-13 DIAGNOSIS — E11.319 DIABETIC RETINOPATHY OF BOTH EYES ASSOCIATED WITH TYPE 2 DIABETES MELLITUS, MACULAR EDEMA PRESENCE UNSPECIFIED, UNSPECIFIED RETINOPATHY SEVERITY (HCC): Chronic | ICD-10-CM

## 2018-04-13 DIAGNOSIS — E11.9 TYPE 2 DIABETES MELLITUS WITH INSULIN THERAPY (HCC): Primary | ICD-10-CM

## 2018-04-13 DIAGNOSIS — I70.0 ATHEROSCLEROSIS OF AORTA (HCC): Chronic | ICD-10-CM

## 2018-04-13 DIAGNOSIS — I10 HYPERTENSION GOAL BP (BLOOD PRESSURE) < 130/80: Chronic | ICD-10-CM

## 2018-04-13 PROCEDURE — 99214 OFFICE O/P EST MOD 30 MIN: CPT | Performed by: FAMILY MEDICINE

## 2018-04-13 NOTE — PROGRESS NOTES
HPI:   Huseyin Da Silva is a 78year old female who presents for recheck of her diabetes. PVD with recent CT shoinw popliteal and femoral artery disease.      Patient presents with:  Diabetes: 2 month f/u   Complete Form: Pt needs placard form filled o 03/26/2018 09:31 AM   CHOLEST 140 03/26/2018 09:31 AM   TRIG 75 03/26/2018 09:31 AM   VLDL 15 03/26/2018 09:31 AM   T4F 0.9 09/19/2017 08:38 AM   TSH 0.701 09/19/2017 08:38 AM   BUN 16 03/26/2018 09:31 AM   CREATSERUM 0.61 03/26/2018 09:31 AM   GFR 91 01/2 total) by mouth daily. amoxicillin 500 MG Oral Tab    Ciprofloxacin HCl 500 MG Oral Tab    SANTYL 250 UNIT/GM External Ointment    Pioglitazone HCl 15 MG Oral Tab Take 1 tablet (15 mg total) by mouth daily.    ACETAMINOPHEN-CODEINE #3 300-30 MG Oral Tab T differently: Inject 10 Units into the skin every evening. Only if BS > 200 )   Multiple Vitamin (ONE-DAILY MULTI VITAMINS) Oral Tab Take 1 tablet by mouth daily. aspirin 81 MG Oral Tab EC Take 1 tablet (81 mg total) by mouth daily.      No current facilit noted. Recommendations are: continue present meds, lose weight by increased dietary compliance and exercise, see ophthalmology soon, check feet daily and will check labs as ordered.      As for her hypertension, Blood Pressure is well controlled, no sig , novolog, stopped glimiperide, needs closer follow up so referred to [facility] 5/03/9605   Complications of retinopathy, PVD and fluctuating BS           Current Assessment & Plan     As for her Diabetes, it is reasonably well controlled, no significant medi

## 2018-04-13 NOTE — ASSESSMENT & PLAN NOTE
As for her Diabetes, it is reasonably well controlled, no significant medication side effects noted.  Some hyperglycemia noted but much better    Recommendations are: continue present meds, lose weight by increased dietary compliance and exercise and will c

## 2018-04-16 ENCOUNTER — OFFICE VISIT (OUTPATIENT)
Dept: WOUND CARE | Facility: HOSPITAL | Age: 68
End: 2018-04-16
Attending: NURSE PRACTITIONER
Payer: MEDICARE

## 2018-04-16 DIAGNOSIS — L97.929 CHRONIC VENOUS HYPERTENSION (IDIOPATHIC) WITH ULCER OF LEFT LOWER EXTREMITY (HCC): ICD-10-CM

## 2018-04-16 DIAGNOSIS — IMO0001 CHRONIC VENOUS HYPERTENSION WITH ULCER INVOLVING LEFT SIDE: Primary | ICD-10-CM

## 2018-04-16 DIAGNOSIS — I73.9 PERIPHERAL VASCULAR DISEASE, UNSPECIFIED (HCC): ICD-10-CM

## 2018-04-16 DIAGNOSIS — I87.312 CHRONIC VENOUS HYPERTENSION (IDIOPATHIC) WITH ULCER OF LEFT LOWER EXTREMITY (HCC): ICD-10-CM

## 2018-04-16 PROCEDURE — 29581 APPL MULTLAYER CMPRN SYS LEG: CPT

## 2018-04-16 NOTE — PROGRESS NOTES
Subjective    Chief Complaint  This information was obtained from the patient  Patient here for wound care follow up of right leg wound.     Allergies  adhesive tape    HPI  This information was obtained from the patient  The following HPI elements were doc 2/5/18: Pt is here for follow up, has not had any more bleeding from wound until today's visit with dressing removal. Has not gotten santly yet, states it should arrive today. 2/12/18: Pt is here for follow up. She has received her santyl.  She was star 4/16/18: Pt here for follow up, wound continues to heal well. Pt has not ordered her compression stockings yet as the company asked her if she would like to wait until wounds are healed to order. Pt was instructed to order stockings now.  She also states sh BP Elevated, on antihypertensive meds. . Pulse RRR. RR within normal limits. Afebrile. Obesity BMI >30. Alert, calm, well developed, in no apparent distress.  Height/Length: 65 in (165.1 cm), Weight: 180 lbs (81.82 kgs), BMI: 30, Temperature: 97.2 °F (36.22 Compression Device Used: Unna's or Duke Boot   Calf Measurement 36 cm from heel with right measurement of 34.4 cm   Ankle Measurement 12 cm from heel with right measurement of 25.8 cm  Vascular Assessment:  Right Extremity Pulses:   Dorsalis Pedis: Palpabl Return Appointment in 1 week. Misc/Additional Orders  Home health care nurse for wound care.  - Once a week, final visit this thursday  Increase dietary protein - Focus on increasing protein in your diet or supplementing with Glucerna  Decrease salt [place]

## 2018-04-18 ENCOUNTER — PATIENT OUTREACH (OUTPATIENT)
Dept: CASE MANAGEMENT | Age: 68
End: 2018-04-18

## 2018-04-18 DIAGNOSIS — E78.5 HYPERLIPIDEMIA LDL GOAL <100: Chronic | ICD-10-CM

## 2018-04-18 DIAGNOSIS — M81.6 LOCALIZED OSTEOPOROSIS WITHOUT CURRENT PATHOLOGICAL FRACTURE: ICD-10-CM

## 2018-04-18 DIAGNOSIS — E11.65 UNCONTROLLED TYPE 2 DIABETES MELLITUS WITH INSULIN THERAPY (HCC): Chronic | ICD-10-CM

## 2018-04-18 DIAGNOSIS — I77.9 BILATERAL CAROTID ARTERY DISEASE (HCC): ICD-10-CM

## 2018-04-18 DIAGNOSIS — I10 HYPERTENSION GOAL BP (BLOOD PRESSURE) < 130/80: Chronic | ICD-10-CM

## 2018-04-18 DIAGNOSIS — F32.1 MODERATE SINGLE CURRENT EPISODE OF MAJOR DEPRESSIVE DISORDER (HCC): ICD-10-CM

## 2018-04-18 DIAGNOSIS — Z79.4 UNCONTROLLED TYPE 2 DIABETES MELLITUS WITH INSULIN THERAPY (HCC): Chronic | ICD-10-CM

## 2018-04-18 PROCEDURE — 99490 CHRNC CARE MGMT STAFF 1ST 20: CPT

## 2018-04-18 NOTE — PROGRESS NOTES
4/18/2018  Spoke to Dragan Ramirez for CCM. Updates to patient care team/comments: yes. Patient reported changes in medications: no changes. Med Adherence  Comment: pt reports taking all medications as prescribed. Health Maintenance:    Your appoint [address] at [place] [address].  [employer] [address] [facility] [place] [facility] at [address] [place] [address]

## 2018-04-23 ENCOUNTER — OFFICE VISIT (OUTPATIENT)
Dept: WOUND CARE | Facility: HOSPITAL | Age: 68
End: 2018-04-23
Attending: NURSE PRACTITIONER
Payer: MEDICARE

## 2018-04-23 DIAGNOSIS — IMO0001 CHRONIC VENOUS HYPERTENSION WITH ULCER INVOLVING LEFT SIDE: Primary | ICD-10-CM

## 2018-04-23 DIAGNOSIS — L97.929 CHRONIC VENOUS HYPERTENSION (IDIOPATHIC) WITH ULCER OF LEFT LOWER EXTREMITY (HCC): ICD-10-CM

## 2018-04-23 DIAGNOSIS — I87.312 CHRONIC VENOUS HYPERTENSION (IDIOPATHIC) WITH ULCER OF LEFT LOWER EXTREMITY (HCC): ICD-10-CM

## 2018-04-23 DIAGNOSIS — I73.9 PERIPHERAL VASCULAR DISEASE, UNSPECIFIED (HCC): ICD-10-CM

## 2018-04-23 PROCEDURE — 29581 APPL MULTLAYER CMPRN SYS LEG: CPT

## 2018-04-23 NOTE — PROGRESS NOTES
Subjective    Chief Complaint  This information was obtained from the patient  Patient here for wound care follow up of right leg wound.     Allergies  adhesive tape    HPI  This information was obtained from the patient  The following HPI elements were doc 2/5/18: Pt is here for follow up, has not had any more bleeding from wound until today's visit with dressing removal. Has not gotten santly yet, states it should arrive today. 2/12/18: Pt is here for follow up. She has received her santyl.  She was star 4/16/18: Pt here for follow up, wound continues to heal well. Pt has not ordered her compression stockings yet as the company asked her if she would like to wait until wounds are healed to order. Pt was instructed to order stockings now.  She also states sh BP WNL. Pulse RRR. RR within normal limits. Afebrile. Obesity BMI >30. Alert, calm, well developed, in no apparent distress.  Height/Length: 65 in (165.1 cm), Weight: 180 lbs (81.82 kgs), BMI: 30, Temperature: 98.2 °F (36.78 °C), Pulse: 85 bpm, Respiratory Device Used Correctly: Yes   Compression Device Used: Unna's or Duke Boot   Calf Measurement 36 cm from heel with right measurement of 34 cm   Ankle Measurement 12 cm from heel with right measurement of 24.5 cm  Vascular Assessment:  Left Extremity Pulses: Compression Garment @ ______ mmHg - 30-40 mm Hg [place]on 2 layer bilateral lower legs, pt to order  Avoid prolonged standing in one place. Elevate leg(s)as much as possible. Additional Orders: Follow-Up Appointments  Return Appointment in 1 week.

## 2018-04-30 ENCOUNTER — OFFICE VISIT (OUTPATIENT)
Dept: WOUND CARE | Facility: HOSPITAL | Age: 68
End: 2018-04-30
Attending: NURSE PRACTITIONER
Payer: MEDICARE

## 2018-04-30 DIAGNOSIS — IMO0001 CHRONIC VENOUS HYPERTENSION WITH ULCER INVOLVING LEFT SIDE: Primary | ICD-10-CM

## 2018-04-30 DIAGNOSIS — I73.9 PERIPHERAL VASCULAR DISEASE, UNSPECIFIED (HCC): ICD-10-CM

## 2018-04-30 PROCEDURE — 99213 OFFICE O/P EST LOW 20 MIN: CPT

## 2018-04-30 NOTE — PROGRESS NOTES
Subjective    Chief Complaint  This information was obtained from the patient  Patient here for wound care follow up of right leg wound.     Allergies  adhesive tape    HPI  This information was obtained from the patient  The following HPI elements were doc 2/5/18: Pt is here for follow up, has not had any more bleeding from wound until today's visit with dressing removal. Has not gotten santly yet, states it should arrive today. 2/12/18: Pt is here for follow up. She has received her santyl.  She was star 4/16/18: Pt here for follow up, wound continues to heal well. Pt has not ordered her compression stockings yet as the company asked her if she would like to wait until wounds are healed to order. Pt was instructed to order stockings now.  She also states sh BP Elevated, on antihypertensive meds. Patient to monitor BP at home today and if it does not come down she is to notify PCP. . Pulse RRR. RR within normal limits. Afebrile. Within Ideal body weight range.  Alert, calm, well developed, in no apparent distre Right Extremity colors, hair growth, and conditions:   Extremity Color: Pigmented   Hair Growth on Extremity: No   Temperature of Extremity: Warm   Capillary Refill: < 3 seconds   Erythema: No   Dependent Rubor: No   Hyperpigmentation: Yes   Lipodermatoscl Pt has done well and wounds are healed. Discussed with patient once she transitions into compression if she notices any additional edema or blisters or drainage from leg to notify us right away.  Pt to continue other edema reducing measures as well such as

## 2018-05-03 ENCOUNTER — OFFICE VISIT (OUTPATIENT)
Dept: WOUND CARE | Facility: HOSPITAL | Age: 68
End: 2018-05-03
Attending: NURSE PRACTITIONER
Payer: MEDICARE

## 2018-05-03 DIAGNOSIS — I73.9 PERIPHERAL VASCULAR DISEASE, UNSPECIFIED (HCC): ICD-10-CM

## 2018-05-03 DIAGNOSIS — I87.312 CHRONIC VENOUS HYPERTENSION (IDIOPATHIC) WITH ULCER OF LEFT LOWER EXTREMITY (HCC): ICD-10-CM

## 2018-05-03 DIAGNOSIS — L97.929 CHRONIC VENOUS HYPERTENSION (IDIOPATHIC) WITH ULCER OF LEFT LOWER EXTREMITY (HCC): ICD-10-CM

## 2018-05-03 DIAGNOSIS — IMO0001 CHRONIC VENOUS HYPERTENSION WITH ULCER INVOLVING LEFT SIDE: Primary | ICD-10-CM

## 2018-05-03 PROCEDURE — 29581 APPL MULTLAYER CMPRN SYS LEG: CPT

## 2018-05-03 NOTE — PROGRESS NOTES
Subjective    Chief Complaint  This information was obtained from the patient  Patient here for wound care follow up of right leg wound.     Allergies  adhesive tape    HPI  This information was obtained from the patient  The following HPI elements were doc 2/5/18: Pt is here for follow up, has not had any more bleeding from wound until today's visit with dressing removal. Has not gotten santly yet, states it should arrive today. 2/12/18: Pt is here for follow up. She has received her santyl.  She was star 4/16/18: Pt here for follow up, wound continues to heal well. Pt has not ordered her compression stockings yet as the company asked her if she would like to wait until wounds are healed to order. Pt was instructed to order stockings now.  She also states sh Musculoskeletal: Muscle Weakness  Integumentary (Hair/Skin/Nails): Ulcers (resolved)  Hematologic/Lymphatic: Bruising, Bleeding / Clotting Disorders    Additional Information  Medication reconciliation completed at today's visit. : Yes        Objective Left Extremity: Edema is present  Compression Device In Use: Yes  Device Used Correctly: Yes  Compression Device Used: Tubigrip or Tubifast  Calf Measurement 36 cm from heel with left measurement of 35.8 cm  Ankle Measurement 12 cm from heel with left naz RN visit for assessment of wound(s).  - Monday    Misc/Additional Orders  Lise 51  Increase dietary protein - Focus on increasing protein in your diet or supplementing with Glucerna  Decrease salt intake  S/S of Infection  Non-adherence    C

## 2018-05-05 ENCOUNTER — HOSPITAL ENCOUNTER (EMERGENCY)
Facility: HOSPITAL | Age: 68
Discharge: HOME | End: 2018-05-05
Attending: EMERGENCY MEDICINE
Payer: MEDICARE

## 2018-05-05 ENCOUNTER — TELEPHONE (OUTPATIENT)
Dept: FAMILY MEDICINE CLINIC | Facility: CLINIC | Age: 68
End: 2018-05-05

## 2018-05-05 ENCOUNTER — APPOINTMENT (OUTPATIENT)
Dept: CT IMAGING | Facility: HOSPITAL | Age: 68
End: 2018-05-05
Attending: EMERGENCY MEDICINE
Payer: MEDICARE

## 2018-05-05 ENCOUNTER — APPOINTMENT (OUTPATIENT)
Dept: GENERAL RADIOLOGY | Facility: HOSPITAL | Age: 68
End: 2018-05-05
Attending: EMERGENCY MEDICINE
Payer: MEDICARE

## 2018-05-05 VITALS
RESPIRATION RATE: 13 BRPM | BODY MASS INDEX: 31 KG/M2 | TEMPERATURE: 98 F | OXYGEN SATURATION: 93 % | SYSTOLIC BLOOD PRESSURE: 186 MMHG | HEART RATE: 88 BPM | WEIGHT: 183 LBS | DIASTOLIC BLOOD PRESSURE: 91 MMHG

## 2018-05-05 DIAGNOSIS — C50.912 MALIGNANT NEOPLASM OF LEFT FEMALE BREAST, UNSPECIFIED ESTROGEN RECEPTOR STATUS, UNSPECIFIED SITE OF BREAST (HCC): Primary | ICD-10-CM

## 2018-05-05 DIAGNOSIS — I10 HYPERTENSION, UNSPECIFIED TYPE: Primary | ICD-10-CM

## 2018-05-05 PROCEDURE — 70450 CT HEAD/BRAIN W/O DYE: CPT | Performed by: EMERGENCY MEDICINE

## 2018-05-05 PROCEDURE — 93010 ELECTROCARDIOGRAM REPORT: CPT

## 2018-05-05 PROCEDURE — 99285 EMERGENCY DEPT VISIT HI MDM: CPT

## 2018-05-05 PROCEDURE — 36591 DRAW BLOOD OFF VENOUS DEVICE: CPT

## 2018-05-05 PROCEDURE — 82962 GLUCOSE BLOOD TEST: CPT

## 2018-05-05 PROCEDURE — 84484 ASSAY OF TROPONIN QUANT: CPT | Performed by: EMERGENCY MEDICINE

## 2018-05-05 PROCEDURE — 85025 COMPLETE CBC W/AUTO DIFF WBC: CPT | Performed by: EMERGENCY MEDICINE

## 2018-05-05 PROCEDURE — 93005 ELECTROCARDIOGRAM TRACING: CPT

## 2018-05-05 PROCEDURE — 80053 COMPREHEN METABOLIC PANEL: CPT | Performed by: EMERGENCY MEDICINE

## 2018-05-05 PROCEDURE — 71045 X-RAY EXAM CHEST 1 VIEW: CPT | Performed by: EMERGENCY MEDICINE

## 2018-05-05 NOTE — TELEPHONE ENCOUNTER
Referral request for Dr. Clarence Christianson. Patient has appt Monday morning    Office notes from Dr. Maryan Haywood M.D.  4/13/18  She  has a past medical history of [facility] (3/31/2014);  Anxiety state, unspecified; Arrhythmia; Breast CA ([facility] CAD

## 2018-05-06 NOTE — ED PROVIDER NOTES
Patient Seen in: [facility] Emergency Department    History   Patient presents with:  Hypertension (cardiovascular)    Stated Complaint: complaint of blood pressure in the \"200s. \" Complaining of \"not feeling well. \"    HPI     Patient is a [place] Decent Past Surgical History:  : ANGIOPLASTY (CORONARY)      Comment: 1 stent per Dr Tova Lainez  No date: BREAST SURGERY      Comment: left lumpectomy  08: [facility] E:   No date: CATARACT EXTRACTION Left  No Grossly normal and symmetric motor strength and sensation proximally and distally throughout 4 extremities. HEENT: No lymphadenopathy.  Mucus membranes moist.   Supple neck without any meningismus or rigidity  Cardiovascular:    Regular rhythm without mur EKG    Rate, intervals and axes as noted on EKG Report.   Rate: 81  Rhythm: Sinus Rhythm  Reading: Normal sinus rhythm without any ectopy, with left axis deviation, normal intervals, no ST segment elevations or depressions or pathologic T-wave inversion No sign of acute sinusitis. MASTOIDS:          No sign of acute inflammation. SKULL:             No evidence for fracture or osseous abnormality. CONCLUSION:  Diffuse atrophy and white matter disease.   The findings suggest chronic small vessel isc this upcoming week about adjusting her antihypertensives. I encouraged to return if her symptoms worsen or if she develops any worrisome symptoms. Otherwise she is to follow-up as an outpatient.   She and her family felt comfortable with this plan and she

## 2018-05-07 ENCOUNTER — PRIOR ORIGINAL RECORDS (OUTPATIENT)
Dept: OTHER | Age: 68
End: 2018-05-07

## 2018-05-07 ENCOUNTER — OFFICE VISIT (OUTPATIENT)
Dept: WOUND CARE | Facility: HOSPITAL | Age: 68
End: 2018-05-07
Attending: NURSE PRACTITIONER
Payer: MEDICARE

## 2018-05-07 ENCOUNTER — OFFICE VISIT (OUTPATIENT)
Dept: FAMILY MEDICINE CLINIC | Facility: CLINIC | Age: 68
End: 2018-05-07

## 2018-05-07 VITALS
BODY MASS INDEX: 30.69 KG/M2 | WEIGHT: 182 LBS | TEMPERATURE: 98 F | DIASTOLIC BLOOD PRESSURE: 86 MMHG | RESPIRATION RATE: 16 BRPM | HEART RATE: 68 BPM | SYSTOLIC BLOOD PRESSURE: 138 MMHG | HEIGHT: 64.5 IN

## 2018-05-07 DIAGNOSIS — L97.929 CHRONIC VENOUS HYPERTENSION (IDIOPATHIC) WITH ULCER OF LEFT LOWER EXTREMITY (HCC): ICD-10-CM

## 2018-05-07 DIAGNOSIS — E11.319 DIABETIC RETINOPATHY OF BOTH EYES ASSOCIATED WITH TYPE 2 DIABETES MELLITUS, MACULAR EDEMA PRESENCE UNSPECIFIED, UNSPECIFIED RETINOPATHY SEVERITY (HCC): Chronic | ICD-10-CM

## 2018-05-07 DIAGNOSIS — I10 ACCELERATED HYPERTENSION: Primary | ICD-10-CM

## 2018-05-07 DIAGNOSIS — I87.312 CHRONIC VENOUS HYPERTENSION (IDIOPATHIC) WITH ULCER OF LEFT LOWER EXTREMITY (HCC): ICD-10-CM

## 2018-05-07 DIAGNOSIS — IMO0001 CHRONIC VENOUS HYPERTENSION WITH ULCER INVOLVING LEFT SIDE: Primary | ICD-10-CM

## 2018-05-07 DIAGNOSIS — I73.9 PERIPHERAL VASCULAR DISEASE, UNSPECIFIED (HCC): ICD-10-CM

## 2018-05-07 DIAGNOSIS — E78.5 HYPERLIPIDEMIA LDL GOAL <100: Chronic | ICD-10-CM

## 2018-05-07 PROCEDURE — 99214 OFFICE O/P EST MOD 30 MIN: CPT | Performed by: FAMILY MEDICINE

## 2018-05-07 PROCEDURE — 29581 APPL MULTLAYER CMPRN SYS LEG: CPT

## 2018-05-07 RX ORDER — CARVEDILOL 12.5 MG/1
12.5 TABLET ORAL 2 TIMES DAILY WITH MEALS
Qty: 180 TABLET | Refills: 1 | Status: SHIPPED | OUTPATIENT
Start: 2018-05-07 | End: 2018-11-07

## 2018-05-07 NOTE — PROGRESS NOTES
Subjective    Chief Complaint  This information was obtained from the patient  Patient here for wound care follow up of right leg wound. Patient reports she was seen in the ER on Saturday 5/4/18 for HTN. Patient is to f/u with pcp later this afternoon. 1/29/18: Pt returns for follow up, has been taking Cipro for wound infection and tolerating well. She was in the ER on Saturday because wound was bleeding heavily and home health RN was unable to get it to stop.  Since then states she has had some bleeding 4/16/18: Pt here for follow up, wound continues to heal well. Pt has not ordered her compression stockings yet as the company asked her if she would like to wait until wounds are healed to order. Pt was instructed to order stockings now.  She also states sh Cardiovascular (Central/Peripheral): Chest Pain, Dyspnea on Exertion, Intermittent Claudication, Lower extremity (leg) resting pain  Gastrointestinal (GI): Bowel Incontinence, Nausea / Vomiting / Diarrhea (N/V/D), Loss of Appetite, Difficulty Swallowing, S Wound #5 Right, Medial Lower Leg is an acute Full Thickness Venous Ulcer and has received a status of Not Healed. Sequela wound encounter measurements are 0.4cm length x 0.3cm width with no measurable depth, with an area of 0.12 sq cm .  No tunneling has be Take you diuretics as directed. Additional Orders: Follow-Up Appointments  Return Appointment in 1 week. RN visit for assessment of wound(s).  - Thursday    Misc/Additional Orders  Increase dietary protein - Focus on increasing protein in your diet

## 2018-05-07 NOTE — PROGRESS NOTES
HPI:   Patient presents with:  ER F/U: Pt was in the ER on 5/5/2018 dx hypertension. She had > 200 SBP 2 days ago, and started more carvedilol. Noy Payton is a 78year old female who presents for recheck of her hypertension.  She has been 4 TO 6 HOURS AS NEEDED FOR PAIN   sodium chloride 0.9 % Irrigation Solution Irrigate with 500 mL as directed 2 (two) times daily. atorvastatin 40 MG Oral Tab Take 1 tablet (40 mg total) by mouth nightly.    Clopidogrel Bisulfate 75 MG Oral Tab Take 1 tabl medications on file prior to visit. Past History:   She has Malignant neoplasm of female breast ([address] CAD, multiple vessel; Osteoporosis; Hypertension goal BP (blood pressure) < 130/80;  Hyperlipidemia LDL goal <100; Vitamin D deficiency; Lumbar sherry exertion  CARDIOVASCULAR: denies chest pain on exertion  GI: denies abdominal pain and denies heartburn  NEURO: denies headaches    EXAM:   /86   Pulse 68   Temp 98 °F (36.7 °C)   Resp 16   Ht 64.5\"   Wt 182 lb   BMI 30.76 kg/m²  Body mass index is Pioglitazone HCl 15 MG Oral Tab    glimepiride 4 MG Oral Tab    MetFORMIN HCl 1000 MG Oral Tab    Insulin Glargine (TOUJEO SOLOSTAR) 300 UNIT/ML Subcutaneous Solution Pen-injector                     Other Visit Diagnoses     Accelerated hypertension    -

## 2018-05-10 DIAGNOSIS — Z79.4 UNCONTROLLED TYPE 2 DIABETES MELLITUS WITH INSULIN THERAPY (HCC): Chronic | ICD-10-CM

## 2018-05-10 DIAGNOSIS — E11.65 UNCONTROLLED TYPE 2 DIABETES MELLITUS WITH INSULIN THERAPY (HCC): Chronic | ICD-10-CM

## 2018-05-11 RX ORDER — GLIMEPIRIDE 4 MG/1
TABLET ORAL
Qty: 90 TABLET | Refills: 3 | Status: SHIPPED | OUTPATIENT
Start: 2018-05-11 | End: 2019-06-10

## 2018-05-14 ENCOUNTER — OFFICE VISIT (OUTPATIENT)
Dept: WOUND CARE | Facility: HOSPITAL | Age: 68
End: 2018-05-14
Attending: NURSE PRACTITIONER
Payer: MEDICARE

## 2018-05-14 DIAGNOSIS — IMO0001 CHRONIC VENOUS HYPERTENSION WITH ULCER INVOLVING LEFT SIDE: Primary | ICD-10-CM

## 2018-05-14 DIAGNOSIS — I87.312 CHRONIC VENOUS HYPERTENSION (IDIOPATHIC) WITH ULCER OF LEFT LOWER EXTREMITY (HCC): ICD-10-CM

## 2018-05-14 DIAGNOSIS — L97.929 CHRONIC VENOUS HYPERTENSION (IDIOPATHIC) WITH ULCER OF LEFT LOWER EXTREMITY (HCC): ICD-10-CM

## 2018-05-14 DIAGNOSIS — I73.9 PERIPHERAL VASCULAR DISEASE, UNSPECIFIED (HCC): ICD-10-CM

## 2018-05-14 NOTE — PROGRESS NOTES
Subjective    Chief Complaint  This information was obtained from the patient  Patient here for wound care follow up of right leg wound. Patient reports JOBST stocking caused bruising on left leg.     Allergies  adhesive tape    HPI  This information was ob 1/29/18: Pt returns for follow up, has been taking Cipro for wound infection and tolerating well. She was in the ER on Saturday because wound was bleeding heavily and home health RN was unable to get it to stop.  Since then states she has had some bleeding 4/16/18: Pt here for follow up, wound continues to heal well. Pt has not ordered her compression stockings yet as the company asked her if she would like to wait until wounds are healed to order. Pt was instructed to order stockings now.  She also states sh Integumentary (Hair/Skin/Nails): Prone to Skin Tears (Plavix ), Dryness (generalized), Hemosiderin Staining (BLE)  Neurological: Loss of Protective Sensation (Pt stated has neuropathy), Headaches  Hematologic/Lymphatic: Bruising (From compression stocking) Wound #5 Right, Medial Lower Leg is an acute Full Thickness Venous Ulcer and has received an outcome of Resolved. Sequela wound encounter measurements are 0cm length x 0cm width with no measurable depth, with an area of 0 sq cm .  No tunneling has been note Calf Measurement 36 cm from heel with right measurement of 34.3 cm  Ankle Measurement 12 cm from heel with right measurement of 27.1 cm  Vascular Assessment:  Left Extremity Pulses:  Dorsalis Pedis: Palpable  Right Extremity Pulses:  Dorsalis Pedis: Palpab Decrease salt intake  S/S of Infection  Non-adherence    Care summary  Discussed the Plan of Care at bedside with patient. The patient verbally acknowledges understanding of all instructions and all questions were answered.    Workflow: Venous  Perfusion as

## 2018-05-21 ENCOUNTER — OFFICE VISIT (OUTPATIENT)
Dept: WOUND CARE | Facility: HOSPITAL | Age: 68
End: 2018-05-21
Attending: NURSE PRACTITIONER
Payer: MEDICARE

## 2018-05-21 DIAGNOSIS — I73.9 PERIPHERAL VASCULAR DISEASE, UNSPECIFIED (HCC): ICD-10-CM

## 2018-05-21 DIAGNOSIS — IMO0001 CHRONIC VENOUS HYPERTENSION WITH ULCER INVOLVING LEFT SIDE: Primary | ICD-10-CM

## 2018-05-21 DIAGNOSIS — I87.312 CHRONIC VENOUS HYPERTENSION (IDIOPATHIC) WITH ULCER OF LEFT LOWER EXTREMITY (HCC): ICD-10-CM

## 2018-05-21 DIAGNOSIS — L97.929 CHRONIC VENOUS HYPERTENSION (IDIOPATHIC) WITH ULCER OF LEFT LOWER EXTREMITY (HCC): ICD-10-CM

## 2018-05-21 PROCEDURE — 29581 APPL MULTLAYER CMPRN SYS LEG: CPT

## 2018-05-21 NOTE — PROGRESS NOTES
Subjective    Chief Complaint  This information was obtained from the patient  Patient here for wound care follow up left leg. Patient still had compression wraps in place. Patient went to [place] [employer] and purchased a compression stocking.      Allergies  adhes 1/29/18: Pt returns for follow up, has been taking Cipro for wound infection and tolerating well. She was in the ER on Saturday because wound was bleeding heavily and home health RN was unable to get it to stop.  Since then states she has had some bleeding 4/16/18: Pt here for follow up, wound continues to heal well. Pt has not ordered her compression stockings yet as the company asked her if she would like to wait until wounds are healed to order. Pt was instructed to order stockings now.  She also states sh Musculoskeletal: Assistive Devices (cane)  Integumentary (Hair/Skin/Nails): Prone to Skin Tears (Plavix ), Dryness (generalized), Hemosiderin Staining (BLE)  Neurological: Loss of Protective Sensation (Pt stated has neuropathy), Headaches  Psychiatric: Dep Wound #6 Left Lower Leg is an acute Full Thickness blister and has received a status of Not Healed. Subsequent wound encounter measurements are 0.7cm length x 0.2cm width with no measurable depth, with an area of 0.14 sq cm . No tunneling has been noted.  Elvin Richards Compression Therapy:  Coflex calamine unna boot  Compression Garment @ ______ mmHg - On right leg  Avoid prolonged standing in one place. Elevate leg(s)as much as possible. Take you diuretics as directed. Additional Orders:     Follow-Up Appointments

## 2018-05-23 ENCOUNTER — OFFICE VISIT (OUTPATIENT)
Dept: FAMILY MEDICINE CLINIC | Facility: CLINIC | Age: 68
End: 2018-05-23

## 2018-05-23 VITALS
HEART RATE: 88 BPM | WEIGHT: 186 LBS | BODY MASS INDEX: 31.37 KG/M2 | DIASTOLIC BLOOD PRESSURE: 60 MMHG | HEIGHT: 64.5 IN | SYSTOLIC BLOOD PRESSURE: 138 MMHG | RESPIRATION RATE: 16 BRPM | TEMPERATURE: 99 F

## 2018-05-23 DIAGNOSIS — E78.5 HYPERLIPIDEMIA LDL GOAL <100: ICD-10-CM

## 2018-05-23 DIAGNOSIS — E11.319 DIABETIC RETINOPATHY OF BOTH EYES ASSOCIATED WITH TYPE 2 DIABETES MELLITUS, MACULAR EDEMA PRESENCE UNSPECIFIED, UNSPECIFIED RETINOPATHY SEVERITY (HCC): Chronic | ICD-10-CM

## 2018-05-23 DIAGNOSIS — E11.9 TYPE 2 DIABETES MELLITUS WITH INSULIN THERAPY (HCC): ICD-10-CM

## 2018-05-23 DIAGNOSIS — I10 ACCELERATED HYPERTENSION: Primary | ICD-10-CM

## 2018-05-23 DIAGNOSIS — I10 HYPERTENSION GOAL BP (BLOOD PRESSURE) < 130/80: Chronic | ICD-10-CM

## 2018-05-23 DIAGNOSIS — R60.0 LOCALIZED EDEMA: ICD-10-CM

## 2018-05-23 DIAGNOSIS — Z79.4 TYPE 2 DIABETES MELLITUS WITH INSULIN THERAPY (HCC): ICD-10-CM

## 2018-05-23 DIAGNOSIS — I25.10 CAD, MULTIPLE VESSEL: Chronic | ICD-10-CM

## 2018-05-23 PROCEDURE — 99213 OFFICE O/P EST LOW 20 MIN: CPT | Performed by: FAMILY MEDICINE

## 2018-05-23 RX ORDER — FUROSEMIDE 20 MG/1
20 TABLET ORAL DAILY
Qty: 90 TABLET | Refills: 3 | Status: SHIPPED | OUTPATIENT
Start: 2018-05-23 | End: 2019-06-10

## 2018-05-23 NOTE — ASSESSMENT & PLAN NOTE
Stable, Continue present management.     Blood Pressure and Cardiac Medications          carvedilol 12.5 MG Oral Tab    Benazepril HCl 20 MG Oral Tab    Verapamil HCl  MG Oral Capsule SR 24 Hr

## 2018-05-23 NOTE — PROGRESS NOTES
Patient presents with:  Hypertension: 2 week f/u Pt has been taking an extra dose of BP med to help with BP. Fatigue: Pt states has been very very fatigued and not sure why.       Subjective   HPI:   This is a 78year old female coming in for follow up BP abdominal pain. Endocrine: Negative. Genitourinary: Negative. Negative for dysuria and difficulty urinating. Musculoskeletal: Negative for joint swelling. Skin: Negative. Negative for rash. Neurological: Negative. Negative for dizziness. Overview     Atorvastatin 20         Current Assessment & Plan     . Stable, Continue present management.     Cholesterol Lowering Medications          atorvastatin 40 MG Oral Tab                 Relevant Medications    furosemide 20 MG Oral Tab       E

## 2018-05-24 ENCOUNTER — PATIENT OUTREACH (OUTPATIENT)
Dept: CASE MANAGEMENT | Age: 68
End: 2018-05-24

## 2018-05-24 DIAGNOSIS — M81.6 LOCALIZED OSTEOPOROSIS WITHOUT CURRENT PATHOLOGICAL FRACTURE: ICD-10-CM

## 2018-05-24 DIAGNOSIS — C50.312 MALIGNANT NEOPLASM OF LOWER-INNER QUADRANT OF LEFT FEMALE BREAST, UNSPECIFIED ESTROGEN RECEPTOR STATUS (HCC): Chronic | ICD-10-CM

## 2018-05-24 DIAGNOSIS — F32.1 MODERATE SINGLE CURRENT EPISODE OF MAJOR DEPRESSIVE DISORDER (HCC): ICD-10-CM

## 2018-05-24 DIAGNOSIS — E11.9 TYPE 2 DIABETES MELLITUS WITH INSULIN THERAPY (HCC): ICD-10-CM

## 2018-05-24 DIAGNOSIS — E78.5 HYPERLIPIDEMIA LDL GOAL <100: Chronic | ICD-10-CM

## 2018-05-24 DIAGNOSIS — I10 HYPERTENSION GOAL BP (BLOOD PRESSURE) < 130/80: Chronic | ICD-10-CM

## 2018-05-24 DIAGNOSIS — D32.9 BENIGN MENINGIOMA (HCC): ICD-10-CM

## 2018-05-24 DIAGNOSIS — I25.10 CAD, MULTIPLE VESSEL: Chronic | ICD-10-CM

## 2018-05-24 DIAGNOSIS — Z79.4 TYPE 2 DIABETES MELLITUS WITH INSULIN THERAPY (HCC): ICD-10-CM

## 2018-05-24 PROCEDURE — 99490 CHRNC CARE MGMT STAFF 1ST 20: CPT

## 2018-05-24 NOTE — ASSESSMENT & PLAN NOTE
.  Stable, Continue present management.     Cholesterol Lowering Medications          atorvastatin 40 MG Oral Tab

## 2018-05-25 NOTE — PROGRESS NOTES
5/25/2018  Spoke to Mike Mera for CCM. Updates to patient care team/comments: n/a. Patient reported changes in medications: per pt she has finished the antibiotics medication list updated.    Med Adherence  Comment: pt reports taking all medications Dr Rosa Guerra [phone] [facility] [phone]        Patient Current Concerns: pt verbalized no current concerns at this time.      Self Management Goals/Action Plan:  Loves watching a [place] show that keeps her interest and pt report Minute Total including today: 32    Physical assessment, complete health history, and need for CCM established by Eduar Alvares MD.

## 2018-05-29 ENCOUNTER — OFFICE VISIT (OUTPATIENT)
Dept: WOUND CARE | Facility: HOSPITAL | Age: 68
End: 2018-05-29
Attending: NURSE PRACTITIONER
Payer: MEDICARE

## 2018-05-29 DIAGNOSIS — IMO0001 CHRONIC VENOUS HYPERTENSION WITH ULCER INVOLVING LEFT SIDE: ICD-10-CM

## 2018-05-29 DIAGNOSIS — I87.312 CHRONIC VENOUS HYPERTENSION (IDIOPATHIC) WITH ULCER OF LEFT LOWER EXTREMITY (HCC): Primary | ICD-10-CM

## 2018-05-29 DIAGNOSIS — L97.929 CHRONIC VENOUS HYPERTENSION (IDIOPATHIC) WITH ULCER OF LEFT LOWER EXTREMITY (HCC): Primary | ICD-10-CM

## 2018-05-29 PROCEDURE — 29581 APPL MULTLAYER CMPRN SYS LEG: CPT

## 2018-05-31 ENCOUNTER — APPOINTMENT (OUTPATIENT)
Dept: WOUND CARE | Facility: HOSPITAL | Age: 68
End: 2018-05-31
Attending: NURSE PRACTITIONER
Payer: MEDICARE

## 2018-05-31 DIAGNOSIS — L97.929 CHRONIC VENOUS HYPERTENSION (IDIOPATHIC) WITH ULCER OF LEFT LOWER EXTREMITY (HCC): Primary | ICD-10-CM

## 2018-05-31 DIAGNOSIS — IMO0001 CHRONIC VENOUS HYPERTENSION WITH ULCER INVOLVING LEFT SIDE: ICD-10-CM

## 2018-05-31 DIAGNOSIS — I87.312 CHRONIC VENOUS HYPERTENSION (IDIOPATHIC) WITH ULCER OF LEFT LOWER EXTREMITY (HCC): Primary | ICD-10-CM

## 2018-05-31 DIAGNOSIS — I73.9 PERIPHERAL VASCULAR DISEASE, UNSPECIFIED (HCC): ICD-10-CM

## 2018-05-31 PROCEDURE — 29581 APPL MULTLAYER CMPRN SYS LEG: CPT

## 2018-06-07 ENCOUNTER — OFFICE VISIT (OUTPATIENT)
Dept: WOUND CARE | Facility: HOSPITAL | Age: 68
End: 2018-06-07
Attending: NURSE PRACTITIONER
Payer: MEDICARE

## 2018-06-07 DIAGNOSIS — I87.312 CHRONIC VENOUS HYPERTENSION (IDIOPATHIC) WITH ULCER OF LEFT LOWER EXTREMITY (HCC): Primary | ICD-10-CM

## 2018-06-07 DIAGNOSIS — IMO0001 CHRONIC VENOUS HYPERTENSION WITH ULCER INVOLVING LEFT SIDE: ICD-10-CM

## 2018-06-07 DIAGNOSIS — L97.929 CHRONIC VENOUS HYPERTENSION (IDIOPATHIC) WITH ULCER OF LEFT LOWER EXTREMITY (HCC): Primary | ICD-10-CM

## 2018-06-07 PROCEDURE — 11042 DBRDMT SUBQ TIS 1ST 20SQCM/<: CPT

## 2018-06-07 PROCEDURE — 29581 APPL MULTLAYER CMPRN SYS LEG: CPT

## 2018-06-11 ENCOUNTER — OFFICE VISIT (OUTPATIENT)
Dept: WOUND CARE | Facility: HOSPITAL | Age: 68
End: 2018-06-11
Attending: NURSE PRACTITIONER
Payer: MEDICARE

## 2018-06-11 DIAGNOSIS — IMO0001 CHRONIC VENOUS HYPERTENSION WITH ULCER INVOLVING LEFT SIDE: ICD-10-CM

## 2018-06-11 DIAGNOSIS — L97.929 CHRONIC VENOUS HYPERTENSION (IDIOPATHIC) WITH ULCER OF LEFT LOWER EXTREMITY (HCC): Primary | ICD-10-CM

## 2018-06-11 DIAGNOSIS — I87.312 CHRONIC VENOUS HYPERTENSION (IDIOPATHIC) WITH ULCER OF LEFT LOWER EXTREMITY (HCC): Primary | ICD-10-CM

## 2018-06-11 DIAGNOSIS — I73.9 PERIPHERAL VASCULAR DISEASE, UNSPECIFIED (HCC): ICD-10-CM

## 2018-06-11 PROCEDURE — 29581 APPL MULTLAYER CMPRN SYS LEG: CPT

## 2018-06-11 PROCEDURE — 97597 DBRDMT OPN WND 1ST 20 CM/<: CPT

## 2018-06-11 NOTE — PROGRESS NOTES
Subjective    Chief Complaint  This information was obtained from the patient  Patient is here for a wound care follow up. She is having a lot of swelling in her right leg and is having difficulty getting on her stocking. She denies any pain to the wound. 1/29/18: Pt returns for follow up, has been taking Cipro for wound infection and tolerating well. She was in the ER on Saturday because wound was bleeding heavily and home health RN was unable to get it to stop.  Since then states she has had some bleeding 4/16/18: Pt here for follow up, wound continues to heal well. Pt has not ordered her compression stockings yet as the company asked her if she would like to wait until wounds are healed to order. Pt was instructed to order stockings now.  She also states sh Integumentary (Hair/Skin/Nails): Prone to Skin Tears (Plavix ), Dryness (generalized), Hemosiderin Staining (BLE)  Neurological: Loss of Protective Sensation (Pt stated has neuropathy), Headaches  Psychiatric: Depression    Patient denies complaints or sym Wound #6 Left Lower Leg is an acute Full Thickness blister and has received a status of Not Healed. Subsequent wound encounter measurements are 0.3cm length x 0.3cm width x 0.1cm depth, with an area of 0.09 sq cm and a volume of 0.009 cubic cm.  No tunnelin Wound #6 (Other) is located on the left lower leg. A skin/subcutaneous tissue level excisional/surgical debridement with a total area debrided of 0.09 sq cm was performed by TYRA Fraga.  Subcutaneous was removed along with devitalized tissue: Last A1C date and value: - 3/8/18: 6.9  Last albumin date and value: - 3/26/18: 3.5    Pain Assessment Follow-up  Pain Assessment follow-up plan for positive pain.  - Per primary care for management of her opioids and neurology for neuropathic pain        W

## 2018-06-11 NOTE — PROGRESS NOTES
Subjective    Chief Complaint  This information was obtained from the patient  Patient is here for a wound care follow up of left leg wound.      Allergies  adhesive tape    HPI  This information was obtained from the patient  The following HPI elements wer 2/5/18: Pt is here for follow up, has not had any more bleeding from wound until today's visit with dressing removal. Has not gotten santly yet, states it should arrive today. 2/12/18: Pt is here for follow up. She has received her santyl.  She was star 4/16/18: Pt here for follow up, wound continues to heal well. Pt has not ordered her compression stockings yet as the company asked her if she would like to wait until wounds are healed to order. Pt was instructed to order stockings now.  She also states sh Genitourinary (): Urinary Incontinence  Musculoskeletal: Assistive Devices (cane)  Integumentary (Hair/Skin/Nails): Prone to Skin Tears (Plavix ), Dryness (generalized), Hemosiderin Staining (BLE)  Neurological: Loss of Protective Sensation (Pt stated ha Wound #6 Left Lower Leg is an acute Full Thickness blister and has received a status of Not Healed. Subsequent wound encounter measurements are 0.2cm length x 0.4cm width x 0.1cm depth, with an area of 0.08 sq cm and a volume of 0.008 cubic cm.  No tunnelin Wound #6 (Other) is located on the left lower leg. A selective debridement with a total area debrided of 0.08 sq cm to remove devitalized tissue: biofilm, fibrin, and slough. The following instrument(s) were used: curette.  Pain control was achieved using 4 Perfusion assessed by doppler. - Monophasic signals at the dp/pt/personal right distal hallux  Last A1C date and value: - 3/8/18: 6.9  Last albumin date and value: - 3/26/18: 3.5    Pain Assessment Follow-up  Pain Assessment follow-up plan for positive sabiha

## 2018-06-12 ENCOUNTER — TELEPHONE (OUTPATIENT)
Dept: FAMILY MEDICINE CLINIC | Facility: CLINIC | Age: 68
End: 2018-06-12

## 2018-06-12 DIAGNOSIS — Z79.4 TYPE 2 DIABETES MELLITUS WITH INSULIN THERAPY (HCC): Primary | ICD-10-CM

## 2018-06-12 DIAGNOSIS — E11.9 TYPE 2 DIABETES MELLITUS WITH INSULIN THERAPY (HCC): Primary | ICD-10-CM

## 2018-06-12 DIAGNOSIS — L97.309: ICD-10-CM

## 2018-06-12 NOTE — TELEPHONE ENCOUNTER
Referral request for the wound clinic for eight more visits    Last referral to wound clinic  2018    Patient was seen at the wound clinic since then on 2018, 2018, 2018.     Last office visit with Dr. Yolanda Burton M.D. 2018 Wound #6 (Other) is located on the left lower leg. A selective debridement with a total area debrided of 0.08 sq cm to remove devitalized tissue: biofilm, fibrin, and slough. The following instrument(s) were used: curette.  Pain control was achieved using 4

## 2018-06-18 ENCOUNTER — OFFICE VISIT (OUTPATIENT)
Dept: WOUND CARE | Facility: HOSPITAL | Age: 68
End: 2018-06-18
Attending: NURSE PRACTITIONER
Payer: MEDICARE

## 2018-06-18 DIAGNOSIS — I73.9 PERIPHERAL VASCULAR DISEASE, UNSPECIFIED (HCC): ICD-10-CM

## 2018-06-18 DIAGNOSIS — I87.312 CHRONIC VENOUS HYPERTENSION (IDIOPATHIC) WITH ULCER OF LEFT LOWER EXTREMITY (HCC): Primary | ICD-10-CM

## 2018-06-18 DIAGNOSIS — IMO0001 CHRONIC VENOUS HYPERTENSION WITH ULCER INVOLVING LEFT SIDE: ICD-10-CM

## 2018-06-18 DIAGNOSIS — L97.929 CHRONIC VENOUS HYPERTENSION (IDIOPATHIC) WITH ULCER OF LEFT LOWER EXTREMITY (HCC): Primary | ICD-10-CM

## 2018-06-18 PROCEDURE — 29581 APPL MULTLAYER CMPRN SYS LEG: CPT

## 2018-06-18 NOTE — PROGRESS NOTES
Subjective    Chief Complaint  This information was obtained from the patient  Patient is here for a wound care follow up of left leg wound.  Patient stated that she was having trouble putting her stocking on    Allergies  adhesive tape    HPI  This informa 1/29/18: Pt returns for follow up, has been taking Cipro for wound infection and tolerating well. She was in the ER on Saturday because wound was bleeding heavily and home health RN was unable to get it to stop.  Since then states she has had some bleeding 4/16/18: Pt here for follow up, wound continues to heal well. Pt has not ordered her compression stockings yet as the company asked her if she would like to wait until wounds are healed to order. Pt was instructed to order stockings now.  She also states sh Complaints and Symptoms  This information was obtained from the patient  Patient complains of:   Respiratory: Other (has sleep apnea but currently using cpap \"it was taken away by medicare\")  Cardiovascular (Central/Peripheral):  Lower extremity (leg) swe Integumentary (Hair, Skin)  Firm wound bed, no subcutaneous nodules, induration or crepitus. Wound #6 Left Lower Leg is an acute Full Thickness blister and has received a status of Not Healed.  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  A Multi Pain Assessment follow-up plan for positive pain. - Per primary care for management of her opioids and neurology for neuropathic pain        Discussed with pt importance of compliance with compression and elevation.  Pt encouraged to go home and elevate leg

## 2018-06-25 ENCOUNTER — OFFICE VISIT (OUTPATIENT)
Dept: WOUND CARE | Facility: HOSPITAL | Age: 68
End: 2018-06-25
Attending: NURSE PRACTITIONER
Payer: MEDICARE

## 2018-06-25 DIAGNOSIS — I87.312 CHRONIC VENOUS HYPERTENSION (IDIOPATHIC) WITH ULCER OF LEFT LOWER EXTREMITY (HCC): Primary | ICD-10-CM

## 2018-06-25 DIAGNOSIS — I73.9 PERIPHERAL VASCULAR DISEASE, UNSPECIFIED (HCC): ICD-10-CM

## 2018-06-25 DIAGNOSIS — IMO0001 CHRONIC VENOUS HYPERTENSION WITH ULCER INVOLVING LEFT SIDE: ICD-10-CM

## 2018-06-25 DIAGNOSIS — L97.929 CHRONIC VENOUS HYPERTENSION (IDIOPATHIC) WITH ULCER OF LEFT LOWER EXTREMITY (HCC): Primary | ICD-10-CM

## 2018-06-25 PROCEDURE — 99213 OFFICE O/P EST LOW 20 MIN: CPT

## 2018-06-25 NOTE — PROGRESS NOTES
Subjective    Chief Complaint  This information was obtained from the patient  Patient is here for a wound care follow up of left leg wound.  Patient hopes to be healed    Allergies  adhesive tape    HPI  This information was obtained from the patient  The 2/5/18: Pt is here for follow up, has not had any more bleeding from wound until today's visit with dressing removal. Has not gotten santly yet, states it should arrive today. 2/12/18: Pt is here for follow up. She has received her santyl.  She was star 4/16/18: Pt here for follow up, wound continues to heal well. Pt has not ordered her compression stockings yet as the company asked her if she would like to wait until wounds are healed to order. Pt was instructed to order stockings now.  She also states sh 6/25/18: Pt has compression stocking on right leg today, has been more adherent with use.     Complaints and Symptoms  This information was obtained from the patient  Patient complains of:   Respiratory: Other (has sleep apnea but currently using cpap \"it Musculoskeletal:  unsteady-uses cane. Integumentary (Hair, Skin)  Warm, dry, well perfused, no rashes or lesions. Wound #6 Left Lower Leg is an acute Full Thickness blister and has received an outcome of Resolved.  Measurements are 0cm length x 0cm Discussed the Plan of Care at bedside with patient. The patient verbally acknowledges understanding of all instructions and all questions were answered.    Workflow: Venous  Perfusion assessed by ANNEMARIE/TBI - TBI Right 0.42, Left 0.46 11/16  CT angio with runo

## 2018-06-27 ENCOUNTER — PATIENT OUTREACH (OUTPATIENT)
Dept: CASE MANAGEMENT | Age: 68
End: 2018-06-27

## 2018-06-27 DIAGNOSIS — F32.1 MODERATE SINGLE CURRENT EPISODE OF MAJOR DEPRESSIVE DISORDER (HCC): ICD-10-CM

## 2018-06-27 DIAGNOSIS — E78.5 HYPERLIPIDEMIA LDL GOAL <100: Chronic | ICD-10-CM

## 2018-06-27 DIAGNOSIS — Z79.4 TYPE 2 DIABETES MELLITUS WITH DIABETIC NEUROPATHY, WITH LONG-TERM CURRENT USE OF INSULIN (HCC): ICD-10-CM

## 2018-06-27 DIAGNOSIS — E11.40 TYPE 2 DIABETES MELLITUS WITH DIABETIC NEUROPATHY, WITH LONG-TERM CURRENT USE OF INSULIN (HCC): ICD-10-CM

## 2018-06-27 DIAGNOSIS — M81.6 LOCALIZED OSTEOPOROSIS WITHOUT CURRENT PATHOLOGICAL FRACTURE: ICD-10-CM

## 2018-06-27 DIAGNOSIS — I10 HYPERTENSION GOAL BP (BLOOD PRESSURE) < 130/80: Chronic | ICD-10-CM

## 2018-06-27 NOTE — PROGRESS NOTES
6/27/2018  Spoke to Diane Maria for [facility]. Updates to patient care team/comments: n/a. Patient reported changes in medications: no changes. Med Adherence  Comment: pt reports taking all medications as prescribed. Health Maintenance:    Your appoi portion control for several months now. Self Management Goals/Action Plan:  Exercise: say's she is more active doing thing around the house. Reports having low energy at times.     • My new goal for next month is: (Patient Stated)           - What: carb

## 2018-06-30 PROCEDURE — 99490 CHRNC CARE MGMT STAFF 1ST 20: CPT

## 2018-07-20 PROBLEM — M47.816 LUMBAR FACET ARTHROPATHY: Status: ACTIVE | Noted: 2018-07-20

## 2018-07-21 NOTE — ASSESSMENT & PLAN NOTE
Continue at least 2 more years, dexa next year no flank pain R/no renal colic/no hematuria/no flank pain L

## 2018-07-23 ENCOUNTER — OFFICE VISIT (OUTPATIENT)
Dept: FAMILY MEDICINE CLINIC | Facility: CLINIC | Age: 68
End: 2018-07-23

## 2018-07-23 VITALS
RESPIRATION RATE: 14 BRPM | HEIGHT: 64.5 IN | HEART RATE: 74 BPM | BODY MASS INDEX: 31.37 KG/M2 | WEIGHT: 186 LBS | SYSTOLIC BLOOD PRESSURE: 136 MMHG | DIASTOLIC BLOOD PRESSURE: 68 MMHG | TEMPERATURE: 97 F

## 2018-07-23 DIAGNOSIS — I10 HYPERTENSION GOAL BP (BLOOD PRESSURE) < 130/80: Primary | Chronic | ICD-10-CM

## 2018-07-23 DIAGNOSIS — E78.5 HYPERLIPIDEMIA LDL GOAL <100: Chronic | ICD-10-CM

## 2018-07-23 DIAGNOSIS — G47.33 OBSTRUCTIVE SLEEP APNEA: Chronic | ICD-10-CM

## 2018-07-23 DIAGNOSIS — M47.816 LUMBAR FACET ARTHROPATHY: ICD-10-CM

## 2018-07-23 PROCEDURE — 99214 OFFICE O/P EST MOD 30 MIN: CPT | Performed by: FAMILY MEDICINE

## 2018-07-23 NOTE — PROGRESS NOTES
HPI:   David Toney is a 80year old female who presents for recheck of her diabetes. Feelin very drowsy, seeing sleep APN, occasionaly feels cold upon awakening at night, but falls asleep again.    Patient presents with:  Hypertension: 2 month f/ 7/20/2018  8:16 PM:  Chol:HDL ratio View Report for additional information:  2.30 3/26/2018:  3mo ago      Last refreshed: 7/20/2018  8:16 PM:  Lipid panel service date 3/26/2018       Last refreshed: 7/20/2018  8:16 PM:  T4 View Report for additional info HDL 61 03/26/2018 09:31 AM   LDL 64 03/26/2018 09:31 AM   CHOLEST 140 03/26/2018 09:31 AM   TRIG 75 03/26/2018 09:31 AM   VLDL 15 03/26/2018 09:31 AM   T4F 0.9 09/19/2017 08:38 AM   TSH 0.701 09/19/2017 08:38 AM   BUN 15 05/05/2018 06:25 PM   CREATSERUM tablet (20 mg total) by mouth daily. GLIMEPIRIDE 4 MG Oral Tab TAKE 1 TABLET IN THE MORNING  BEFORE  BREAKFAST   carvedilol 12.5 MG Oral Tab Take 1 tablet (12.5 mg total) by mouth 2 (two) times daily with meals.    gabapentin 300 MG Oral Cap    SANTYL 250 PLUS) w/Device Does not apply Kit 1 Device by Other route 4 (four) times daily. No current facility-administered medications on file prior to visit. REVIEW OF SYSTEMS:   Review of Systems   Constitutional: Negative.   Negative for activity change, a lose weight by increased dietary compliance and exercise, see ophthalmology soon, check feet daily and will check labs as ordered. As for her hypertension, Blood Pressure is well controlled, no significant medication side effects noted.    PLAN: will co

## 2018-07-25 ENCOUNTER — PATIENT OUTREACH (OUTPATIENT)
Dept: CASE MANAGEMENT | Age: 68
End: 2018-07-25

## 2018-07-31 ENCOUNTER — OFFICE VISIT (OUTPATIENT)
Dept: NEUROLOGY | Facility: CLINIC | Age: 68
End: 2018-07-31

## 2018-07-31 VITALS
HEART RATE: 80 BPM | BODY MASS INDEX: 31 KG/M2 | WEIGHT: 186 LBS | SYSTOLIC BLOOD PRESSURE: 130 MMHG | DIASTOLIC BLOOD PRESSURE: 80 MMHG

## 2018-07-31 DIAGNOSIS — I63.9 BRAINSTEM INFARCTION (HCC): ICD-10-CM

## 2018-07-31 PROCEDURE — 99213 OFFICE O/P EST LOW 20 MIN: CPT | Performed by: OTHER

## 2018-07-31 RX ORDER — ATORVASTATIN CALCIUM 40 MG/1
40 TABLET, FILM COATED ORAL NIGHTLY
Qty: 90 TABLET | Refills: 2 | Status: SHIPPED | OUTPATIENT
Start: 2018-07-31 | End: 2019-05-07

## 2018-07-31 NOTE — PROGRESS NOTES
Neurology Outpatient    Anshu Roles Patient Status:  No patient class for patient encounter    1950 MRN [id number]   Location [address] Attending No att. providers found   [address] Day #  PCP Gus Galo MD     Subjective:  Xuan Jones Prescriptions:  furosemide 20 MG Oral Tab Take 1 tablet (20 mg total) by mouth daily. Disp: 90 tablet Rfl: 3   GLIMEPIRIDE 4 MG Oral Tab TAKE 1 TABLET IN THE MORNING  BEFORE  BREAKFAST Disp: 90 tablet Rfl: 3   carvedilol 12.5 MG Oral Tab Take 1 tablet (12. Pen-injector Inject 20 Units into the skin daily. (Patient taking differently: Inject 10 Units into the skin every evening. Only if BS > 200 ) Disp: 10 pen Rfl: 3   Multiple Vitamin (ONE-DAILY MULTI VITAMINS) Oral Tab Take 1 tablet by mouth daily.  Disp:  R • Diabetes [facility])    • Diabetic retin[facility] of both eyes ([facility] 2/12/2014    mild   • Diverticulitis    • Facial cellulitis 4/14/2015   • Heart attack ([facility]     12/6/2013   • NSTEMI (non-ST elevated myocardial infarction) ([facility] 12/6/2013   • Obstructive sl weight loss  Vision: no vision changes, no new blurry or double vision  Head and Neck: no eye or ear discharge  Pulmonary: no SOB, no new cough  CV: no chest pain, RLE edema  GI: no constipation or abdominal pain  : denies frequent urination or difficult triceps, 1+ at patella, absent at ankles at the ankles (edema at ankles)    GAIT: walks with 4 post cane, wide based, careful gait, unable to toe or heel walk           Imaging:  MRI/MRA 8/8/16  FINDINGS:      No evidence for restricted diffusion.  No acute seen within the right cervical medullary junction, 2.5 mm. There is also a subtle sessile enhancing extra-axial    plaque-like focus in the left frontal region may reflect incidental meningioma about 2.5 mm thick.     [facility] 8/11/16  CONCLUSION:     1.  Please

## 2018-07-31 NOTE — PROGRESS NOTES
Patient here for stroke f/u. Patient stated stroke was in 2016. States she has been having Insomnia since her stroke and it is making her very tired, also affecting her functionality and daily activities.

## 2018-08-02 ENCOUNTER — PATIENT OUTREACH (OUTPATIENT)
Dept: CASE MANAGEMENT | Age: 68
End: 2018-08-02

## 2018-08-08 ENCOUNTER — APPOINTMENT (OUTPATIENT)
Dept: CT IMAGING | Facility: HOSPITAL | Age: 68
End: 2018-08-08
Attending: EMERGENCY MEDICINE
Payer: MEDICARE

## 2018-08-08 ENCOUNTER — HOSPITAL ENCOUNTER (EMERGENCY)
Facility: HOSPITAL | Age: 68
Discharge: HOME | End: 2018-08-08
Attending: EMERGENCY MEDICINE
Payer: MEDICARE

## 2018-08-08 VITALS
RESPIRATION RATE: 18 BRPM | OXYGEN SATURATION: 98 % | HEIGHT: 64 IN | HEART RATE: 82 BPM | BODY MASS INDEX: 30.73 KG/M2 | TEMPERATURE: 98 F | WEIGHT: 180 LBS | SYSTOLIC BLOOD PRESSURE: 176 MMHG | DIASTOLIC BLOOD PRESSURE: 78 MMHG

## 2018-08-08 DIAGNOSIS — R10.9 ABDOMINAL PAIN OF UNKNOWN ETIOLOGY: Primary | ICD-10-CM

## 2018-08-08 LAB
ALBUMIN SERPL-MCNC: 3.7 G/DL (ref 3.5–4.8)
ALBUMIN/GLOB SERPL: 0.9 {RATIO} (ref 1–2)
ALP LIVER SERPL-CCNC: 79 U/L (ref 55–142)
ALT SERPL-CCNC: 25 U/L (ref 14–54)
ANION GAP SERPL CALC-SCNC: 7 MMOL/L (ref 0–18)
AST SERPL-CCNC: 13 U/L (ref 15–41)
BASOPHILS # BLD AUTO: 0.02 X10(3) UL (ref 0–0.1)
BASOPHILS NFR BLD AUTO: 0.4 %
BILIRUB SERPL-MCNC: 0.9 MG/DL (ref 0.1–2)
BILIRUB UR QL STRIP.AUTO: NEGATIVE
BUN BLD-MCNC: 13 MG/DL (ref 8–20)
BUN/CREAT SERPL: 21.3 (ref 10–20)
CALCIUM BLD-MCNC: 9 MG/DL (ref 8.3–10.3)
CHLORIDE SERPL-SCNC: 105 MMOL/L (ref 101–111)
CLARITY UR REFRACT.AUTO: CLEAR
CO2 SERPL-SCNC: 26 MMOL/L (ref 22–32)
COLOR UR AUTO: YELLOW
CREAT BLD-MCNC: 0.61 MG/DL (ref 0.55–1.02)
EOSINOPHIL # BLD AUTO: 0.11 X10(3) UL (ref 0–0.3)
EOSINOPHIL NFR BLD AUTO: 2.2 %
ERYTHROCYTE [DISTWIDTH] IN BLOOD BY AUTOMATED COUNT: 12.3 % (ref 11.5–16)
GLOBULIN PLAS-MCNC: 4.1 G/DL (ref 2.5–3.7)
GLUCOSE BLD-MCNC: 227 MG/DL (ref 70–99)
GLUCOSE UR STRIP.AUTO-MCNC: 100 MG/DL
HCT VFR BLD AUTO: 38.3 % (ref 34–50)
HGB BLD-MCNC: 12.8 G/DL (ref 12–16)
IMMATURE GRANULOCYTE COUNT: 0.02 X10(3) UL (ref 0–1)
IMMATURE GRANULOCYTE RATIO %: 0.4 %
KETONES UR STRIP.AUTO-MCNC: NEGATIVE MG/DL
LIPASE: 61 U/L (ref 73–393)
LYMPHOCYTES # BLD AUTO: 1 X10(3) UL (ref 0.9–4)
LYMPHOCYTES NFR BLD AUTO: 20.4 %
M PROTEIN MFR SERPL ELPH: 7.8 G/DL (ref 6.1–8.3)
MCH RBC QN AUTO: 29.5 PG (ref 27–33.2)
MCHC RBC AUTO-ENTMCNC: 33.4 G/DL (ref 31–37)
MCV RBC AUTO: 88.2 FL (ref 81–100)
MONOCYTES # BLD AUTO: 0.3 X10(3) UL (ref 0.1–1)
MONOCYTES NFR BLD AUTO: 6.1 %
NEUTROPHIL ABS PRELIM: 3.44 X10 (3) UL (ref 1.3–6.7)
NEUTROPHILS # BLD AUTO: 3.44 X10(3) UL (ref 1.3–6.7)
NEUTROPHILS NFR BLD AUTO: 70.5 %
NITRITE UR QL STRIP.AUTO: NEGATIVE
OSMOLALITY SERPL CALC.SUM OF ELEC: 293 MOSM/KG (ref 275–295)
PH UR STRIP.AUTO: 7 [PH] (ref 4.5–8)
PLATELET # BLD AUTO: 181 10(3)UL (ref 150–450)
POTASSIUM SERPL-SCNC: 4 MMOL/L (ref 3.6–5.1)
PROT UR STRIP.AUTO-MCNC: NEGATIVE MG/DL
RBC # BLD AUTO: 4.34 X10(6)UL (ref 3.8–5.1)
RED CELL DISTRIBUTION WIDTH-SD: 39.5 FL (ref 35.1–46.3)
SODIUM SERPL-SCNC: 138 MMOL/L (ref 136–144)
SP GR UR STRIP.AUTO: 1.02 (ref 1–1.03)
TROPONIN I SERPL-MCNC: <0.046 NG/ML (ref ?–0.05)
UROBILINOGEN UR STRIP.AUTO-MCNC: 0.2 MG/DL
WBC # BLD AUTO: 4.9 X10(3) UL (ref 4–13)

## 2018-08-08 PROCEDURE — 80053 COMPREHEN METABOLIC PANEL: CPT

## 2018-08-08 PROCEDURE — 99285 EMERGENCY DEPT VISIT HI MDM: CPT

## 2018-08-08 PROCEDURE — 96361 HYDRATE IV INFUSION ADD-ON: CPT

## 2018-08-08 PROCEDURE — 85025 COMPLETE CBC W/AUTO DIFF WBC: CPT | Performed by: EMERGENCY MEDICINE

## 2018-08-08 PROCEDURE — 93010 ELECTROCARDIOGRAM REPORT: CPT

## 2018-08-08 PROCEDURE — 96374 THER/PROPH/DIAG INJ IV PUSH: CPT

## 2018-08-08 PROCEDURE — 87077 CULTURE AEROBIC IDENTIFY: CPT | Performed by: EMERGENCY MEDICINE

## 2018-08-08 PROCEDURE — 83690 ASSAY OF LIPASE: CPT

## 2018-08-08 PROCEDURE — 74176 CT ABD & PELVIS W/O CONTRAST: CPT | Performed by: EMERGENCY MEDICINE

## 2018-08-08 PROCEDURE — 80053 COMPREHEN METABOLIC PANEL: CPT | Performed by: EMERGENCY MEDICINE

## 2018-08-08 PROCEDURE — 83690 ASSAY OF LIPASE: CPT | Performed by: EMERGENCY MEDICINE

## 2018-08-08 PROCEDURE — 93005 ELECTROCARDIOGRAM TRACING: CPT

## 2018-08-08 PROCEDURE — 96375 TX/PRO/DX INJ NEW DRUG ADDON: CPT

## 2018-08-08 PROCEDURE — 87086 URINE CULTURE/COLONY COUNT: CPT | Performed by: EMERGENCY MEDICINE

## 2018-08-08 PROCEDURE — 81001 URINALYSIS AUTO W/SCOPE: CPT | Performed by: EMERGENCY MEDICINE

## 2018-08-08 PROCEDURE — 85025 COMPLETE CBC W/AUTO DIFF WBC: CPT

## 2018-08-08 PROCEDURE — 87186 SC STD MICRODIL/AGAR DIL: CPT | Performed by: EMERGENCY MEDICINE

## 2018-08-08 PROCEDURE — 84484 ASSAY OF TROPONIN QUANT: CPT | Performed by: EMERGENCY MEDICINE

## 2018-08-08 RX ORDER — HYDROMORPHONE HYDROCHLORIDE 1 MG/ML
0.5 INJECTION, SOLUTION INTRAMUSCULAR; INTRAVENOUS; SUBCUTANEOUS ONCE
Status: DISCONTINUED | OUTPATIENT
Start: 2018-08-08 | End: 2018-08-08

## 2018-08-08 RX ORDER — SODIUM CHLORIDE 9 MG/ML
INJECTION, SOLUTION INTRAVENOUS CONTINUOUS
Status: DISCONTINUED | OUTPATIENT
Start: 2018-08-08 | End: 2018-08-08

## 2018-08-08 RX ORDER — MORPHINE SULFATE 4 MG/ML
4 INJECTION, SOLUTION INTRAMUSCULAR; INTRAVENOUS ONCE
Status: COMPLETED | OUTPATIENT
Start: 2018-08-08 | End: 2018-08-08

## 2018-08-08 RX ORDER — PANTOPRAZOLE SODIUM 20 MG/1
20 TABLET, DELAYED RELEASE ORAL DAILY
Qty: 30 TABLET | Refills: 0 | Status: SHIPPED | OUTPATIENT
Start: 2018-08-08 | End: 2018-09-07

## 2018-08-08 RX ORDER — ONDANSETRON 2 MG/ML
4 INJECTION INTRAMUSCULAR; INTRAVENOUS ONCE
Status: COMPLETED | OUTPATIENT
Start: 2018-08-08 | End: 2018-08-08

## 2018-08-08 NOTE — ED INITIAL ASSESSMENT (HPI)
Pt states went to block party on Sunday, on Monday had abd pain rgt sided, no vomiting, no diarrhea, just nausea, pain still there just worse, last bm yesterday was normal

## 2018-08-08 NOTE — ED PROVIDER NOTES
Patient Seen in: [facility] Emergency Department    History   Patient presents with:  Abdomen/Flank Pain (GI/)    Stated Complaint: abd pain, nausea    HPI    29-year-old white female who presents emergency room today frequent of abdominal pain. PERCUTANEOUS  TRANSLUMINAL CORONARY ANGIO*  2008: CHEMOTHERAPY  No date: CHOLECYSTECTOMY      Comment: laparoscopic May 2012 [facility] Dr Gogo Salazar  1/1/08: COLONOSCOPY  11/26/2013: COLONOSCOPY      Comment: Procedure: COLONOSCOPY;  Surgeon: Tiffanie Hou, in the upper extremities .     There are no rashes noted on skin exam.      ED Course     Labs Reviewed   COMP METABOLIC PANEL (14) - Abnormal; Notable for the following:        Result Value    Glucose 227 (*)     BUN/CREA Ratio 21.3 (*)     AST 13 (*) inflammatory changes involving the bowel.  The appendix is unremarkable. ABDOMINAL WALL:  Tiny fat containing umbilical hernia is noted.  Mild diastases of the rectus musculature is noted. BONES:  Marked L4 compression deformity is stable.   PELVIC ORGANS (primary encounter diagnosis)    Disposition:  Discharge  8/8/2018 12:33 pm    Follow-up:  Mary Jo La MD  [address] [address] [facility] [phone]    In 2 days          Medications Prescribed:  Current Discharge Medication List    STAR

## 2018-08-10 LAB
ATRIAL RATE: 80 BPM
P AXIS: 41 DEGREES
P-R INTERVAL: 166 MS
Q-T INTERVAL: 414 MS
QRS DURATION: 102 MS
QTC CALCULATION (BEZET): 477 MS
R AXIS: -57 DEGREES
T AXIS: 34 DEGREES
VENTRICULAR RATE: 80 BPM

## 2018-08-10 RX ORDER — SULFAMETHOXAZOLE AND TRIMETHOPRIM 800; 160 MG/1; MG/1
1 TABLET ORAL 2 TIMES DAILY
Qty: 6 TABLET | Refills: 0 | Status: SHIPPED | OUTPATIENT
Start: 2018-08-10 | End: 2018-08-13

## 2018-08-16 ENCOUNTER — OFFICE VISIT (OUTPATIENT)
Dept: FAMILY MEDICINE CLINIC | Facility: CLINIC | Age: 68
End: 2018-08-16

## 2018-08-16 VITALS
SYSTOLIC BLOOD PRESSURE: 120 MMHG | HEART RATE: 88 BPM | DIASTOLIC BLOOD PRESSURE: 60 MMHG | TEMPERATURE: 97 F | BODY MASS INDEX: 31.94 KG/M2 | HEIGHT: 64.5 IN | WEIGHT: 189.38 LBS

## 2018-08-16 DIAGNOSIS — Z79.4 TYPE 2 DIABETES MELLITUS WITH DIABETIC NEUROPATHY, WITH LONG-TERM CURRENT USE OF INSULIN (HCC): Primary | ICD-10-CM

## 2018-08-16 DIAGNOSIS — E11.40 TYPE 2 DIABETES MELLITUS WITH DIABETIC NEUROPATHY, WITH LONG-TERM CURRENT USE OF INSULIN (HCC): Primary | ICD-10-CM

## 2018-08-16 DIAGNOSIS — K63.5 POLYP OF COLON, UNSPECIFIED PART OF COLON, UNSPECIFIED TYPE: ICD-10-CM

## 2018-08-16 DIAGNOSIS — E11.319 DIABETIC RETINOPATHY OF BOTH EYES ASSOCIATED WITH TYPE 2 DIABETES MELLITUS, MACULAR EDEMA PRESENCE UNSPECIFIED, UNSPECIFIED RETINOPATHY SEVERITY (HCC): Chronic | ICD-10-CM

## 2018-08-16 PROCEDURE — 99214 OFFICE O/P EST MOD 30 MIN: CPT | Performed by: FAMILY MEDICINE

## 2018-08-16 NOTE — PROGRESS NOTES
HPI:   Miley Felix is a 80year old female who presents for recheck of her diabetes. Seen in ER last week for chest pressure, better pain, stable labs,   CT normal/stable. Patient presents with:  ER F/U: abdominal pain. Being treated for UTI. additional information:  15 mg/dL 3/26/2018:  4mo ago      Last refreshed: 8/14/2018  4:05 PM:  Chol:HDL ratio View Report for additional information:  2.30 3/26/2018:  4mo ago      Last refreshed: 8/14/2018  4:05 PM:  Lipid panel service date 3/26/2018 Peripheral Vascular Disease      Current as of: 8/14/2018  4:05 PM             Diabetic [facility]     Lab Results  Component Value Date/Time   A1C 6.9 (A) 03/08/2018 11:45 AM    (H) 08/08/2018 10:21 AM   HDL 61 03/26/2018 09:31 AM   LDL 64 tobacco. She reports that she drinks alcohol. She reports that she does not use drugs. She is allergic to adhesive tape.      Current Outpatient Prescriptions on File Prior to Visit:  Pantoprazole Sodium 20 MG Oral Tab EC Take 1 tablet (20 mg total) by Device by Other route 4 (four) times daily. ergocalciferol 29583 units Oral Cap Take 1 capsule (50,000 Units total) by mouth once a week. Fesoterodine Fumarate ER 4 MG Oral Tablet 24 Hr Take 4 mg by mouth daily.      No current facility-administered med for her Diabetes, it is well controlled, no significant medication side effects noted.      Recommendations are: continue present meds, lose weight by increased dietary compliance and exercise, see ophthalmology soon, check feet daily and will check labs as Follow-up in 3 months and lab work again in about 4-5 months after the first of the year  Return in about 3 months (around 11/16/2018) for diabetes follow up.     Jac Stapleton, 8/16/2018, 9:31 AM

## 2018-08-23 ENCOUNTER — PATIENT OUTREACH (OUTPATIENT)
Dept: CASE MANAGEMENT | Age: 68
End: 2018-08-23

## 2018-09-06 ENCOUNTER — PATIENT OUTREACH (OUTPATIENT)
Dept: CASE MANAGEMENT | Age: 68
End: 2018-09-06

## 2018-09-06 DIAGNOSIS — Z79.4 TYPE 2 DIABETES MELLITUS WITH DIABETIC NEUROPATHY, WITH LONG-TERM CURRENT USE OF INSULIN (HCC): ICD-10-CM

## 2018-09-06 DIAGNOSIS — M81.6 LOCALIZED OSTEOPOROSIS WITHOUT CURRENT PATHOLOGICAL FRACTURE: ICD-10-CM

## 2018-09-06 DIAGNOSIS — E78.5 HYPERLIPIDEMIA LDL GOAL <100: Chronic | ICD-10-CM

## 2018-09-06 DIAGNOSIS — I77.9 BILATERAL CAROTID ARTERY DISEASE, UNSPECIFIED TYPE (HCC): ICD-10-CM

## 2018-09-06 DIAGNOSIS — I10 HYPERTENSION GOAL BP (BLOOD PRESSURE) < 130/80: Chronic | ICD-10-CM

## 2018-09-06 DIAGNOSIS — F32.1 MODERATE SINGLE CURRENT EPISODE OF MAJOR DEPRESSIVE DISORDER (HCC): ICD-10-CM

## 2018-09-06 DIAGNOSIS — E11.40 TYPE 2 DIABETES MELLITUS WITH DIABETIC NEUROPATHY, WITH LONG-TERM CURRENT USE OF INSULIN (HCC): ICD-10-CM

## 2018-09-06 DIAGNOSIS — C50.312 MALIGNANT NEOPLASM OF LOWER-INNER QUADRANT OF LEFT FEMALE BREAST, UNSPECIFIED ESTROGEN RECEPTOR STATUS (HCC): Chronic | ICD-10-CM

## 2018-09-06 NOTE — PROGRESS NOTES
9/6/2018  Spoke to [place] for [address]. Updates to patient care team/comments: yes, [facility] JOANN Thomas Nurse Practitioner. Patient reported changes in medications: no changes.    Med Adherence  Comment: pt reports taking al some unforseen situations after her mom passing. Mom passed 2 years ago and pt states she has had issues with the [employer] (her mom was getting social security) stating she owes money.   Pt reports sending a check but they are slower in processing to use CPAP for long term health  Together we reviewed the above for better compliance and reminding. Also discussed the below with pt for her fatigue per Hussein Gregg. -limit naps to no more than 30 minutes.  -keep sleep diary.   Sending information on T

## 2018-09-19 DIAGNOSIS — Z79.4 TYPE 2 DIABETES MELLITUS WITHOUT COMPLICATION, WITH LONG-TERM CURRENT USE OF INSULIN (HCC): ICD-10-CM

## 2018-09-19 DIAGNOSIS — I10 HYPERTENSION GOAL BP (BLOOD PRESSURE) < 130/80: Chronic | ICD-10-CM

## 2018-09-19 DIAGNOSIS — E11.40 TYPE 2 DIABETES, CONTROLLED, WITH NEUROPATHY (HCC): Chronic | ICD-10-CM

## 2018-09-19 DIAGNOSIS — E11.9 TYPE 2 DIABETES MELLITUS WITHOUT COMPLICATION, WITH LONG-TERM CURRENT USE OF INSULIN (HCC): ICD-10-CM

## 2018-09-19 DIAGNOSIS — IMO0001 INSULIN DEPENDENT DIABETES MELLITUS WITH COMPLICATIONS: ICD-10-CM

## 2018-09-23 RX ORDER — CARVEDILOL 6.25 MG/1
TABLET ORAL
Qty: 180 TABLET | Refills: 3 | Status: SHIPPED | OUTPATIENT
Start: 2018-09-23 | End: 2018-10-15 | Stop reason: ALTCHOICE

## 2018-09-23 RX ORDER — BLOOD-GLUCOSE METER
EACH MISCELLANEOUS
Qty: 1 KIT | Refills: 3 | Status: SHIPPED | OUTPATIENT
Start: 2018-09-23 | End: 2019-09-11

## 2018-09-23 RX ORDER — BLOOD GLUCOSE CONTROL HIGH,LOW
EACH MISCELLANEOUS
Qty: 3 EACH | Refills: 3 | Status: SHIPPED | OUTPATIENT
Start: 2018-09-23 | End: 2020-01-27

## 2018-09-23 RX ORDER — BENAZEPRIL HYDROCHLORIDE 20 MG/1
20 TABLET ORAL DAILY
Qty: 90 TABLET | Refills: 3 | Status: SHIPPED | OUTPATIENT
Start: 2018-09-23 | End: 2020-01-01

## 2018-09-23 RX ORDER — BLOOD SUGAR DIAGNOSTIC
STRIP MISCELLANEOUS
Qty: 350 STRIP | Refills: 3 | Status: SHIPPED | OUTPATIENT
Start: 2018-09-23 | End: 2020-01-27

## 2018-09-23 RX ORDER — ISOPROPYL ALCOHOL 0.75 G/1
SWAB TOPICAL
Qty: 400 EACH | Refills: 3 | Status: SHIPPED | OUTPATIENT
Start: 2018-09-23 | End: 2018-11-19

## 2018-09-30 PROCEDURE — 99490 CHRNC CARE MGMT STAFF 1ST 20: CPT

## 2018-10-08 ENCOUNTER — PATIENT OUTREACH (OUTPATIENT)
Dept: CASE MANAGEMENT | Age: 68
End: 2018-10-08

## 2018-10-08 DIAGNOSIS — M81.6 LOCALIZED OSTEOPOROSIS WITHOUT CURRENT PATHOLOGICAL FRACTURE: ICD-10-CM

## 2018-10-08 DIAGNOSIS — F32.1 MODERATE SINGLE CURRENT EPISODE OF MAJOR DEPRESSIVE DISORDER (HCC): ICD-10-CM

## 2018-10-08 DIAGNOSIS — E11.40 TYPE 2 DIABETES MELLITUS WITH DIABETIC NEUROPATHY, WITH LONG-TERM CURRENT USE OF INSULIN (HCC): ICD-10-CM

## 2018-10-08 DIAGNOSIS — Z79.4 TYPE 2 DIABETES MELLITUS WITH DIABETIC NEUROPATHY, WITH LONG-TERM CURRENT USE OF INSULIN (HCC): ICD-10-CM

## 2018-10-08 DIAGNOSIS — E78.5 HYPERLIPIDEMIA LDL GOAL <100: Chronic | ICD-10-CM

## 2018-10-08 DIAGNOSIS — I77.9 BILATERAL CAROTID ARTERY DISEASE, UNSPECIFIED TYPE (HCC): ICD-10-CM

## 2018-10-08 DIAGNOSIS — I10 HYPERTENSION GOAL BP (BLOOD PRESSURE) < 130/80: Chronic | ICD-10-CM

## 2018-10-08 NOTE — PROGRESS NOTES
10/8/2018  Spoke to Jeannine Hooker for CCM. Updates to patient care team/comments: n/a. Patient reported changes in medications: no changes. Med Adherence  Comment: pt reports taking all medications as prescribed.        Health Maintenance: discussed an Follow a mediterranean diet  - Abstain from tobacco products    Per Marni Simon advised of the following at visit:   -Recommended at least 7 hours of sleep nightly with CPAP.   -Advised patient that proper compliance and tolerance of the CPAP will correlate to

## 2018-10-31 PROCEDURE — 99490 CHRNC CARE MGMT STAFF 1ST 20: CPT

## 2018-11-07 RX ORDER — CARVEDILOL 12.5 MG/1
TABLET ORAL
Qty: 180 TABLET | Refills: 0 | Status: SHIPPED | OUTPATIENT
Start: 2018-11-07 | End: 2018-11-19

## 2018-11-08 ENCOUNTER — PATIENT OUTREACH (OUTPATIENT)
Dept: CASE MANAGEMENT | Age: 68
End: 2018-11-08

## 2018-11-08 DIAGNOSIS — E78.5 HYPERLIPIDEMIA LDL GOAL <100: Chronic | ICD-10-CM

## 2018-11-08 DIAGNOSIS — I10 HYPERTENSION GOAL BP (BLOOD PRESSURE) < 130/80: Chronic | ICD-10-CM

## 2018-11-08 DIAGNOSIS — E11.40 TYPE 2 DIABETES MELLITUS WITH DIABETIC NEUROPATHY, WITH LONG-TERM CURRENT USE OF INSULIN (HCC): ICD-10-CM

## 2018-11-08 DIAGNOSIS — M81.6 LOCALIZED OSTEOPOROSIS WITHOUT CURRENT PATHOLOGICAL FRACTURE: ICD-10-CM

## 2018-11-08 DIAGNOSIS — F32.1 MODERATE SINGLE CURRENT EPISODE OF MAJOR DEPRESSIVE DISORDER (HCC): ICD-10-CM

## 2018-11-08 DIAGNOSIS — I25.10 CAD, MULTIPLE VESSEL: Chronic | ICD-10-CM

## 2018-11-08 DIAGNOSIS — Z79.4 TYPE 2 DIABETES MELLITUS WITH DIABETIC NEUROPATHY, WITH LONG-TERM CURRENT USE OF INSULIN (HCC): ICD-10-CM

## 2018-11-08 NOTE — PROGRESS NOTES
11/8/2018  Spoke to Jaylen Enriquez for CCM. Updates to patient care team/comments: n/a. Patient reported changes in medications: yes, pt reports stopping the Jardiance (to expensive) and Dr. Lino Yu changed to pioglitazone 15 mg daily.    Med Adherence  Comm Then for lunch she had her left over salad from [place]. Drinks about 8 oz of tea and 16 oz water a day. Sleeping is still broken wakes up in the middle of the night but does go back to sleep. Pt relates she is getting more sleep then before.   Us

## 2018-11-08 NOTE — TELEPHONE ENCOUNTER
Name from pharmacy: CARVEDILOL 12.5MG TABLETS          Will file in chart as: CARVEDILOL 12.5 MG Oral Tab    Sig: TAKE 1 TABLET BY MOUTH TWICE DAILY WITH MEALS    Disp:  180 tablet    Refills:  0    Start: 11/7/2018     Class: Normal    Requested on: 11/7/

## 2018-11-12 ENCOUNTER — PRIOR ORIGINAL RECORDS (OUTPATIENT)
Dept: OTHER | Age: 68
End: 2018-11-12

## 2018-11-19 ENCOUNTER — OFFICE VISIT (OUTPATIENT)
Dept: FAMILY MEDICINE CLINIC | Facility: CLINIC | Age: 68
End: 2018-11-19

## 2018-11-19 VITALS
HEART RATE: 88 BPM | HEIGHT: 65 IN | WEIGHT: 189 LBS | SYSTOLIC BLOOD PRESSURE: 128 MMHG | TEMPERATURE: 97 F | DIASTOLIC BLOOD PRESSURE: 70 MMHG | RESPIRATION RATE: 16 BRPM | BODY MASS INDEX: 31.49 KG/M2

## 2018-11-19 DIAGNOSIS — E78.5 HYPERLIPIDEMIA LDL GOAL <100: Chronic | ICD-10-CM

## 2018-11-19 DIAGNOSIS — E11.319 DIABETIC RETINOPATHY OF BOTH EYES ASSOCIATED WITH TYPE 2 DIABETES MELLITUS, MACULAR EDEMA PRESENCE UNSPECIFIED, UNSPECIFIED RETINOPATHY SEVERITY (HCC): Chronic | ICD-10-CM

## 2018-11-19 DIAGNOSIS — Z79.4 TYPE 2 DIABETES MELLITUS WITH DIABETIC NEUROPATHY, WITH LONG-TERM CURRENT USE OF INSULIN (HCC): Primary | ICD-10-CM

## 2018-11-19 DIAGNOSIS — I10 HYPERTENSION GOAL BP (BLOOD PRESSURE) < 130/80: Chronic | ICD-10-CM

## 2018-11-19 DIAGNOSIS — E11.40 TYPE 2 DIABETES MELLITUS WITH DIABETIC NEUROPATHY, WITH LONG-TERM CURRENT USE OF INSULIN (HCC): Primary | ICD-10-CM

## 2018-11-19 DIAGNOSIS — R35.0 FREQUENT URINATION: ICD-10-CM

## 2018-11-19 PROCEDURE — 87086 URINE CULTURE/COLONY COUNT: CPT | Performed by: FAMILY MEDICINE

## 2018-11-19 PROCEDURE — 99214 OFFICE O/P EST MOD 30 MIN: CPT | Performed by: FAMILY MEDICINE

## 2018-11-19 PROCEDURE — 81003 URINALYSIS AUTO W/O SCOPE: CPT | Performed by: FAMILY MEDICINE

## 2018-11-19 RX ORDER — VERAPAMIL HYDROCHLORIDE 240 MG/1
240 CAPSULE, EXTENDED RELEASE ORAL NIGHTLY
Qty: 90 CAPSULE | Refills: 3 | Status: SHIPPED | OUTPATIENT
Start: 2018-11-19 | End: 2020-02-17

## 2018-11-19 RX ORDER — CARVEDILOL 12.5 MG/1
12.5 TABLET ORAL 2 TIMES DAILY WITH MEALS
Qty: 180 TABLET | Refills: 3 | Status: SHIPPED | OUTPATIENT
Start: 2018-11-19 | End: 2020-06-29

## 2018-11-19 NOTE — PROGRESS NOTES
HPI:   Patient presents with:  Blood Pressure  Diabetes  Testing: due for mammo, colonoscopy, eye exam- Pt states had eye exam 9/2018- will sent letter to eye doc  Urinary Frequency: x 1 week    Geetha Antonio is a 80year old female who presents fo well-nourished. No distress. HENT:   Head: Normocephalic. Neck: Normal range of motion. Neck supple. Cardiovascular: Normal rate, regular rhythm, S1 normal, S2 normal and normal heart sounds. No murmur heard.   Pulses:       Dorsalis pedis pulses a continue present medications, reviewed diet, exercise and weight control, and labs as ordered        The patient indicates understanding of these issues and agrees to the plan.     Problem List Items Addressed This Visit        Cardiovascular    Hypertensio 15 MG Oral Tab    MetFORMIN HCl 1000 MG Oral Tab    GLIMEPIRIDE 4 MG Oral Tab    Insulin Glargine (TOUJEO SOLOSTAR) 300 UNIT/ML Subcutaneous Solution Pen-injector                   Relevant Orders    COMP METABOLIC PANEL (14)    LIPID PANEL    HEMOGLOBIN A

## 2018-11-20 NOTE — ASSESSMENT & PLAN NOTE
Stable, Continue present management.     Blood Pressure and Cardiac Medications          carvedilol 12.5 MG Oral Tab    Verapamil HCl  MG Oral Capsule SR 24 Hr    BENAZEPRIL HCL 20 MG Oral Tab

## 2018-11-20 NOTE — ASSESSMENT & PLAN NOTE
As for her Diabetes, it is reasonably well controlled, no significant medication side effects noted. Recommendations are: continue present meds, lose weight by increased dietary compliance and exercise and will check labs as ordered.     Lab Results   C

## 2018-11-21 ENCOUNTER — TELEPHONE (OUTPATIENT)
Dept: FAMILY MEDICINE CLINIC | Facility: CLINIC | Age: 68
End: 2018-11-21

## 2018-11-21 RX ORDER — FLUCONAZOLE 150 MG/1
150 TABLET ORAL
Qty: 2 TABLET | Refills: 0 | Status: SHIPPED | OUTPATIENT
Start: 2018-11-21 | End: 2018-11-24

## 2018-11-21 NOTE — TELEPHONE ENCOUNTER
OK, I sent in diflucan to cristin ramirez and Wale Arias. OK to notify.  Thanks, Chandana Cummins MD

## 2018-11-21 NOTE — PROGRESS NOTES
Discussed negative urine culture test results with Gladys Villalobos by phone, giving her  comments. Patient verbalizes understanding, but still feeling discomfort and experiencing frequency.  Will discuss with Alona Cavanaugh

## 2018-11-21 NOTE — TELEPHONE ENCOUNTER
----- Message from Miguel Shanks MD sent at 11/20/2018  8:12 PM CST -----  No infection, ok to notify, no abx needed

## 2018-11-21 NOTE — TELEPHONE ENCOUNTER
Discussed Negative Urine Culture results with Joe Jose. She is still experiencing frequency, itching in the perineal area and internally.   She had been on jardiance and is wondering if she could possibly have a yeast infecion    For your review  Routed t

## 2018-11-26 ENCOUNTER — TELEPHONE (OUTPATIENT)
Dept: FAMILY MEDICINE CLINIC | Facility: CLINIC | Age: 68
End: 2018-11-26

## 2018-11-26 RX ORDER — CEFUROXIME AXETIL 250 MG/1
250 TABLET ORAL 2 TIMES DAILY
Qty: 20 TABLET | Refills: 0 | Status: SHIPPED | OUTPATIENT
Start: 2018-11-26 | End: 2018-12-06

## 2018-11-26 NOTE — TELEPHONE ENCOUNTER
Pt called to report that she started getting ill on 11/24/18 with sinus congestion, green mucus, coughing fits, and feeling \" warm\". Pt has no way to get to clinic. Pt went out to [employer] event yesterday, and symptoms became worse. Pt asked if Dr Jennifer Rome

## 2018-11-26 NOTE — TELEPHONE ENCOUNTER
Please notify:    Requested Prescriptions     Signed Prescriptions Disp Refills   • Cefuroxime Axetil (CEFTIN) 250 MG Oral Tab 20 tablet 0     Sig: Take 1 tablet (250 mg total) by mouth 2 (two) times daily for 10 days.      Authorizing Provider: Venkat Molina

## 2018-11-26 NOTE — TELEPHONE ENCOUNTER
Patient was just seen on 11/19/18 and she has developed she believes is a sinus infection, she has no one to bring her here today to be seen again can the doctor send a script to [employer] for her or let her know what she can do to help her symptoms.

## 2018-11-30 PROCEDURE — 99490 CHRNC CARE MGMT STAFF 1ST 20: CPT

## 2018-12-06 ENCOUNTER — TELEPHONE (OUTPATIENT)
Dept: FAMILY MEDICINE CLINIC | Facility: CLINIC | Age: 68
End: 2018-12-06

## 2018-12-06 NOTE — TELEPHONE ENCOUNTER
TC from patient who was upset said she got a call from insurance company that they will need new letter of medical necessity for CPAP supplies. In checking a recent DMEorder this was started by Dr. Jeffers office.   There are notes inside the referral

## 2018-12-11 ENCOUNTER — PATIENT OUTREACH (OUTPATIENT)
Dept: CASE MANAGEMENT | Age: 68
End: 2018-12-11

## 2019-01-14 ENCOUNTER — PATIENT OUTREACH (OUTPATIENT)
Dept: CASE MANAGEMENT | Age: 69
End: 2019-01-14

## 2019-01-28 ENCOUNTER — PATIENT OUTREACH (OUTPATIENT)
Dept: CASE MANAGEMENT | Age: 69
End: 2019-01-28

## 2019-02-08 ENCOUNTER — TELEPHONE (OUTPATIENT)
Dept: FAMILY MEDICINE CLINIC | Facility: CLINIC | Age: 69
End: 2019-02-08

## 2019-02-08 DIAGNOSIS — I73.9 PERIPHERAL VASCULAR DISEASE, UNSPECIFIED (HCC): ICD-10-CM

## 2019-02-08 DIAGNOSIS — IMO0001 CHRONIC VENOUS HYPERTENSION WITH ULCER INVOLVING BOTH SIDES: Primary | ICD-10-CM

## 2019-02-08 NOTE — TELEPHONE ENCOUNTER
Called patient, stated she has bilateral oozing blisters, on legs below the knee  She stated \"these are in the same place as previously treated at wound clinic\"

## 2019-02-08 NOTE — TELEPHONE ENCOUNTER
Why does she need to go to wound clinic?  LOV with Dr Carmen Donaldson on 11/19/18 no mention of wound needing attention

## 2019-02-12 ENCOUNTER — HOSPITAL ENCOUNTER (OUTPATIENT)
Dept: LAB | Facility: HOSPITAL | Age: 69
Discharge: HOME | End: 2019-02-12
Attending: NURSE PRACTITIONER
Payer: MEDICARE

## 2019-02-12 ENCOUNTER — OFFICE VISIT (OUTPATIENT)
Dept: WOUND CARE | Facility: HOSPITAL | Age: 69
End: 2019-02-12
Attending: NURSE PRACTITIONER
Payer: MEDICARE

## 2019-02-12 ENCOUNTER — ORDER TRANSCRIPTION (OUTPATIENT)
Dept: WOUND CARE | Facility: HOSPITAL | Age: 69
End: 2019-02-12

## 2019-02-12 DIAGNOSIS — I87.312 CHRONIC VENOUS HYPERTENSION (IDIOPATHIC) WITH ULCER OF LEFT LOWER EXTREMITY (HCC): Primary | ICD-10-CM

## 2019-02-12 DIAGNOSIS — S81.802A OPEN WOUND OF BOTH LOWER EXTREMITIES WITH COMPLICATION, INITIAL ENCOUNTER: ICD-10-CM

## 2019-02-12 DIAGNOSIS — S81.802A OPEN WOUND OF BOTH LOWER EXTREMITIES WITH COMPLICATION, INITIAL ENCOUNTER: Primary | ICD-10-CM

## 2019-02-12 DIAGNOSIS — E11.40 DIABETIC NEUROPATHY (HCC): ICD-10-CM

## 2019-02-12 DIAGNOSIS — S81.801A OPEN WOUND OF BOTH LOWER EXTREMITIES WITH COMPLICATION, INITIAL ENCOUNTER: Primary | ICD-10-CM

## 2019-02-12 DIAGNOSIS — S81.801A OPEN WOUND OF BOTH LOWER EXTREMITIES WITH COMPLICATION, INITIAL ENCOUNTER: ICD-10-CM

## 2019-02-12 DIAGNOSIS — IMO0001 CHRONIC VENOUS HYPERTENSION WITH ULCER INVOLVING LEFT SIDE: ICD-10-CM

## 2019-02-12 DIAGNOSIS — L97.929 CHRONIC VENOUS HYPERTENSION (IDIOPATHIC) WITH ULCER OF LEFT LOWER EXTREMITY (HCC): Primary | ICD-10-CM

## 2019-02-12 DIAGNOSIS — I73.9 PERIPHERAL VASCULAR DISEASE, UNSPECIFIED (HCC): ICD-10-CM

## 2019-02-12 LAB
ALBUMIN SERPL-MCNC: 3.3 G/DL (ref 3.4–5)
ALBUMIN/GLOB SERPL: 0.8 {RATIO} (ref 1–2)
ALP LIVER SERPL-CCNC: 100 U/L (ref 55–142)
ALT SERPL-CCNC: 17 U/L (ref 13–56)
ANION GAP SERPL CALC-SCNC: 8 MMOL/L (ref 0–18)
AST SERPL-CCNC: 13 U/L (ref 15–37)
BASOPHILS # BLD AUTO: 0.03 X10(3) UL (ref 0–0.2)
BASOPHILS NFR BLD AUTO: 0.6 %
BILIRUB SERPL-MCNC: 0.6 MG/DL (ref 0.1–2)
BUN BLD-MCNC: 11 MG/DL (ref 7–18)
BUN/CREAT SERPL: 16.2 (ref 10–20)
CALCIUM BLD-MCNC: 8.9 MG/DL (ref 8.5–10.1)
CHLORIDE SERPL-SCNC: 106 MMOL/L (ref 98–107)
CO2 SERPL-SCNC: 25 MMOL/L (ref 21–32)
CREAT BLD-MCNC: 0.68 MG/DL (ref 0.55–1.02)
DEPRECATED RDW RBC AUTO: 41.1 FL (ref 35.1–46.3)
EOSINOPHIL # BLD AUTO: 0.17 X10(3) UL (ref 0–0.7)
EOSINOPHIL NFR BLD AUTO: 3.4 %
ERYTHROCYTE [DISTWIDTH] IN BLOOD BY AUTOMATED COUNT: 12.4 % (ref 11–15)
EST. AVERAGE GLUCOSE BLD GHB EST-MCNC: 203 MG/DL (ref 68–126)
GLOBULIN PLAS-MCNC: 4.3 G/DL (ref 2.8–4.4)
GLUCOSE BLD-MCNC: 171 MG/DL (ref 70–99)
HBA1C MFR BLD HPLC: 8.7 % (ref ?–5.7)
HCT VFR BLD AUTO: 38.3 % (ref 35–48)
HGB BLD-MCNC: 12.6 G/DL (ref 12–16)
IMM GRANULOCYTES # BLD AUTO: 0.01 X10(3) UL (ref 0–1)
IMM GRANULOCYTES NFR BLD: 0.2 %
LYMPHOCYTES # BLD AUTO: 1.35 X10(3) UL (ref 1–4)
LYMPHOCYTES NFR BLD AUTO: 26.9 %
M PROTEIN MFR SERPL ELPH: 7.6 G/DL (ref 6.4–8.2)
MCH RBC QN AUTO: 30 PG (ref 26–34)
MCHC RBC AUTO-ENTMCNC: 32.9 G/DL (ref 31–37)
MCV RBC AUTO: 91.2 FL (ref 80–100)
MONOCYTES # BLD AUTO: 0.4 X10(3) UL (ref 0.1–1)
MONOCYTES NFR BLD AUTO: 8 %
NEUTROPHILS # BLD AUTO: 3.05 X10 (3) UL (ref 1.5–7.7)
NEUTROPHILS # BLD AUTO: 3.05 X10(3) UL (ref 1.5–7.7)
NEUTROPHILS NFR BLD AUTO: 60.9 %
OSMOLALITY SERPL CALC.SUM OF ELEC: 291 MOSM/KG (ref 275–295)
PLATELET # BLD AUTO: 202 10(3)UL (ref 150–450)
POTASSIUM SERPL-SCNC: 3.9 MMOL/L (ref 3.5–5.1)
PREALB SERPL-MCNC: 19.8 MG/DL (ref 20–40)
RBC # BLD AUTO: 4.2 X10(6)UL (ref 3.8–5.3)
SODIUM SERPL-SCNC: 139 MMOL/L (ref 136–145)
WBC # BLD AUTO: 5 X10(3) UL (ref 4–11)

## 2019-02-12 PROCEDURE — 84134 ASSAY OF PREALBUMIN: CPT

## 2019-02-12 PROCEDURE — 99214 OFFICE O/P EST MOD 30 MIN: CPT

## 2019-02-12 PROCEDURE — 80053 COMPREHEN METABOLIC PANEL: CPT

## 2019-02-12 PROCEDURE — 36415 COLL VENOUS BLD VENIPUNCTURE: CPT

## 2019-02-12 PROCEDURE — 29581 APPL MULTLAYER CMPRN SYS LEG: CPT

## 2019-02-12 PROCEDURE — 85025 COMPLETE CBC W/AUTO DIFF WBC: CPT

## 2019-02-12 PROCEDURE — 83036 HEMOGLOBIN GLYCOSYLATED A1C: CPT

## 2019-02-12 NOTE — PROGRESS NOTES
Progress Note Details  Patient Name: Marisa Bosch Date: 2/12/2019   Patient Number: [id number] Physician / Arnaldo Bank: Yulisa Roblero   Patient YOB: 1950 Facility: [facility]    Chief Complaint  This information was ob Discussed about venous reflux study as she stated that she has not had any done in the past and does not want any done at present. Only arterial US was done and seen by [facility] in 4/2018 and CTA was done-PAD and small vessel disease.  Pt never followed up breast surgery (left lumpectomy)    forearm/wrist surgery  angioplasty  Cholecystectomy    Complaints and Symptoms  This information was obtained from the patient  Patient complains of:   Respiratory: Other (has sleep apnea )  Cardiovascular (Cent benazepril - oral 20 mg tablet  verapamil - oral 240 mg tablet extended release  insulin glargine - subcutaneous 300 unit/mL (3 mL) insulin pen  furosemide - oral 20 mg tablet  Adults Multivitamin - oral 18 mg iron mcg-25 mcg-400 mcg-25 mcg tablet  acetami Wound #7 Left Lower Leg is an acute Full Thickness Venous Ulcer and has received a status of Not Healed. Initial wound encounter measurements are 10cm length x 22.5cm width x 0.1cm depth, with an area of 225 sq cm and a volume of 22.5 cubic cm.  No tunnelin Wound #9 Right, Anterior Lower Leg is an acute Full Thickness Venous Ulcer and has received a status of Not Healed. Initial wound encounter measurements are 0.2cm length x 1cm width with no measurable depth, with an area of 0.2 sq cm .  No tunneling has bee Compression Device In Use: No   Calf Measurement 31 cm from heel with right measurement of 46.4 cm   Ankle Measurement 10 cm from heel with right measurement of 29.9 cm  Vascular Assessment:  Left Extremity Pulses:   Posterior Tibial: Doppler   Dorsalis Pe Procedures    Wound #7  Wound #7 (Venous Ulcer) is located on the left lower leg. A Multi-Layer Compression Wrap procedure was performed. A 2 Layers Unna Boot was applied with moderate 15-25 mmhg. The procedure was tolerated well.    General Notes:  coflex Cleanse with saline or wound cleanser  Barrier ointment/paste/cream - Zinc oxide to macerated areas  Silver alginate  Kerramax/Super absorbent  Kerlix  Paper tape  Moisturizer to surrounding skin.  - AMLACTIN  Change dressing 3x/week     Wound #10 Left, Lat CMP (Prescription for ICD-10 S81.802A - Unspecified open wound, left lower leg, initial encounter), CBC w/differential (Prescription for ICD-10 S81.802A - Unspecified open wound, left lower leg, initial encounter), Hemoglobin A1C (Prescription for ICD-10 E

## 2019-02-13 ENCOUNTER — PATIENT OUTREACH (OUTPATIENT)
Dept: CASE MANAGEMENT | Age: 69
End: 2019-02-13

## 2019-02-13 DIAGNOSIS — F32.1 MODERATE SINGLE CURRENT EPISODE OF MAJOR DEPRESSIVE DISORDER (HCC): ICD-10-CM

## 2019-02-13 DIAGNOSIS — I77.9 BILATERAL CAROTID ARTERY DISEASE, UNSPECIFIED TYPE (HCC): ICD-10-CM

## 2019-02-13 DIAGNOSIS — C50.312 MALIGNANT NEOPLASM OF LOWER-INNER QUADRANT OF LEFT FEMALE BREAST, UNSPECIFIED ESTROGEN RECEPTOR STATUS (HCC): Chronic | ICD-10-CM

## 2019-02-13 DIAGNOSIS — E11.40 TYPE 2 DIABETES MELLITUS WITH DIABETIC NEUROPATHY, WITH LONG-TERM CURRENT USE OF INSULIN (HCC): ICD-10-CM

## 2019-02-13 DIAGNOSIS — I25.10 CAD, MULTIPLE VESSEL: Chronic | ICD-10-CM

## 2019-02-13 DIAGNOSIS — Z79.4 TYPE 2 DIABETES MELLITUS WITH DIABETIC NEUROPATHY, WITH LONG-TERM CURRENT USE OF INSULIN (HCC): ICD-10-CM

## 2019-02-13 DIAGNOSIS — I10 HYPERTENSION GOAL BP (BLOOD PRESSURE) < 130/80: Chronic | ICD-10-CM

## 2019-02-13 DIAGNOSIS — IMO0001 CHRONIC VENOUS HYPERTENSION WITH ULCER INVOLVING LEFT SIDE: ICD-10-CM

## 2019-02-13 DIAGNOSIS — M81.6 LOCALIZED OSTEOPOROSIS WITHOUT CURRENT PATHOLOGICAL FRACTURE: ICD-10-CM

## 2019-02-13 DIAGNOSIS — E78.5 HYPERLIPIDEMIA LDL GOAL <100: Chronic | ICD-10-CM

## 2019-02-13 NOTE — PROGRESS NOTES
Attempted to contact pt left detailed message with  Edin Zavala for pt to call back. Will follow up in a couple weeks. Care plan: Reviewed. Needs AWV scheduled. All other health maintenance due Colonoscopy and Dexa.    Time spent: 5 min collecting, rev

## 2019-02-14 ENCOUNTER — OFFICE VISIT (OUTPATIENT)
Dept: WOUND CARE | Facility: HOSPITAL | Age: 69
End: 2019-02-14
Attending: NURSE PRACTITIONER
Payer: MEDICARE

## 2019-02-14 DIAGNOSIS — I87.312 CHRONIC VENOUS HYPERTENSION (IDIOPATHIC) WITH ULCER OF LEFT LOWER EXTREMITY (HCC): ICD-10-CM

## 2019-02-14 DIAGNOSIS — L97.929 CHRONIC VENOUS HYPERTENSION (IDIOPATHIC) WITH ULCER OF LEFT LOWER EXTREMITY (HCC): ICD-10-CM

## 2019-02-14 DIAGNOSIS — S81.801A OPEN WOUND OF BOTH LOWER EXTREMITIES WITH COMPLICATION, INITIAL ENCOUNTER: Primary | ICD-10-CM

## 2019-02-14 DIAGNOSIS — E11.40 DIABETIC NEUROPATHY (HCC): ICD-10-CM

## 2019-02-14 DIAGNOSIS — S81.802A OPEN WOUND OF BOTH LOWER EXTREMITIES WITH COMPLICATION, INITIAL ENCOUNTER: Primary | ICD-10-CM

## 2019-02-14 PROCEDURE — 29581 APPL MULTLAYER CMPRN SYS LEG: CPT

## 2019-02-19 ENCOUNTER — OFFICE VISIT (OUTPATIENT)
Dept: WOUND CARE | Facility: HOSPITAL | Age: 69
End: 2019-02-19
Attending: NURSE PRACTITIONER
Payer: MEDICARE

## 2019-02-19 DIAGNOSIS — I73.9 PERIPHERAL VASCULAR DISEASE, UNSPECIFIED (HCC): ICD-10-CM

## 2019-02-19 DIAGNOSIS — IMO0001 CHRONIC VENOUS HYPERTENSION WITH ULCER INVOLVING LEFT SIDE: ICD-10-CM

## 2019-02-19 DIAGNOSIS — I87.312 CHRONIC VENOUS HYPERTENSION (IDIOPATHIC) WITH ULCER OF LEFT LOWER EXTREMITY (HCC): Primary | ICD-10-CM

## 2019-02-19 DIAGNOSIS — L97.929 CHRONIC VENOUS HYPERTENSION (IDIOPATHIC) WITH ULCER OF LEFT LOWER EXTREMITY (HCC): Primary | ICD-10-CM

## 2019-02-19 PROCEDURE — 29581 APPL MULTLAYER CMPRN SYS LEG: CPT

## 2019-02-19 PROCEDURE — 97597 DBRDMT OPN WND 1ST 20 CM/<: CPT

## 2019-02-19 NOTE — PROGRESS NOTES
Progress Note Details  Patient Name: Yaya Cunha Date: 2/19/2019   Patient Number: [id number] Physician / Sabiha Livingston: Patricia Damon   Patient YOB: 1950 Facility: [facility]    Chief Complaint  This information was ob Discussed about venous reflux study as she stated that she has not had any done in the past and does not want any done at present. Only arterial US was done and seen by [facility] in 4/2018 and CTA was done-PAD and small vessel disease.  Pt never followed up 1/29/18: Pt returns for follow up, has been taking Cipro for wound infection and tolerating well. She was in the ER on Saturday because wound was bleeding heavily and home health RN was unable to get it to stop.  Since then states she has had some bleeding 4/16/18: Pt here for follow up, wound continues to heal well. Pt has not ordered her compression stockings yet as the company asked her if she would like to wait until wounds are healed to order. Pt was instructed to order stockings now.  She also states sh 6/25/18: Pt has compression stocking on right leg today, has been more adherent with use.     Review of Systems (ROS)  This information was obtained from the patient    Complaints and Symptoms  Patient complains of:   Respiratory: Other (has sleep apnea ) BP Elevated, on antihypertensive meds. . Pulse RRR. RR within normal limits. Afebrile. Obesity. Alert, calm, well developed, in no apparent distress.  Height/Length: 64 in (162.56 cm), Weight: 180 lbs (81.82 kgs), BMI: 30.9, Temperature: 97.9 °F (36.61 °C), Wound #9 Right, Anterior Lower Leg is an acute Full Thickness Venous Ulcer and has received a status of Not Healed.  Subsequent wound encounter measurements are 0.5cm length x 0.5cm width x 0.1cm depth, with an area of 0.25 sq cm and a volume of 0.025 cubic Calf Measurement 31 cm from heel with left measurement of 43.4 cm   Ankle Measurement 10 cm from heel with left measurement of 30 cm  Right Extremity: Edema is present   Compression Device In Use: Yes   Device Used Correctly: Yes   Compression Device Used: (Encounter Diagnosis) I25.10 - Atherosclerotic heart disease of native coronary artery without angina pectoris        Procedures    Wound #7  Wound #7 (Venous Ulcer) is located on the left lower leg. A selective debridement with a total area debrided of 0. Cleanse with saline or wound cleanser  Silver alginate  Kerramax/Super absorbent  Kerlix  Paper tape  Moisturizer to surrounding skin.  - AMLACTIN  Change dressing every: - Tuesday    Topicals:  Initial Anesthetic Order: Apply lidocaine to wound bed on all Perfusion assessed by doppler. - Biphasic signals at the dp/pt bilateral  Perfusion assessed by palpation of pulses.  - 2/19/19 +1 bilateral DP and PT  Last sharp debridement date: - 2/19/19  Last A1C date and value: - 2/12/19 8.7-pt will schedule appointme

## 2019-02-25 ENCOUNTER — PATIENT OUTREACH (OUTPATIENT)
Dept: CASE MANAGEMENT | Age: 69
End: 2019-02-25

## 2019-02-26 ENCOUNTER — TELEPHONE (OUTPATIENT)
Dept: FAMILY MEDICINE CLINIC | Facility: CLINIC | Age: 69
End: 2019-02-26

## 2019-02-26 ENCOUNTER — OFFICE VISIT (OUTPATIENT)
Dept: WOUND CARE | Facility: HOSPITAL | Age: 69
End: 2019-02-26
Attending: NURSE PRACTITIONER
Payer: MEDICARE

## 2019-02-26 DIAGNOSIS — L97.929 CHRONIC VENOUS HYPERTENSION (IDIOPATHIC) WITH ULCER OF LEFT LOWER EXTREMITY (HCC): Primary | ICD-10-CM

## 2019-02-26 DIAGNOSIS — IMO0001 CHRONIC VENOUS HYPERTENSION WITH ULCER INVOLVING LEFT SIDE: ICD-10-CM

## 2019-02-26 DIAGNOSIS — I87.312 CHRONIC VENOUS HYPERTENSION (IDIOPATHIC) WITH ULCER OF LEFT LOWER EXTREMITY (HCC): Primary | ICD-10-CM

## 2019-02-26 DIAGNOSIS — I73.9 PERIPHERAL VASCULAR DISEASE, UNSPECIFIED (HCC): ICD-10-CM

## 2019-02-26 PROCEDURE — 29581 APPL MULTLAYER CMPRN SYS LEG: CPT

## 2019-02-26 PROCEDURE — 97597 DBRDMT OPN WND 1ST 20 CM/<: CPT

## 2019-02-26 NOTE — TELEPHONE ENCOUNTER
Patient is calling she has had loose stools for 4-5 days and she has a colonoscopy scheduled Tuesday 3/5/19 she is concerned. Please call.

## 2019-02-26 NOTE — TELEPHONE ENCOUNTER
Patient just called GI - waiting for call back. Suggest BRAT diet and appt if no improvement. Unsure what GI will advise regarding proceeding with scheduled colonoscopy next week. Patient will wait to hear back for their directives.

## 2019-02-26 NOTE — PROGRESS NOTES
Progress Note Details  Patient Name: [place] Date: 2/26/2019   Patient Number: [id number] Physician / Jonathan Milian: Katy Merlos   Patient YOB: 1950 Facility: [facility]    Chief Complaint  This information was ob Discussed about venous reflux study as she stated that she has not had any done in the past and does not want any done at present. Only arterial US was done and seen by [facility] in 4/2018 and CTA was done-PAD and small vessel disease.  Pt never followed up 1-23-18 I am covering provider for usual provider. patient presents today with spouse, significant malodor and copious green drainage.  patient c/o severe pain from using a \"spray\" antifungal. she presents with only the unna wrap on-no coban over it. [facility] 3/19/18: Pt is here for follow up. States she walked a lot this weekend at [employer]. She did keep her compression on but developed a few new blisters as a results.      3/26/18: Pt is here for follow up, nothing new to report, has been tolerating wraps wel 6/11/18: Pt is here for follow up of left leg wound. Leg is more swollen this visit as it has been 1 week since dressing change. Pt is frustrated with the slow healing of this wound. 6/18/18: Wound is improving this visit.  Pt removed compression stocki Hematologic/Lymphatic: Bleeding / Clotting Disorders  Psychiatric: Anxiety, Depression  Prior Wound History: Bleeding, Erythema, Malodor, Pain    Additional Information  Medication reconciliation completed at today's visit. : Yes        Objective    Consti Wound #9 Right, Anterior Lower Leg is an acute Full Thickness Venous Ulcer and has received a status of Not Healed. Subsequent wound encounter measurements are 0.9cm length x 0.8cm width with no measurable depth, with an area of 0.72 sq cm .  No tunneling h Right Extremity: Edema is present   Compression Device In Use: Yes   Device Used Correctly: Yes   Compression Device Used: Multi-Layer Wrap   Calf Measurement 31 cm from heel with right measurement of 41 cm   Ankle Measurement 10 cm from heel with right me Wound #7 (Venous Ulcer) is located on the left lower leg. A Multi-Layer Compression Wrap procedure was performed. A 2 Layers Unna Boot was applied with moderate 15-25 mmhg. The procedure was tolerated well.    General Notes:  coflex unna boot    Wound #9  W Wound #10 Left, Lateral Foot     Topicals:  Initial Anesthetic Order: Apply lidocaine to wound bed on all future wound center visits during preparation for physician exam if wound bed contains fibrin or eschar.   4% Topical Lidocaine    Wound Cleansing & Dr Last albumin date and value: - 2/12/19 3.3 low and 7.6wnl; Prealbumin low at 19.8-protein supplement options discussed  3/26/18: 3.5        Wound progressing well . on. Will continue treatment plan. No s/s of infection. Will continue treatment plan.  Carmen Aviles

## 2019-02-26 NOTE — TELEPHONE ENCOUNTER
Called LMOM to call back to discuss how many stools a day she is having and did she contact the GI to ask them about this?

## 2019-02-26 NOTE — PROGRESS NOTES
Attempted to contact pt no answer left detailed message for pt to call back.   Total time spent with patient including chart review: 2  Time spent with patient this month: 22    Total time spent with communication and chart review this month to date: 25

## 2019-02-28 ENCOUNTER — HOSPITAL ENCOUNTER (OUTPATIENT)
Dept: MAMMOGRAPHY | Facility: HOSPITAL | Age: 69
Discharge: HOME | End: 2019-02-28
Attending: INTERNAL MEDICINE
Payer: MEDICARE

## 2019-02-28 DIAGNOSIS — Z12.31 ENCOUNTER FOR SCREENING MAMMOGRAM FOR MALIGNANT NEOPLASM OF BREAST: ICD-10-CM

## 2019-02-28 PROCEDURE — 77063 BREAST TOMOSYNTHESIS BI: CPT | Performed by: INTERNAL MEDICINE

## 2019-02-28 PROCEDURE — 99490 CHRNC CARE MGMT STAFF 1ST 20: CPT

## 2019-02-28 PROCEDURE — 77067 SCR MAMMO BI INCL CAD: CPT | Performed by: INTERNAL MEDICINE

## 2019-03-01 ENCOUNTER — OFFICE VISIT (OUTPATIENT)
Dept: WOUND CARE | Facility: HOSPITAL | Age: 69
End: 2019-03-01
Attending: NURSE PRACTITIONER
Payer: MEDICARE

## 2019-03-01 DIAGNOSIS — IMO0001 CHRONIC VENOUS HYPERTENSION WITH ULCER INVOLVING LEFT SIDE: ICD-10-CM

## 2019-03-01 DIAGNOSIS — L97.929 CHRONIC VENOUS HYPERTENSION (IDIOPATHIC) WITH ULCER OF LEFT LOWER EXTREMITY (HCC): Primary | ICD-10-CM

## 2019-03-01 DIAGNOSIS — I87.312 CHRONIC VENOUS HYPERTENSION (IDIOPATHIC) WITH ULCER OF LEFT LOWER EXTREMITY (HCC): Primary | ICD-10-CM

## 2019-03-01 PROCEDURE — 29581 APPL MULTLAYER CMPRN SYS LEG: CPT

## 2019-03-05 ENCOUNTER — HOSPITAL ENCOUNTER (OUTPATIENT)
Facility: HOSPITAL | Age: 69
Setting detail: HOSPITAL OUTPATIENT SURGERY
Discharge: HOME | End: 2019-03-05
Attending: INTERNAL MEDICINE | Admitting: INTERNAL MEDICINE
Payer: MEDICARE

## 2019-03-05 ENCOUNTER — ANESTHESIA (OUTPATIENT)
Dept: ENDOSCOPY | Facility: HOSPITAL | Age: 69
End: 2019-03-05

## 2019-03-05 ENCOUNTER — ANESTHESIA EVENT (OUTPATIENT)
Dept: ENDOSCOPY | Facility: HOSPITAL | Age: 69
End: 2019-03-05

## 2019-03-05 VITALS
DIASTOLIC BLOOD PRESSURE: 82 MMHG | BODY MASS INDEX: 31.2 KG/M2 | HEART RATE: 74 BPM | WEIGHT: 185 LBS | TEMPERATURE: 98 F | RESPIRATION RATE: 18 BRPM | OXYGEN SATURATION: 79 % | SYSTOLIC BLOOD PRESSURE: 174 MMHG | HEIGHT: 64.5 IN

## 2019-03-05 DIAGNOSIS — Z86.010 PERSONAL HISTORY OF COLONIC POLYPS: ICD-10-CM

## 2019-03-05 LAB — GLUCOSE BLD-MCNC: 183 MG/DL (ref 70–99)

## 2019-03-05 PROCEDURE — 0DBE8ZX EXCISION OF LARGE INTESTINE, VIA NATURAL OR ARTIFICIAL OPENING ENDOSCOPIC, DIAGNOSTIC: ICD-10-PCS | Performed by: INTERNAL MEDICINE

## 2019-03-05 PROCEDURE — 82962 GLUCOSE BLOOD TEST: CPT

## 2019-03-05 PROCEDURE — 88305 TISSUE EXAM BY PATHOLOGIST: CPT | Performed by: INTERNAL MEDICINE

## 2019-03-05 RX ORDER — ACETAMINOPHEN 500 MG
1000 TABLET ORAL ONCE
Status: DISCONTINUED | OUTPATIENT
Start: 2019-03-05 | End: 2019-03-05

## 2019-03-05 RX ORDER — SODIUM CHLORIDE, SODIUM LACTATE, POTASSIUM CHLORIDE, CALCIUM CHLORIDE 600; 310; 30; 20 MG/100ML; MG/100ML; MG/100ML; MG/100ML
INJECTION, SOLUTION INTRAVENOUS CONTINUOUS
Status: DISCONTINUED | OUTPATIENT
Start: 2019-03-05 | End: 2019-03-05

## 2019-03-05 RX ORDER — DEXTROSE MONOHYDRATE 25 G/50ML
50 INJECTION, SOLUTION INTRAVENOUS
Status: DISCONTINUED | OUTPATIENT
Start: 2019-03-05 | End: 2019-03-05

## 2019-03-05 NOTE — ANESTHESIA POSTPROCEDURE EVALUATION
[facility]. Patient Status:  Hospital Outpatient Surgery   Age/Gender 80year old female MRN [id number]   Location [facility]. Attending Mirian Cheng MD   Hosp Day # 0 PCP Ayush Baeza MD       Anesthesia Pos

## 2019-03-05 NOTE — H&P
[address] Patient Status:  Hospital Outpatient Surgery    1950 MRN [id number]   Location [address] [place] [address] Attending Asif Rodríguez MD   Date 3/5/2019 PCP Sheldon Sandra MD     CC:  H/o po SURGERY PROCEDURE UNLISTED  08   •   ,    • CATARACT     • CATARACT EXTRACTION Left    • CATH DRUG ELUTING STENT     • CATH PERCUTANEOUS  TRANSLUMINAL CORONARY ANGIOPLASTY  05   • CHEMOTHERAPY     • CHOLECYSTECTOMY      laparos temperature source Oral, resp. rate 18, height 64.5\", weight 185 lb, SpO2 95 %, not currently breastfeeding. General: Appears alert, oriented x3 and in no acute distress. HEENT: Normal.  CV: S1 and S2 normal.    Lungs: Clear to auscultation.   Abdomen:

## 2019-03-05 NOTE — ANESTHESIA PREPROCEDURE EVALUATION
PRE-OP EVALUATION    Patient Name: David Toney    Pre-op Diagnosis: Personal history of colonic polyps [Z86.010]    Procedure(s):  colonoscopy    Surgeon(s) and Role:     Drew Peace MD - Primary    Pre-op vitals reviewed.   Temp: 98.2 °F week. Disp: 30 capsule Rfl: prn   Vitamin C 500 MG Oral Tab Take 500 mg by mouth daily. Disp:  Rfl:    Insulin Glargine (TOUJEO SOLOSTAR) 300 UNIT/ML Subcutaneous Solution Pen-injector Inject 20 Units into the skin daily.  (Patient taking differently: Injec Smokeless tobacco: Never Used    Alcohol use: No      Frequency: Never      Drug use: No     Available pre-op labs reviewed.   Lab Results   Component Value Date    WBC 5.0 02/12/2019    RBC 4.20 02/12/2019    HGB 12.6 02/12/2019    HCT 38.3 02/12/2019    M

## 2019-03-05 NOTE — OPERATIVE REPORT
[address] Patient Status:  Hospital Outpatient Surgery    1950 MRN [id number]   [facility] ENDOSCOPY Attending Jony Zarco MD   Date 3/5/2019 PCP Asia Guillen MD     PREOPERATIVE DIAGNOSIS/INDICATION: Diarrhea and

## 2019-03-11 ENCOUNTER — TELEPHONE (OUTPATIENT)
Dept: FAMILY MEDICINE CLINIC | Facility: CLINIC | Age: 69
End: 2019-03-11

## 2019-03-12 ENCOUNTER — OFFICE VISIT (OUTPATIENT)
Dept: WOUND CARE | Facility: HOSPITAL | Age: 69
End: 2019-03-12
Attending: NURSE PRACTITIONER
Payer: MEDICARE

## 2019-03-12 ENCOUNTER — PATIENT OUTREACH (OUTPATIENT)
Dept: CASE MANAGEMENT | Age: 69
End: 2019-03-12

## 2019-03-12 DIAGNOSIS — L97.929 CHRONIC VENOUS HYPERTENSION (IDIOPATHIC) WITH ULCER OF LEFT LOWER EXTREMITY (HCC): Primary | ICD-10-CM

## 2019-03-12 DIAGNOSIS — IMO0001 CHRONIC VENOUS HYPERTENSION WITH ULCER INVOLVING LEFT SIDE: ICD-10-CM

## 2019-03-12 DIAGNOSIS — I73.9 PERIPHERAL VASCULAR DISEASE, UNSPECIFIED (HCC): ICD-10-CM

## 2019-03-12 DIAGNOSIS — I87.312 CHRONIC VENOUS HYPERTENSION (IDIOPATHIC) WITH ULCER OF LEFT LOWER EXTREMITY (HCC): Primary | ICD-10-CM

## 2019-03-12 PROCEDURE — 99213 OFFICE O/P EST LOW 20 MIN: CPT

## 2019-03-12 NOTE — PROGRESS NOTES
Progress Note Details  Patient Name: Singh Dee Date: 3/12/2019   Patient Number: [id number] Physician / Thomas Obrien: Bhumika Case   Patient YOB: 1950 [facility]    Chief Complaint  This information was ob Discussed about venous reflux study as she stated that she has not had any done in the past and does not want any done at present. Only arterial US was done and seen by [facility] in 4/2018 and CTA was done-PAD and small vessel disease.  Pt never followed up 1/15/18: Pt is here for follow up of leg wound. States dressings are sticking too much with dressing change and is very painful. Patient wants the silver contact layer instead. 1-23-18 I am covering provider for usual provider.  patient presents today w 3/12/18: Pt states she has been doing well with dressing changes and wound. She is having less pain. In addition her endocrinologist decreased her dose of Actos to try and reduce her leg swelling. 3/19/18: Pt is here for follow up.  States she walked a 5/14/18: Pt had Jobst 30-40 mm Hg stockings on right leg. Once stocking was removed patient noted bruising and blister to front of leg. Has not worn stocking since.  With her insurance was only able to go [place] [facility] and they did not have [facility] 2 layer stoc Cardiovascular (Central/Peripheral): Chest Pain, Dyspnea on Exertion, Intermittent Claudication, Lower extremity (leg) resting pain  Gastrointestinal (GI): Bowel Incontinence, Change in Bowel Habits, Nausea / Vomiting / Diarrhea (N/V/D), Loss of Appetite, Wound #10 Left, Lateral Foot is an acute [place] Grade 1 Diabetic Ulcer and has received a status of Not Healed. Subsequent wound encounter measurements are 0.7cm length x 0.3cm width x 0.1cm depth, with an area of 0.21 sq cm and a volume of 0.021 cubic cm. Hair Growth on Extremity: No   Temperature of Extremity: Warm   Capillary Refill: < 3 seconds   Erythema: No   Dependent Rubor: No   Hyperpigmentation: Yes   Lipodermatosclerosis: No          Assessment    Active Problems    ICD-10  (Encounter Diagnosis) L Increase dietary protein - Focus on increasing protein in your diet as albumin levels are low  Decrease salt intake  Moisturizer - AMLACTIN can be purchased over the counter  S/S of Infection  Non-adherence    Care summary  Reviewed and evaluated labs.  - 2

## 2019-03-13 PROBLEM — B35.1 ONYCHOMYCOSIS: Status: RESOLVED | Noted: 2017-02-02 | Resolved: 2019-03-13

## 2019-03-13 PROBLEM — M47.816 ARTHRITIS OF FACET JOINT OF LUMBAR SPINE: Status: ACTIVE | Noted: 2018-07-20

## 2019-03-13 NOTE — ASSESSMENT & PLAN NOTE
As for her Diabetes, it is borderline controlled. Hyperglycemia,     Recommendations are: continue present meds, lose weight by increased dietary compliance and exercise and will check labs as ordered. will follow wup with Dr Gris Napoles for Endo management    Lab

## 2019-03-14 ENCOUNTER — OFFICE VISIT (OUTPATIENT)
Dept: FAMILY MEDICINE CLINIC | Facility: CLINIC | Age: 69
End: 2019-03-14
Payer: MEDICARE

## 2019-03-14 VITALS
HEART RATE: 76 BPM | RESPIRATION RATE: 14 BRPM | WEIGHT: 193 LBS | HEIGHT: 64.5 IN | SYSTOLIC BLOOD PRESSURE: 130 MMHG | BODY MASS INDEX: 32.55 KG/M2 | DIASTOLIC BLOOD PRESSURE: 80 MMHG | TEMPERATURE: 98 F

## 2019-03-14 DIAGNOSIS — C50.312 MALIGNANT NEOPLASM OF LOWER-INNER QUADRANT OF LEFT FEMALE BREAST, UNSPECIFIED ESTROGEN RECEPTOR STATUS (HCC): Chronic | ICD-10-CM

## 2019-03-14 DIAGNOSIS — F32.1 MODERATE SINGLE CURRENT EPISODE OF MAJOR DEPRESSIVE DISORDER (HCC): ICD-10-CM

## 2019-03-14 DIAGNOSIS — R53.83 FATIGUE, UNSPECIFIED TYPE: ICD-10-CM

## 2019-03-14 DIAGNOSIS — Z79.4 TYPE 2 DIABETES MELLITUS WITH DIABETIC NEUROPATHY, WITH LONG-TERM CURRENT USE OF INSULIN (HCC): ICD-10-CM

## 2019-03-14 DIAGNOSIS — I70.0 ATHEROSCLEROSIS OF AORTA (HCC): Chronic | ICD-10-CM

## 2019-03-14 DIAGNOSIS — I25.10 CAD, MULTIPLE VESSEL: Chronic | ICD-10-CM

## 2019-03-14 DIAGNOSIS — E11.319 DIABETIC RETINOPATHY OF BOTH EYES ASSOCIATED WITH TYPE 2 DIABETES MELLITUS, MACULAR EDEMA PRESENCE UNSPECIFIED, UNSPECIFIED RETINOPATHY SEVERITY (HCC): Chronic | ICD-10-CM

## 2019-03-14 DIAGNOSIS — Z23 NEED FOR IMMUNIZATION AGAINST INFLUENZA: ICD-10-CM

## 2019-03-14 DIAGNOSIS — E78.5 HYPERLIPIDEMIA LDL GOAL <100: Chronic | ICD-10-CM

## 2019-03-14 DIAGNOSIS — I77.9 BILATERAL CAROTID ARTERY DISEASE, UNSPECIFIED TYPE (HCC): ICD-10-CM

## 2019-03-14 DIAGNOSIS — S32.040D CLOSED COMPRESSION FRACTURE OF L4 LUMBAR VERTEBRA WITH ROUTINE HEALING, SUBSEQUENT ENCOUNTER: Chronic | ICD-10-CM

## 2019-03-14 DIAGNOSIS — I10 ESSENTIAL HYPERTENSION: ICD-10-CM

## 2019-03-14 DIAGNOSIS — D32.9 BENIGN MENINGIOMA (HCC): ICD-10-CM

## 2019-03-14 DIAGNOSIS — E11.40 TYPE 2 DIABETES MELLITUS WITH DIABETIC NEUROPATHY, WITH LONG-TERM CURRENT USE OF INSULIN (HCC): ICD-10-CM

## 2019-03-14 DIAGNOSIS — K52.9 CHRONIC DIARRHEA: ICD-10-CM

## 2019-03-14 DIAGNOSIS — L97.309: ICD-10-CM

## 2019-03-14 DIAGNOSIS — Z00.00 ANNUAL PHYSICAL EXAM: Primary | ICD-10-CM

## 2019-03-14 DIAGNOSIS — G47.33 OBSTRUCTIVE SLEEP APNEA: Chronic | ICD-10-CM

## 2019-03-14 DIAGNOSIS — I67.2 CEREBRAL ATHEROSCLEROSIS: ICD-10-CM

## 2019-03-14 DIAGNOSIS — M47.816 ARTHRITIS OF FACET JOINT OF LUMBAR SPINE: ICD-10-CM

## 2019-03-14 DIAGNOSIS — L97.929 SKIN ULCER OF LEFT LOWER LEG, UNSPECIFIED ULCER STAGE (HCC): ICD-10-CM

## 2019-03-14 DIAGNOSIS — I63.9 BRAINSTEM INFARCTION (HCC): ICD-10-CM

## 2019-03-14 DIAGNOSIS — M81.6 LOCALIZED OSTEOPOROSIS WITHOUT CURRENT PATHOLOGICAL FRACTURE: ICD-10-CM

## 2019-03-14 DIAGNOSIS — E55.9 VITAMIN D DEFICIENCY: ICD-10-CM

## 2019-03-14 DIAGNOSIS — IMO0001 CHRONIC VENOUS HYPERTENSION WITH ULCER INVOLVING LEFT SIDE: ICD-10-CM

## 2019-03-14 DIAGNOSIS — I69.351 HEMIPARESIS AFFECTING RIGHT SIDE AS LATE EFFECT OF STROKE (HCC): ICD-10-CM

## 2019-03-14 DIAGNOSIS — E87.1 HYPONATREMIA: ICD-10-CM

## 2019-03-14 PROCEDURE — 96160 PT-FOCUSED HLTH RISK ASSMT: CPT | Performed by: FAMILY MEDICINE

## 2019-03-14 PROCEDURE — 99397 PER PM REEVAL EST PAT 65+ YR: CPT | Performed by: FAMILY MEDICINE

## 2019-03-14 PROCEDURE — G0439 PPPS, SUBSEQ VISIT: HCPCS | Performed by: FAMILY MEDICINE

## 2019-03-14 RX ORDER — LOPERAMIDE HYDROCHLORIDE 2 MG/1
1-2 TABLET ORAL 4 TIMES DAILY PRN
Qty: 30 TABLET | Refills: 0 | COMMUNITY
Start: 2019-03-14 | End: 2019-10-07

## 2019-03-14 NOTE — PROGRESS NOTES
HPI:   Clarence Ham is a 80year old female who presents for a MA (Medicare Advantage) [address] [facility] (Once per calendar year). Diarrhea with colonoscopy and still food going through every food. Sx now for 15+ days. 5+ diarrhea.   Her last annual interested in PT.    Depression Screening (PHQ-2/PHQ-9): Over the LAST 2 WEEKS   Little interest or pleasure in doing things (over the last two weeks)?: More than half the days  Feeling down, depressed, or hopeless (over the last two weeks)?: More than half Consulting Physician [phone])  Alexsander Henry ([facility])  Jazmyn Jeffrey DO as Consulting Physician (NEUROLOGY)  Tobin Turner, [address] [place] [address] as [facility] Care Manager  Fareed Diaz MD (ENDOCRINOLOGY)  JOANN Pearson as Registered Nurse (PULMONARY DISEASES) 02/12/2019    HGB 12.6 02/12/2019    .0 02/12/2019        ALLERGIES:   She is allergic to adhesive tape.     CURRENT MEDICATIONS:     Outpatient Medications Marked as Taking for the 3/14/19 encounter (Office Visit) with Shiela Ruiz MD:  metFORMIN H Take 1 tablet by mouth daily.       MEDICAL INFORMATION:   She  has a past medical history of Acute coronary syndrome ([facility] [address] (3/31/2014), Acute, but ill-defined, cerebrovascular disease, Anesthesia complication, Anxiety state, unspecified, Arrhythmia, Arthri paternal uncle, and self; Heart Attack in her father and self; Heart Disease in her father; Hypertension in her mother and self; Stroke in her mother and self; Thyroid Disorder in her mother. SOCIAL HISTORY:   She  reports that  has never smoked.  she has No murmur heard. Pulses:       Dorsalis pedis pulses are 2+ on the right side, and 2+ on the left side. Posterior tibial pulses are 2+ on the right side, and 2+ on the left side. Edema not present.   Pulmonary/Chest: Effort normal and breath so Otrenga managing         Current Assessment & Plan     Stable per CV.           Relevant Medications    metFORMIN HCl 1000 MG Oral Tab    Hyperlipidemia LDL goal <100 (Chronic)    Overview     Atorvastatin 20         Current Assessment & Plan     Stable, Co BS           Current Assessment & Plan     As for her Diabetes, it is borderline controlled. Hyperglycemia,     Recommendations are: continue present meds, lose weight by increased dietary compliance and exercise and will check labs as ordered. will follow brainstem punctate lesion, [facility], and retained mild weakess with gait ataxia         Current Assessment & Plan     Stable function.  Continue present management          Relevant Medications    metFORMIN HCl 1000 MG Oral Tab       Mental Health Oncologic    Malignant neoplasm of female breast [facility]) (Chronic)    Overview     Dr Briggs Backganesh managing, left side         Current Assessment & Plan     Continue to follow with Dr GLASS         Relevant Medications    metFORMIN HCl 1000 MG Oral Tab    Other Rele found.     Fasting Blood Sugar (FSB)Annually Glucose (mg/dL)   Date Value   02/12/2019 171 (H)     GLUCOSE (mg/dL)   Date Value   12/07/2013 146 (H)   11/23/2013 170 (H)          Cardiovascular Disease Screening     LDL Annually LDL Cholesterol (mg/dL)   Da Medium/high risk factors:   End-stage renal disease   Hemophiliacs who received Factor VIII or IX concentrates   Clients of institutions for the mentally retarded   Persons who live in the same house as a HepB virus carrier   Homosexual men   Illicit injec

## 2019-03-14 NOTE — PATIENT INSTRUCTIONS
[facility] SCREENING SCHEDULE   Tests on this list are recommended by your physician but may not be covered, or covered at this frequency, by your insurer. Please check with your insurance carrier before scheduling to verify coverage.    PREV 65-75) IPPE only No results found for this or any previous visit.  Limited to patients who meet one of the following criteria:   • Men who are 73-68 years old and have smoked more than 100 cigarettes in their lifetime   • Anyone with a family history    Col on 02/28/2020 Please get this Mammogram regularly   Immunizations      Influenza  Covered Annually Orders placed or performed in visit on 09/24/18   • FLU VACC HIGH DOSE PRSV FREE   • ADMIN INFLUENZA VIRUS VAC   Orders placed or performed in visit on 12/05 information packet, including necessary form from the [facility]. [email or web address]  A link to the [employer].  This site has a lot of good information including definition

## 2019-03-19 ENCOUNTER — APPOINTMENT (OUTPATIENT)
Dept: WOUND CARE | Facility: HOSPITAL | Age: 69
End: 2019-03-19
Attending: NURSE PRACTITIONER
Payer: MEDICARE

## 2019-03-19 ENCOUNTER — LAB ENCOUNTER (OUTPATIENT)
Dept: LAB | Age: 69
End: 2019-03-19
Attending: FAMILY MEDICINE
Payer: MEDICARE

## 2019-03-19 DIAGNOSIS — E11.40 TYPE 2 DIABETES, CONTROLLED, WITH NEUROPATHY (HCC): Chronic | ICD-10-CM

## 2019-03-19 DIAGNOSIS — E78.5 HYPERLIPIDEMIA LDL GOAL <100: Chronic | ICD-10-CM

## 2019-03-19 DIAGNOSIS — E11.319 DIABETIC RETINOPATHY OF BOTH EYES ASSOCIATED WITH TYPE 2 DIABETES MELLITUS, MACULAR EDEMA PRESENCE UNSPECIFIED, UNSPECIFIED RETINOPATHY SEVERITY (HCC): Chronic | ICD-10-CM

## 2019-03-19 DIAGNOSIS — E55.9 VITAMIN D DEFICIENCY: ICD-10-CM

## 2019-03-19 DIAGNOSIS — I69.351 HEMIPARESIS AFFECTING RIGHT SIDE AS LATE EFFECT OF STROKE (HCC): ICD-10-CM

## 2019-03-19 DIAGNOSIS — M81.6 LOCALIZED OSTEOPOROSIS WITHOUT CURRENT PATHOLOGICAL FRACTURE: ICD-10-CM

## 2019-03-19 DIAGNOSIS — I87.312 CHRONIC VENOUS HYPERTENSION (IDIOPATHIC) WITH ULCER OF LEFT LOWER EXTREMITY (HCC): Primary | ICD-10-CM

## 2019-03-19 DIAGNOSIS — IMO0001 CHRONIC VENOUS HYPERTENSION WITH ULCER INVOLVING LEFT SIDE: ICD-10-CM

## 2019-03-19 DIAGNOSIS — E87.1 HYPONATREMIA: ICD-10-CM

## 2019-03-19 DIAGNOSIS — I10 HYPERTENSION GOAL BP (BLOOD PRESSURE) < 130/80: Chronic | ICD-10-CM

## 2019-03-19 DIAGNOSIS — I67.2 CEREBRAL ATHEROSCLEROSIS: ICD-10-CM

## 2019-03-19 DIAGNOSIS — C50.312 MALIGNANT NEOPLASM OF LOWER-INNER QUADRANT OF LEFT FEMALE BREAST, UNSPECIFIED ESTROGEN RECEPTOR STATUS (HCC): Chronic | ICD-10-CM

## 2019-03-19 DIAGNOSIS — E11.40 TYPE 2 DIABETES MELLITUS WITH DIABETIC NEUROPATHY, WITH LONG-TERM CURRENT USE OF INSULIN (HCC): ICD-10-CM

## 2019-03-19 DIAGNOSIS — Z79.4 TYPE 2 DIABETES MELLITUS WITH DIABETIC NEUROPATHY, WITH LONG-TERM CURRENT USE OF INSULIN (HCC): ICD-10-CM

## 2019-03-19 DIAGNOSIS — L97.929 CHRONIC VENOUS HYPERTENSION (IDIOPATHIC) WITH ULCER OF LEFT LOWER EXTREMITY (HCC): Primary | ICD-10-CM

## 2019-03-19 DIAGNOSIS — I25.10 CAD, MULTIPLE VESSEL: Chronic | ICD-10-CM

## 2019-03-19 DIAGNOSIS — Z23 NEED FOR IMMUNIZATION AGAINST INFLUENZA: ICD-10-CM

## 2019-03-19 DIAGNOSIS — D32.9 BENIGN MENINGIOMA (HCC): ICD-10-CM

## 2019-03-19 DIAGNOSIS — R53.83 FATIGUE, UNSPECIFIED TYPE: ICD-10-CM

## 2019-03-19 LAB
ALBUMIN SERPL-MCNC: 3.4 G/DL (ref 3.4–5)
ALBUMIN/GLOB SERPL: 0.9 {RATIO} (ref 1–2)
ALP LIVER SERPL-CCNC: 77 U/L (ref 55–142)
ALT SERPL-CCNC: 21 U/L (ref 13–56)
ANION GAP SERPL CALC-SCNC: 7 MMOL/L (ref 0–18)
AST SERPL-CCNC: 9 U/L (ref 15–37)
BILIRUB SERPL-MCNC: 0.7 MG/DL (ref 0.1–2)
BUN BLD-MCNC: 16 MG/DL (ref 7–18)
BUN/CREAT SERPL: 24.2 (ref 10–20)
CALCIUM BLD-MCNC: 8.5 MG/DL (ref 8.5–10.1)
CHLORIDE SERPL-SCNC: 106 MMOL/L (ref 98–107)
CHOLEST SMN-MCNC: 158 MG/DL (ref ?–200)
CO2 SERPL-SCNC: 29 MMOL/L (ref 21–32)
CREAT BLD-MCNC: 0.66 MG/DL (ref 0.55–1.02)
EST. AVERAGE GLUCOSE BLD GHB EST-MCNC: 197 MG/DL (ref 68–126)
GLOBULIN PLAS-MCNC: 3.8 G/DL (ref 2.8–4.4)
GLUCOSE BLD-MCNC: 172 MG/DL (ref 70–99)
HBA1C MFR BLD HPLC: 8.5 % (ref ?–5.7)
HDLC SERPL-MCNC: 57 MG/DL (ref 40–59)
LDLC SERPL CALC-MCNC: 75 MG/DL (ref ?–100)
M PROTEIN MFR SERPL ELPH: 7.2 G/DL (ref 6.4–8.2)
NONHDLC SERPL-MCNC: 101 MG/DL (ref ?–130)
OSMOLALITY SERPL CALC.SUM OF ELEC: 299 MOSM/KG (ref 275–295)
POTASSIUM SERPL-SCNC: 3.7 MMOL/L (ref 3.5–5.1)
SED RATE-ML: 17 MM/HR (ref 0–25)
SODIUM SERPL-SCNC: 142 MMOL/L (ref 136–145)
T4 FREE SERPL-MCNC: 1.1 NG/DL (ref 0.8–1.7)
TRIGL SERPL-MCNC: 131 MG/DL (ref 30–149)
TSI SER-ACNC: 0.62 MIU/ML (ref 0.36–3.74)
VLDLC SERPL CALC-MCNC: 26 MG/DL (ref 0–30)

## 2019-03-19 PROCEDURE — 84443 ASSAY THYROID STIM HORMONE: CPT

## 2019-03-19 PROCEDURE — 36415 COLL VENOUS BLD VENIPUNCTURE: CPT

## 2019-03-19 PROCEDURE — 80053 COMPREHEN METABOLIC PANEL: CPT

## 2019-03-19 PROCEDURE — 84439 ASSAY OF FREE THYROXINE: CPT

## 2019-03-19 PROCEDURE — 99212 OFFICE O/P EST SF 10 MIN: CPT

## 2019-03-19 PROCEDURE — 80061 LIPID PANEL: CPT

## 2019-03-19 PROCEDURE — 85652 RBC SED RATE AUTOMATED: CPT

## 2019-03-19 PROCEDURE — 83036 HEMOGLOBIN GLYCOSYLATED A1C: CPT

## 2019-03-19 NOTE — PROGRESS NOTES
Progress Note Details  Patient Name: Marques Velázquez Date: 3/19/2019   Patient Number: [id number] Physician / Reginaldo Conquest: Army Rush   Patient YOB: 1950 Facility: [facility]    Chief Complaint  This information was ob Discussed about venous reflux study as she stated that she has not had any done in the past and does not want any done at present. Only arterial US was done and seen by [facility] in 4/2018 and CTA was done-PAD and small vessel disease.  Pt never followed up 1/15/18: Pt is here for follow up of leg wound. States dressings are sticking too much with dressing change and is very painful. Patient wants the silver contact layer instead. 1-23-18 I am covering provider for usual provider.  patient presents today w 3/12/18: Pt states she has been doing well with dressing changes and wound. She is having less pain. In addition her endocrinologist decreased her dose of Actos to try and reduce her leg swelling. 3/19/18: Pt is here for follow up.  States she walked a 5/14/18: Pt had Jobst 30-40 mm Hg stockings on right leg. Once stocking was removed patient noted bruising and blister to front of leg. Has not worn stocking since.  With her insurance was only able to go to one [facility] and they did not have [facility] 2 layer stoc Cardiovascular (Central/Peripheral): Chest Pain, Dyspnea on Exertion, Intermittent Claudication, Lower extremity (leg) resting pain  Gastrointestinal (GI): Bowel Incontinence, Change in Bowel Habits, Nausea / Vomiting / Diarrhea (N/V/D), Loss of Appetite, Right Extremity: Edema is present   Compression Device In Use: Yes   Device Used Correctly: Yes   Compression Device Used: Multi-Layer Wrap   Calf Measurement 31 cm from heel with right measurement of 40.5 cm   Ankle Measurement 10 cm from heel with right Discussed about routine diabetic foot exam and care including: daily inspection, any signs of breakdown, moisturization with amlactin but not between the toes. Emphasized appropriate shoe wear and minimizing barefoot.    CHECK YOUR FEET DAILY FOR ANY OPEN A

## 2019-03-21 ENCOUNTER — LAB ENCOUNTER (OUTPATIENT)
Dept: LAB | Facility: HOSPITAL | Age: 69
End: 2019-03-21
Attending: FAMILY MEDICINE
Payer: MEDICARE

## 2019-03-21 ENCOUNTER — TELEPHONE (OUTPATIENT)
Dept: FAMILY MEDICINE CLINIC | Facility: CLINIC | Age: 69
End: 2019-03-21

## 2019-03-21 DIAGNOSIS — K52.9 CHRONIC DIARRHEA: ICD-10-CM

## 2019-03-21 LAB — C DIFF TOX B STL QL: POSITIVE

## 2019-03-21 PROCEDURE — 87046 STOOL CULTR AEROBIC BACT EA: CPT

## 2019-03-21 PROCEDURE — 87493 C DIFF AMPLIFIED PROBE: CPT

## 2019-03-21 PROCEDURE — 87427 SHIGA-LIKE TOXIN AG IA: CPT

## 2019-03-21 PROCEDURE — 87272 CRYPTOSPORIDIUM AG IF: CPT

## 2019-03-21 PROCEDURE — 87177 OVA AND PARASITES SMEARS: CPT

## 2019-03-21 PROCEDURE — 87045 FECES CULTURE AEROBIC BACT: CPT

## 2019-03-21 PROCEDURE — 87209 SMEAR COMPLEX STAIN: CPT

## 2019-03-21 PROCEDURE — 87329 GIARDIA AG IA: CPT

## 2019-03-21 PROCEDURE — 89055 LEUKOCYTE ASSESSMENT FECAL: CPT

## 2019-03-21 NOTE — TELEPHONE ENCOUNTER
Diane Maria called wondering if this office had called her regarding test results. Read her  comments on recent labs regarding Toujeo dosing.   Janeth Perez commented on the same results as follows: Please inform patient to send us glucoses.  Need to know w

## 2019-03-22 LAB
CRYPTOSP AG STL QL IA: NEGATIVE
G LAMBLIA AG STL QL IA: NEGATIVE

## 2019-03-22 NOTE — PROGRESS NOTES
Patient informed of Dr. Charmayne Fuss result note. Patient verbalized understanding and agrees with plan.

## 2019-03-25 LAB
OVA AND PARASITE, TRICHROME STAIN: NEGATIVE
OVA AND PARASITE, WET MOUNT: NEGATIVE

## 2019-03-27 ENCOUNTER — OFFICE VISIT (OUTPATIENT)
Dept: WOUND CARE | Facility: HOSPITAL | Age: 69
End: 2019-03-27
Attending: NURSE PRACTITIONER
Payer: MEDICARE

## 2019-03-27 ENCOUNTER — PATIENT OUTREACH (OUTPATIENT)
Dept: CASE MANAGEMENT | Age: 69
End: 2019-03-27

## 2019-03-27 DIAGNOSIS — I87.312 CHRONIC VENOUS HYPERTENSION (IDIOPATHIC) WITH ULCER OF LEFT LOWER EXTREMITY (HCC): Primary | ICD-10-CM

## 2019-03-27 DIAGNOSIS — E11.40 TYPE 2 DIABETES MELLITUS WITH DIABETIC NEUROPATHY, WITH LONG-TERM CURRENT USE OF INSULIN (HCC): ICD-10-CM

## 2019-03-27 DIAGNOSIS — I73.9 PERIPHERAL VASCULAR DISEASE, UNSPECIFIED (HCC): ICD-10-CM

## 2019-03-27 DIAGNOSIS — M81.6 LOCALIZED OSTEOPOROSIS WITHOUT CURRENT PATHOLOGICAL FRACTURE: ICD-10-CM

## 2019-03-27 DIAGNOSIS — Z79.4 TYPE 2 DIABETES MELLITUS WITH DIABETIC NEUROPATHY, WITH LONG-TERM CURRENT USE OF INSULIN (HCC): ICD-10-CM

## 2019-03-27 DIAGNOSIS — E78.5 HYPERLIPIDEMIA LDL GOAL <100: Chronic | ICD-10-CM

## 2019-03-27 DIAGNOSIS — F32.1 MODERATE SINGLE CURRENT EPISODE OF MAJOR DEPRESSIVE DISORDER (HCC): ICD-10-CM

## 2019-03-27 DIAGNOSIS — I10 ESSENTIAL HYPERTENSION: ICD-10-CM

## 2019-03-27 DIAGNOSIS — C50.312 MALIGNANT NEOPLASM OF LOWER-INNER QUADRANT OF LEFT FEMALE BREAST, UNSPECIFIED ESTROGEN RECEPTOR STATUS (HCC): Chronic | ICD-10-CM

## 2019-03-27 DIAGNOSIS — L97.929 CHRONIC VENOUS HYPERTENSION (IDIOPATHIC) WITH ULCER OF LEFT LOWER EXTREMITY (HCC): Primary | ICD-10-CM

## 2019-03-27 DIAGNOSIS — M47.816 ARTHRITIS OF FACET JOINT OF LUMBAR SPINE: ICD-10-CM

## 2019-03-27 DIAGNOSIS — G47.33 OBSTRUCTIVE SLEEP APNEA: Chronic | ICD-10-CM

## 2019-03-27 DIAGNOSIS — I77.9 BILATERAL CAROTID ARTERY DISEASE, UNSPECIFIED TYPE (HCC): ICD-10-CM

## 2019-03-27 PROCEDURE — 29581 APPL MULTLAYER CMPRN SYS LEG: CPT

## 2019-03-27 PROCEDURE — 99214 OFFICE O/P EST MOD 30 MIN: CPT

## 2019-03-27 PROCEDURE — 97597 DBRDMT OPN WND 1ST 20 CM/<: CPT

## 2019-03-27 NOTE — PROGRESS NOTES
Progress Note Details  Patient Name: Marci Powers Date: 3/27/2019   Patient Number: [id number] Physician / Marixa Peoples Hospital: [facility]   Patient YOB: 1950 Facility: [facility]    Chief Complaint  This information was ob Discussed about venous reflux study as she stated that she has not had any done in the past and does not want any done at present. Only arterial US was done and seen by [facility] in 4/2018 and CTA was done-PAD and small vessel disease.  Pt never followed up --------------------------------------------------------------------------------------------------------------------------------------------  Previous visits  1/8/18: Pt is here for initial evaluation of right lower leg ulcer.  Patient had a similar wound t 2/12/18: Pt is here for follow up. She has received her santyl. She was started on gabapentin by neurology but states she had a bad reaction and felt very dizzy and was slurring her speech so she discontinued.  She states overall she is fatigued and thinks 4/30/18: Pt here for follow up. She was unable to  her compression stockings because the company was closed on Friday but she will be picking them up today. 5/3/18: Pt was healed and discharged on Monday.  She states she picked up her compression Genitourinary (): Frequency (Due to diuretic use), Urinary Incontinence  Musculoskeletal: Assistive Devices (cane)  Integumentary (Hair/Skin/Nails): Prone to Skin Tears (Plavix ), Dryness (generalized), Hemosiderin Staining (BLE)  Neurological: Loss of P Intact judgement and insight. Approptiate affect. Integumentary (Hair, Skin)  Wound #11 Left, Anterior Lower Leg is an acute Full Thickness blister and has received a status of Not Healed.  Initial wound encounter measurements are 3.8cm length x 2.8cm w Wound #13 Right, Lateral Lower Leg is an acute Full Thickness blister and has received a status of Not Healed. Initial wound encounter measurements are 2.5cm length x 3.8cm width with no measurable depth, with an area of 9.5 sq cm .  No tunneling has been n Extremity Color: Pigmented   Hair Growth on Extremity: No   Temperature of Extremity: Warm   Capillary Refill: < 3 seconds   Erythema: No   Dependent Rubor: No   Hyperpigmentation: Yes   Lipodermatosclerosis: No          Assessment    Active Problems    IC Initial Anesthetic Order: Apply lidocaine to wound bed on all future wound center visits during preparation for physician exam if wound bed contains fibrin or eschar.   4% Topical Lidocaine  Dermaplast Spray    Wound Cleansing & Dressings  May shower with p Increase dietary protein - Focus on increasing protein in your diet as albumin levels are low  Decrease salt intake  Moisturizer - AMLACTIN can be purchased over the counter  S/S of Infection  Non-adherence - To diuretics -does not take when she has to go

## 2019-03-27 NOTE — PROGRESS NOTES
Attempted to contact pt no answer left detailed message for pt to call back. Care plan: Reviewed. AWV up to date.     Time spent: 2 min collecting, reviewing pt data, communication and documentation  CLINICAL STAFF TIME: 3/21/18 ENCOUNTER 20 MIN    Total

## 2019-03-29 ENCOUNTER — APPOINTMENT (OUTPATIENT)
Dept: WOUND CARE | Age: 69
End: 2019-03-29
Attending: NURSE PRACTITIONER
Payer: MEDICARE

## 2019-03-31 PROCEDURE — 99490 CHRNC CARE MGMT STAFF 1ST 20: CPT

## 2019-04-02 ENCOUNTER — MA CHART PREP (OUTPATIENT)
Dept: FAMILY MEDICINE CLINIC | Facility: CLINIC | Age: 69
End: 2019-04-02

## 2019-04-02 ENCOUNTER — OFFICE VISIT (OUTPATIENT)
Dept: WOUND CARE | Facility: HOSPITAL | Age: 69
End: 2019-04-02
Attending: NURSE PRACTITIONER
Payer: MEDICARE

## 2019-04-02 DIAGNOSIS — L97.929 CHRONIC VENOUS HYPERTENSION (IDIOPATHIC) WITH ULCER OF LEFT LOWER EXTREMITY (HCC): Primary | ICD-10-CM

## 2019-04-02 DIAGNOSIS — I73.9 PERIPHERAL VASCULAR DISEASE, UNSPECIFIED (HCC): ICD-10-CM

## 2019-04-02 DIAGNOSIS — I87.312 CHRONIC VENOUS HYPERTENSION (IDIOPATHIC) WITH ULCER OF LEFT LOWER EXTREMITY (HCC): Primary | ICD-10-CM

## 2019-04-02 PROCEDURE — 29581 APPL MULTLAYER CMPRN SYS LEG: CPT

## 2019-04-02 NOTE — PROGRESS NOTES
Progress Note Details  Patient Name: Marisa Bosch Date: 4/2/2019   Patient Number: [id number] Physician / Arnaldo Bank: Yulisa Roblero   Patient YOB: 1950 Facility: [address] [place] [address]    Chief Complaint  This information was obt Discussed about venous reflux study as she stated that she has not had any done in the past and does not want any done at present. Only arterial US was done and seen by [facility] in 4/2018 and CTA was done-PAD and small vessel disease.  Pt never followed up 4/2/19-Cancelled appointment 3/29/19 for compression change. Afebrile, denies any pain and no s/s of infection in wound. More wounds noted in BLE. Blister have opened into wounds. Tolerated compression well and decrease in edema noted. Denies n/v/d.  Still 1/29/18: Pt returns for follow up, has been taking Cipro for wound infection and tolerating well. She was in the ER on Saturday because wound was bleeding heavily and home health RN was unable to get it to stop.  Since then states she has had some bleeding 4/16/18: Pt here for follow up, wound continues to heal well. Pt has not ordered her compression stockings yet as the company asked her if she would like to wait until wounds are healed to order. Pt was instructed to order stockings now.  She also states sh 6/25/18: Pt has compression stocking on right leg today, has been more adherent with use.     Review of Systems (ROS)  This information was obtained from the patient    Complaints and Symptoms  Patient complains of:   Respiratory: Other (has sleep apnea ) BP WNL. Pulse RRR. RR within normal limits. Afebrile. Elevated BMI. Alert, calm, well developed, in no apparent distress.  Height/Length: 64 in (162.56 cm), Weight: 180 lbs (81.82 kgs), BMI: 30.9, Temperature: 98.0 °F (36.67 °C), Pulse: 89 bpm, Respiratory Wound #12 Left, Medial Lower Leg is an acute Full Thickness Venous Ulcer and has received a status of Bridged. Subsequent wound encounter measurements are 8cm length x 7.5cm width with no measurable depth, with an area of 60 sq cm .  No tunneling has been n Wound #14 Left, Lateral Lower Leg is an acute Full Thickness Venous Ulcer and has received a status of Not Healed. Initial wound encounter measurements are 5cm length x 4.5cm width with no measurable depth, with an area of 22.5 sq cm .  No tunneling has bee Ankle Measurement 10 cm from heel with left measurement of 31.5 cm  Right Extremity: Edema is present   Compression Device In Use: Yes   Device Used Correctly: Yes   Compression Device Used: Multi-Layer Wrap   Calf Measurement 31 cm from heel with right me Wound #11 (Other) is located on the left, anterior lower leg. A Multi-Layer Compression Wrap procedure was performed. A 2 Layers Unna Boot was applied with moderate 15-25 mmhg. The procedure was tolerated well.    General Notes:  calamine unna boot    Wound Initial Anesthetic Order: Apply lidocaine to wound bed on all future wound center visits during preparation for physician exam if wound bed contains fibrin or eschar. 4% Topical Lidocaine    Wound Cleansing & Dressings  May shower with protection.   Cleans Discussed the Plan of Care at bedside with patient. The patient verbally acknowledges understanding of all instructions and all questions were answered.    Workflow: Venous  Perfusion assessed by ANNEMARIE/TBI - TBI Right 0.42, Left 0.46 11/16  CT angio with runo

## 2019-04-05 ENCOUNTER — OFFICE VISIT (OUTPATIENT)
Dept: WOUND CARE | Age: 69
End: 2019-04-05
Attending: NURSE PRACTITIONER
Payer: MEDICARE

## 2019-04-05 DIAGNOSIS — L97.929 CHRONIC VENOUS HYPERTENSION (IDIOPATHIC) WITH ULCER OF LEFT LOWER EXTREMITY (HCC): Primary | ICD-10-CM

## 2019-04-05 DIAGNOSIS — I73.9 PERIPHERAL VASCULAR DISEASE, UNSPECIFIED (HCC): ICD-10-CM

## 2019-04-05 DIAGNOSIS — I87.312 CHRONIC VENOUS HYPERTENSION (IDIOPATHIC) WITH ULCER OF LEFT LOWER EXTREMITY (HCC): Primary | ICD-10-CM

## 2019-04-05 PROCEDURE — 29581 APPL MULTLAYER CMPRN SYS LEG: CPT

## 2019-04-09 ENCOUNTER — OFFICE VISIT (OUTPATIENT)
Dept: WOUND CARE | Facility: HOSPITAL | Age: 69
End: 2019-04-09
Attending: NURSE PRACTITIONER
Payer: MEDICARE

## 2019-04-09 DIAGNOSIS — I87.312 CHRONIC VENOUS HYPERTENSION (IDIOPATHIC) WITH ULCER OF LEFT LOWER EXTREMITY (HCC): Primary | ICD-10-CM

## 2019-04-09 DIAGNOSIS — I73.9 PERIPHERAL VASCULAR DISEASE, UNSPECIFIED (HCC): ICD-10-CM

## 2019-04-09 DIAGNOSIS — L97.929 CHRONIC VENOUS HYPERTENSION (IDIOPATHIC) WITH ULCER OF LEFT LOWER EXTREMITY (HCC): Primary | ICD-10-CM

## 2019-04-09 PROCEDURE — 29581 APPL MULTLAYER CMPRN SYS LEG: CPT

## 2019-04-09 PROCEDURE — 99214 OFFICE O/P EST MOD 30 MIN: CPT

## 2019-04-09 NOTE — PROGRESS NOTES
Progress Note Details  Patient Name: Cal Martinez Date: 4/9/2019   Patient Number: [id number] Physician / Shaun Jaramillo: Suki Brush   Patient YOB: 1950 Facility: [facility]    Chief Complaint  This information was obt Discussed about venous reflux study as she stated that she has not had any done in the past and does not want any done at present. Only arterial US was done and seen by [facility] in 4/2018 and CTA was done-PAD and small vessel disease.  Pt never followed up 4/2/19-Cancelled appointment 3/29/19 for compression change. Afebrile, denies any pain and no s/s of infection in wound. More wounds noted in BLE. Blister have opened into wounds. Tolerated compression well and decrease in edema noted. Denies n/v/d.  Still 5/14/18: Pt had Jobst 30-40 mm Hg stockings on right leg. Once stocking was removed patient noted bruising and blister to front of leg. Has not worn stocking since.  With her insurance was only able to go to one [facility] and they did not have [facility] 2 layer stoc Medication reconciliation completed at today's visit. : Yes        Objective    Constitutional  BP Elevated, on antihypertensive meds. . Pulse RRR. RR within normal limits. Afebrile. Elevated BMI. Alert, calm, well developed, in no apparent distress.  Height Wound #12 Left, Medial Lower Leg is an acute Full Thickness Venous Ulcer and has received a status of Bridged. Subsequent wound encounter measurements are 7cm length x 3.4cm width with no measurable depth, with an area of 23.8 sq cm .  No tunneling has been Wound #14 Left, Lateral Lower Leg is an acute Full Thickness Venous Ulcer and has received a status of Not Healed. Subsequent wound encounter measurements are 4.1cm length x 1.4cm width with no measurable depth, with an area of 5.74 sq cm .  No tunneling ha Wound #16 Left, Distal, Anterior Lower Leg is an acute Full Thickness blister and has received a status of Not Healed. Initial wound encounter measurements are 2.1cm length x 2.4cm width with no measurable depth, with an area of 5.04 sq cm .  No tunneling h Lipodermatosclerosis: No          Assessment    Active Problems    ICD-10  (Encounter Diagnosis) K30.039V - Unspecified open wound, left lower leg, initial encounter  (Encounter Diagnosis) S81.801D - Unspecified open wound, right lower leg, subsequent enco Initial Anesthetic Order: Apply lidocaine to wound bed on all future wound center visits during preparation for physician exam if wound bed contains fibrin or eschar. 4% Topical Lidocaine    Wound Cleansing & Dressings  May shower with protection.   Cleans 4% Topical Lidocaine    Wound Cleansing & Dressings  May shower with protection.   Cleanse with saline or wound cleanser  Collagen - Fibracol  Silver Foam  Kerlix  Paper tape  Change dressing every: - Friday and Tuesday    Additional Orders:    Compression Here for BLE wound management. Wound progressing well. New wound noted from open blister. No s/s of infection. Will reassess wound in 1 week. Goal is to heal wounds.    Counseled on leg elevation, diuretic adherence and compression to decrease edema and pro

## 2019-04-11 ENCOUNTER — TELEPHONE (OUTPATIENT)
Dept: FAMILY MEDICINE CLINIC | Facility: CLINIC | Age: 69
End: 2019-04-11

## 2019-04-11 DIAGNOSIS — A09 DIARRHEA OF INFECTIOUS ORIGIN: Primary | ICD-10-CM

## 2019-04-11 NOTE — TELEPHONE ENCOUNTER
If it is watery, we can repeat the c dif test. Typically with correct abx, stool witll be formed until days to weeks later with recurrence.

## 2019-04-11 NOTE — TELEPHONE ENCOUNTER
Patient reports her stools had improved with abx, but now diarrhea has returned x 2 days. Patient will be done with Flagyl after today. She is wondering what to do?     Routed to Dr. Farzana Todd

## 2019-04-12 ENCOUNTER — OFFICE VISIT (OUTPATIENT)
Dept: WOUND CARE | Age: 69
End: 2019-04-12
Attending: NURSE PRACTITIONER
Payer: MEDICARE

## 2019-04-12 DIAGNOSIS — I73.9 PERIPHERAL VASCULAR DISEASE, UNSPECIFIED (HCC): ICD-10-CM

## 2019-04-12 DIAGNOSIS — I87.312 CHRONIC VENOUS HYPERTENSION (IDIOPATHIC) WITH ULCER OF LEFT LOWER EXTREMITY (HCC): Primary | ICD-10-CM

## 2019-04-12 DIAGNOSIS — L97.929 CHRONIC VENOUS HYPERTENSION (IDIOPATHIC) WITH ULCER OF LEFT LOWER EXTREMITY (HCC): Primary | ICD-10-CM

## 2019-04-12 PROCEDURE — 29581 APPL MULTLAYER CMPRN SYS LEG: CPT

## 2019-04-15 ENCOUNTER — APPOINTMENT (OUTPATIENT)
Dept: LAB | Age: 69
End: 2019-04-15
Attending: FAMILY MEDICINE
Payer: MEDICARE

## 2019-04-15 ENCOUNTER — TELEPHONE (OUTPATIENT)
Dept: FAMILY MEDICINE CLINIC | Facility: CLINIC | Age: 69
End: 2019-04-15

## 2019-04-15 DIAGNOSIS — A09 DIARRHEA OF INFECTIOUS ORIGIN: ICD-10-CM

## 2019-04-15 PROCEDURE — 87493 C DIFF AMPLIFIED PROBE: CPT

## 2019-04-15 NOTE — TELEPHONE ENCOUNTER
Sher Vanessa from [facility] is calling requesting to speak to nurse regarding test cancellation.

## 2019-04-16 ENCOUNTER — OFFICE VISIT (OUTPATIENT)
Dept: WOUND CARE | Facility: HOSPITAL | Age: 69
End: 2019-04-16
Attending: NURSE PRACTITIONER
Payer: MEDICARE

## 2019-04-16 DIAGNOSIS — L97.929 CHRONIC VENOUS HYPERTENSION (IDIOPATHIC) WITH ULCER OF LEFT LOWER EXTREMITY (HCC): Primary | ICD-10-CM

## 2019-04-16 DIAGNOSIS — I87.312 CHRONIC VENOUS HYPERTENSION (IDIOPATHIC) WITH ULCER OF LEFT LOWER EXTREMITY (HCC): Primary | ICD-10-CM

## 2019-04-16 DIAGNOSIS — S81.802A OPEN WOUND OF BOTH LOWER EXTREMITIES WITH COMPLICATION, INITIAL ENCOUNTER: ICD-10-CM

## 2019-04-16 DIAGNOSIS — I73.9 PERIPHERAL VASCULAR DISEASE, UNSPECIFIED (HCC): ICD-10-CM

## 2019-04-16 DIAGNOSIS — S81.801A OPEN WOUND OF BOTH LOWER EXTREMITIES WITH COMPLICATION, INITIAL ENCOUNTER: ICD-10-CM

## 2019-04-16 PROCEDURE — 29581 APPL MULTLAYER CMPRN SYS LEG: CPT

## 2019-04-16 NOTE — PROGRESS NOTES
Progress Note Details  Patient Name: Yaya Cunha Date: 4/16/2019   Patient Number: [id number] Physician / Sabiha Livingston: Patricia Damon   Patient YOB: 1950 Facility: [facility]    Chief Complaint  This information was ob vessel disease. Pt never followed up with . QDI5w-0.0 in 9/2018 and managed by Ana Cristina Fuentes stated that there is f/u scheduled. 2/19/19-Afebrile, denies any pain and no s/s of infection. Tolerated compression well. [facility] not eligible at this time. cough and sneezing due to allergies. She will schedule appointment with PCP today. 4/9/19-Afebrile, denies pain and no s/s of infection. Tolerated compression. Wounds have decreased in size and new open blister.  Denies any diarrhea-still taking antibiotic to go to one DME and they did not have Simon 2 layer stockings that were recommended. Pt also saw Dr. Rj Sweet who states no intervention needed, pt to keep wearing stockings.     Review of Systems (ROS)  This information was obtained from the patient    Com distress. Height/Length: 64 in (162.56 cm), Weight: 180 lbs (81.82 kgs), BMI: 30.9, Temperature: 98.2 °F (36.78 °C), Pulse: 81 bpm, Respiratory Rate: 16 breaths/min, Blood Pressure: 116/73 mmHg, Capillary Blood Glucose: 202 mg/dl.      Respiratory:  Normal reports a wound pain of level 0/10. Wound bed has % epithelialization. Local Pulse is Weak. Wound #14 Left, Lateral Lower Leg is an acute Full Thickness Venous Ulcer and has received a status of Not Healed.  Subsequent wound encounter measurement heel with right measurement of 28 cm  Vascular Assessment:  Left Extremity colors, hair growth, and conditions:   Extremity Color: Pigmented   Hair Growth on Extremity: No   Temperature of Extremity: Warm   Capillary Refill: < 3 seconds   Erythema: No   Salvatore Blamer 15-25 mmhg. The procedure was tolerated well.         Plan    Wound Orders:  Wound #12 Left, Medial Lower Leg     Topicals:  Initial Anesthetic Order: Apply lidocaine to wound bed on all future wound center visits during preparation for physician exam if wo patient verbally acknowledges understanding of all instructions and all questions were answered. Wound improving. No s/s of infection.    Workflow: Venous  Perfusion assessed by ANNEMARIE/TBI - TBI Right 0.42, Left 0.46 11/16  CT angio with runoff 4/2/18 + PAD

## 2019-04-17 ENCOUNTER — TELEPHONE (OUTPATIENT)
Dept: FAMILY MEDICINE CLINIC | Facility: CLINIC | Age: 69
End: 2019-04-17

## 2019-04-17 ENCOUNTER — PATIENT OUTREACH (OUTPATIENT)
Dept: CASE MANAGEMENT | Age: 69
End: 2019-04-17

## 2019-04-17 DIAGNOSIS — I73.9 PERIPHERAL VASCULAR DISEASE, UNSPECIFIED (HCC): ICD-10-CM

## 2019-04-17 DIAGNOSIS — L97.929 CHRONIC VENOUS HYPERTENSION W ULCER OF L LOW EXTREM (HCC): Primary | ICD-10-CM

## 2019-04-17 DIAGNOSIS — I87.312 CHRONIC VENOUS HYPERTENSION W ULCER OF L LOW EXTREM (HCC): Primary | ICD-10-CM

## 2019-04-17 NOTE — PROGRESS NOTES
Attempted to contact pt no answer left detailed message for pt to call back.    Total time spent with patient including chart review: 2  Time spent with patient this month: 8    Total time spent with communication and chart review this month to date: 8

## 2019-04-18 NOTE — TELEPHONE ENCOUNTER
Referral request for the Wound Clinic.     Patient seen by Dr. Asia Guillen M.D. 3/14/19  Office notes from Dr. Asia Guillen M.D. 3/14/19  Chronic venous hypertension with ulcer involving left side [facility])      Overview       11/02/16  Jonathon LE Arterial Duplex

## 2019-04-19 ENCOUNTER — PRIOR ORIGINAL RECORDS (OUTPATIENT)
Dept: OTHER | Age: 69
End: 2019-04-19

## 2019-04-22 ENCOUNTER — TELEPHONE (OUTPATIENT)
Dept: FAMILY MEDICINE CLINIC | Facility: CLINIC | Age: 69
End: 2019-04-22

## 2019-04-22 DIAGNOSIS — R19.7 DIARRHEA, UNSPECIFIED TYPE: Primary | ICD-10-CM

## 2019-04-22 NOTE — TELEPHONE ENCOUNTER
Reviewed directive with pt and she verbalized understanding. Pt states that stool is liquid at this time. Reentered order for C-diff.

## 2019-04-22 NOTE — TELEPHONE ENCOUNTER
Pt states that she has been having diarrhea 5-6 times daily for at least 1.5 weeks. She is asking for test results of C- diff. Pt submitted a sample to [facility] last week.      On 4/15/19, the lab stated that C-diff was cancelled because pt turned in [address] [employer]

## 2019-04-22 NOTE — TELEPHONE ENCOUNTER
Is it back to liquid. They will not run it unless it is liquid. If it was solid and now liquid, we can reorder c dif.  Otherwise, ok for imodium and follow up if no change in 1-2 days

## 2019-04-23 ENCOUNTER — OFFICE VISIT (OUTPATIENT)
Dept: WOUND CARE | Facility: HOSPITAL | Age: 69
End: 2019-04-23
Attending: NURSE PRACTITIONER
Payer: MEDICARE

## 2019-04-23 ENCOUNTER — APPOINTMENT (OUTPATIENT)
Dept: LAB | Facility: HOSPITAL | Age: 69
End: 2019-04-23
Attending: FAMILY MEDICINE
Payer: MEDICARE

## 2019-04-23 DIAGNOSIS — I87.312 CHRONIC VENOUS HYPERTENSION (IDIOPATHIC) WITH ULCER OF LEFT LOWER EXTREMITY (HCC): Primary | ICD-10-CM

## 2019-04-23 DIAGNOSIS — L97.929 CHRONIC VENOUS HYPERTENSION (IDIOPATHIC) WITH ULCER OF LEFT LOWER EXTREMITY (HCC): Primary | ICD-10-CM

## 2019-04-23 DIAGNOSIS — R19.7 DIARRHEA, UNSPECIFIED TYPE: ICD-10-CM

## 2019-04-23 DIAGNOSIS — I73.9 PERIPHERAL VASCULAR DISEASE, UNSPECIFIED (HCC): ICD-10-CM

## 2019-04-23 PROCEDURE — 87493 C DIFF AMPLIFIED PROBE: CPT

## 2019-04-23 PROCEDURE — 82962 GLUCOSE BLOOD TEST: CPT

## 2019-04-23 PROCEDURE — 29581 APPL MULTLAYER CMPRN SYS LEG: CPT

## 2019-04-23 NOTE — PROGRESS NOTES
Progress Note Details  Patient Name: Anjana Wolfe Date: 4/23/2019   Patient Number: [id number] Physician / Cheryl Zuniga: Stephanie Hernández   Patient YOB: 1950 [facility]    Chief Complaint  This information was ob vessel disease. Pt never followed up with . EBU0n-5.0 in 9/2018 and managed by Arielle Gates stated that there is f/u scheduled. 2/19/19-Afebrile, denies any pain and no s/s of infection. Tolerated compression well. [facility] not eligible at this time. cough and sneezing due to allergies. She will schedule appointment with PCP today. 4/9/19-Afebrile, denies pain and no s/s of infection. Tolerated compression. Wounds have decreased in size and new open blister.  Denies any diarrhea-still taking antibiotic in May.     5/14/18: Pt had Jobst 30-40 mm Hg stockings on right leg. Once stocking was removed patient noted bruising and blister to front of leg. Has not worn stocking since.  With her insurance was only able to go to one [facility] and they did not have Simon Information  Medication reconciliation completed at today's visit. : Yes        Objective    Constitutional  BP Elevated, on antihypertensive meds. . Pulse RRR. RR within normal limits. Afebrile. Elevated BMI.  Alert, calm, well developed, in no apparent dis cm from heel with left measurement of 35 cm   Ankle Measurement 10 cm from heel with left measurement of 25.6 cm  Right Extremity: Edema is present   Compression Device In Use: Yes   Device Used Correctly: Yes   Compression Device Used: Multi-Layer Wrap pectoris        Procedures    Wound #12  Wound #12 (Venous Ulcer) is located on the left, medial lower leg. A Multi-Layer Compression Wrap procedure was performed by Anshul Nassar RN. A 2 Layers Unna Boot was applied with moderate 15-25 mmhg.  The proc by Dr. Fabricio Strickland assessed by doppler. - Biphasic signals at the dp/pt bilateral  Perfusion assessed by palpation of pulses.  - 4/23/19 +1 bilateral DP and PT  Last sharp debridement date: - 3/27/19  Last A1C date and value: - 2/12/19 8.7-managed by

## 2019-04-29 NOTE — TELEPHONE ENCOUNTER
After taking tablet she became unsteady and then had slurred speech, she is better now so I told her to hold the medication as she is coming in tomorrow to see Dr Maryan Haywood SEJAL ambulatory encounter  FAMILY PRACTICE OFFICE VISIT    CHIEF COMPLAINT:    Chief Complaint   Patient presents with   • Follow-up       SUBJECTIVE:  Jean Claude Robledo is a 86 year old male presents today for follow-up to multiple issues.     Depression, chest pain, and insomnia- Has stopped symbicort and patient and son believe things are getting better.  They plan on continuing to hold his medication.  Denies current dyspnea or chest pain.  Will be following up with cardiology.         Nursing notes reviewed.      PROBLEM LIST:    Patient Active Problem List   Diagnosis   • Atrial fibrillation, unspecified type (CMS/HCC)   • Hypertension, well controlled   • Arthritis   • Diverticulitis   • Hyperlipemia   • Scrotal abscess   • Coronary artery disease   • Cataract, bilateral   • Primary open angle glaucoma (POAG) of both eyes   • Heat stroke   • H/O: depression   • Amblyopia   • Trigeminal neuralgia   • TIA (transient ischemic attack)   • Stroke (CMS/HCC)   • Sleep apnea   • Pacemaker   • Ischemic vascular disease   • Hypertension   • H/O unilateral orchiectomy   • Exotropia of right eye   • Dry eye syndrome   • Dermatitis   • Depression   • Cutaneous candidiasis   • Choroidal neovascularization of left eye   • Cataract and glaucoma syndrome   • A-fib (CMS/HCC)       PAST HISTORIES:  Past Medical History:   Diagnosis Date   • A-fib (CMS/HCC)    • Amblyopia    • Arthritis    • Cataract and glaucoma syndrome    • Choroidal neovascularization of left eye    • Coronary artery disease    • Cutaneous candidiasis    • Depression    • Dermatitis    • Diverticulitis    • Dry eye syndrome    • Exotropia of right eye    • H/O unilateral orchiectomy    • Hyperlipemia    • Hypertension    • Ischemic vascular disease    • Pacemaker    • Scrotal abscess    • Sleep apnea    • Stroke (CMS/HCC)    • TIA (transient ischemic attack)    • Trigeminal neuralgia      Past Surgical History:   Procedure Laterality Date   • Cath placed  in coronary angiography w md sup & interp lt heart w graft Left 2011   • Cholecystectomy     • Hernia repair     • Orchiectomy Right 10/30/2015   • Pacemaker implant  2011     Family History   Problem Relation Age of Onset   • Coronary Artery Disease Mother    • Hypertension Mother    • Cataracts Brother    • Asthma Paternal Grandfather    • Cancer Paternal Aunt    • Bipolar disorder Son    • Hypertension Father      Social History     Socioeconomic History   • Marital status:      Spouse name: Not on file   • Number of children: Not on file   • Years of education: Not on file   • Highest education level: Not on file   Occupational History   • Not on file   Social Needs   • Financial resource strain: Not on file   • Food insecurity:     Worry: Not on file     Inability: Not on file   • Transportation needs:     Medical: Not on file     Non-medical: Not on file   Tobacco Use   • Smoking status: Former Smoker     Years: 40.00     Last attempt to quit: 1966     Years since quittin.7   • Smokeless tobacco: Never Used   Substance and Sexual Activity   • Alcohol use: No     Frequency: Never   • Drug use: No   • Sexual activity: Never   Lifestyle   • Physical activity:     Days per week: Not on file     Minutes per session: Not on file   • Stress: Not on file   Relationships   • Social connections:     Talks on phone: Not on file     Gets together: Not on file     Attends Oriental orthodox service: Not on file     Active member of club or organization: Not on file     Attends meetings of clubs or organizations: Not on file     Relationship status: Not on file   • Intimate partner violence:     Fear of current or ex partner: Not on file     Emotionally abused: Not on file     Physically abused: Not on file     Forced sexual activity: Not on file   Other Topics Concern   • Not on file   Social History Narrative   • Not on file         Objective:    Physical Exam:    Visit Vitals  /64 (BP Location: [place]  Patient Position: Sitting, Cuff Size: Regular)   Pulse 86   Wt 99.5 kg   SpO2 95%   BMI 33.83 kg/m²     Physical Exam   Cardiovascular: Normal rate and regular rhythm. Exam reveals no gallop and no friction rub.   No murmur heard.  Pulmonary/Chest: Effort normal and breath sounds normal. No stridor. No respiratory distress. He has no wheezes. He has no rales.   Abdominal: Soft. Bowel sounds are normal. He exhibits no distension and no mass. There is no tenderness. There is no guarding.   Skin: Skin is warm and dry. No rash noted. No erythema.   Psychiatric: He has a normal mood and affect. His behavior is normal. Judgment and thought content normal.         LAB RESULTS:  none    ASSESSMENT:       1. Chest pain, unspecified type    2. Depressed mood        PLAN:   No orders of the defined types were placed in this encounter.    Chest pain resolving.  Continue to follow with cardiology. Unknown if symbicort contributing but now improved.  Monitor closely.    Depression also improved.  No thoughts of harm to self or others. Discussed option including counseling and medications.       Patient aware of concerning signs and symptoms and when to seek appropriate medical care or go to the ED.     The patient indicated understanding of the diagnosis and agreed with the plan of care. Will return to clinic for any new or worsening symptoms.

## 2019-04-30 ENCOUNTER — OFFICE VISIT (OUTPATIENT)
Dept: WOUND CARE | Facility: HOSPITAL | Age: 69
End: 2019-04-30
Attending: NURSE PRACTITIONER
Payer: MEDICARE

## 2019-04-30 ENCOUNTER — PATIENT OUTREACH (OUTPATIENT)
Dept: CASE MANAGEMENT | Age: 69
End: 2019-04-30

## 2019-04-30 DIAGNOSIS — I77.9 BILATERAL CAROTID ARTERY DISEASE, UNSPECIFIED TYPE (HCC): ICD-10-CM

## 2019-04-30 DIAGNOSIS — L97.929 CHRONIC VENOUS HYPERTENSION (IDIOPATHIC) WITH ULCER OF LEFT LOWER EXTREMITY (HCC): Primary | ICD-10-CM

## 2019-04-30 DIAGNOSIS — C50.312 MALIGNANT NEOPLASM OF LOWER-INNER QUADRANT OF LEFT FEMALE BREAST, UNSPECIFIED ESTROGEN RECEPTOR STATUS (HCC): Chronic | ICD-10-CM

## 2019-04-30 DIAGNOSIS — I87.312 CHRONIC VENOUS HYPERTENSION (IDIOPATHIC) WITH ULCER OF LEFT LOWER EXTREMITY (HCC): Primary | ICD-10-CM

## 2019-04-30 DIAGNOSIS — F32.1 MODERATE SINGLE CURRENT EPISODE OF MAJOR DEPRESSIVE DISORDER (HCC): ICD-10-CM

## 2019-04-30 DIAGNOSIS — M81.6 LOCALIZED OSTEOPOROSIS WITHOUT CURRENT PATHOLOGICAL FRACTURE: ICD-10-CM

## 2019-04-30 DIAGNOSIS — I10 ESSENTIAL HYPERTENSION: ICD-10-CM

## 2019-04-30 DIAGNOSIS — Z79.4 TYPE 2 DIABETES MELLITUS WITH DIABETIC NEUROPATHY, WITH LONG-TERM CURRENT USE OF INSULIN (HCC): ICD-10-CM

## 2019-04-30 DIAGNOSIS — G47.33 OBSTRUCTIVE SLEEP APNEA: Chronic | ICD-10-CM

## 2019-04-30 DIAGNOSIS — M47.816 ARTHRITIS OF FACET JOINT OF LUMBAR SPINE: ICD-10-CM

## 2019-04-30 DIAGNOSIS — E78.5 HYPERLIPIDEMIA LDL GOAL <100: Chronic | ICD-10-CM

## 2019-04-30 DIAGNOSIS — E11.40 TYPE 2 DIABETES MELLITUS WITH DIABETIC NEUROPATHY, WITH LONG-TERM CURRENT USE OF INSULIN (HCC): ICD-10-CM

## 2019-04-30 DIAGNOSIS — I73.9 PERIPHERAL VASCULAR DISEASE, UNSPECIFIED (HCC): ICD-10-CM

## 2019-04-30 PROCEDURE — 82962 GLUCOSE BLOOD TEST: CPT

## 2019-04-30 PROCEDURE — 99212 OFFICE O/P EST SF 10 MIN: CPT

## 2019-04-30 PROCEDURE — 99490 CHRNC CARE MGMT STAFF 1ST 20: CPT

## 2019-04-30 NOTE — PROGRESS NOTES
Progress Note Details  Patient Name: La Christensen Date: 4/30/2019   Patient Number: [id number] Physician / Macy Grad: Christina Berry   Patient YOB: 1950 Facility: [facility]    Chief Complaint  This information was ob small vessel disease. Pt never followed up with . [id number] in 9/2018 and managed by Ana Cristina Fuentes stated that there is f/u scheduled. 2/19/19-Afebrile, denies any pain and no s/s of infection. Tolerated compression well.  [facility] not eligible at this congestion, cough and sneezing due to allergies. She will schedule appointment with PCP today. 4/9/19-Afebrile, denies pain and no s/s of infection. Tolerated compression. Wounds have decreased in size and new open blister.  Denies any diarrhea-still clinton like to wait until wounds are healed to order. Pt was instructed to order stockings now.  She also states she had an CTA of bilateral legs completed and will be following up with Dr. Saida Rowley for possible intervention in May.     5/14/18: Pt had Jobst 30-40 m Ulcers  Neurological: Headaches  Hematologic/Lymphatic: Bleeding / Clotting Disorders  Psychiatric: Anxiety, Depression  Prior Wound History: Bleeding, Drainage, Erythema, Malodor, Pain    Additional Information  Medication reconciliation completed at to colors, hair growth, and conditions:   Extremity Color: Pigmented   Hair Growth on Extremity: No   Temperature of Extremity: Warm   Capillary Refill: < 3 seconds   Erythema: No   Dependent Rubor: No   Hyperpigmentation: Yes   Lipodermatosclerosis: No  Righ demonstrated use. Verbalized understanding. Counseled on leg elevation, diuretic adherence and compression to decrease edema   Counseled on adherence to ADA diet, blood sugar monitoring at home and f/u with  as scheduled for DM management.   Electro

## 2019-04-30 NOTE — PROGRESS NOTES
Care plan: Reviewed along with recent wound clinic note. AWV UP TO DATE. All other health maintenance up to date except DEXA. Attempted to contact Chaparro Bradley stated pt unavailable at this time left detailed message for pt to call back.    Time spent: 5 min col

## 2019-05-07 ENCOUNTER — OFFICE VISIT (OUTPATIENT)
Dept: NEUROLOGY | Facility: CLINIC | Age: 69
End: 2019-05-07
Payer: MEDICARE

## 2019-05-07 VITALS
HEART RATE: 72 BPM | SYSTOLIC BLOOD PRESSURE: 156 MMHG | DIASTOLIC BLOOD PRESSURE: 62 MMHG | RESPIRATION RATE: 20 BRPM | BODY MASS INDEX: 32 KG/M2 | WEIGHT: 192 LBS

## 2019-05-07 DIAGNOSIS — I63.9 BRAINSTEM INFARCTION (HCC): Primary | ICD-10-CM

## 2019-05-07 PROCEDURE — 99213 OFFICE O/P EST LOW 20 MIN: CPT | Performed by: OTHER

## 2019-05-07 RX ORDER — CLOPIDOGREL BISULFATE 75 MG/1
75 TABLET ORAL DAILY
Qty: 90 TABLET | Refills: 3 | Status: SHIPPED | OUTPATIENT
Start: 2019-05-07 | End: 2020-05-18

## 2019-05-07 RX ORDER — ATORVASTATIN CALCIUM 40 MG/1
40 TABLET, FILM COATED ORAL NIGHTLY
Qty: 90 TABLET | Refills: 3 | Status: SHIPPED | OUTPATIENT
Start: 2019-05-07 | End: 2020-02-17

## 2019-05-07 NOTE — PROGRESS NOTES
Neurology Outpatient    Niranjan Lulu Patient Status:  No patient class for patient encounter    1950 MRN [id number]   Location ED [facility] Attending No att. providers found   [facility] Day #  PCP Hannah Vu MD     Subjective:  Mar Carlos tablet Rfl: 3   Verapamil HCl  MG Oral Capsule SR 24 Hr Take 240 mg by mouth nightly.  Disp: 90 capsule Rfl: 3   Incontinence Supply Disposable Does not apply Misc  Disp:  Rfl:    Pioglitazone HCl 15 MG Oral Tab Take 1 tablet (15 mg total) by mouth da hypertension     Hyperlipidemia LDL goal <100     Vitamin D deficiency     Lumbar compression fracture (HCC)     Type 2 diabetes mellitus with diabetic neuropathy, with long-term current use of insulin ([address]     Obstructive sleep apnea     Diabetic retinop • Osteoporosis    • Personal history of antineoplastic chemotherapy     2008   • Stented coronary artery    • Stroke [facility])     2016-walks with difficulty- uses cane   • Supraventricular tachycardia ([facility] [address] [facility] 3/31/2014   • Unspecified essential hypertension or ear discharge  Pulmonary: no SOB, no new cough  CV: no chest pain, RLE edema  GI: no constipation or abdominal pain  : denies frequent urination or difficulty urinating   Heme: no new bruising, no unexplained fevers or chills  Endocrine: no unexplaine restricted diffusion. No acute infarct evident. Extensive chronic white matter abnormality, with increased T2/flair signal within the white matter of the subcortical, deep and periventricular cerebrum, and patchy similar signal within the   yoan.  [place] 8/11/16  CONCLUSION:     1. Please refer to the brain MR from 8/8/16 for better visualization of the probable punctate acute infarct within lateral aspect of the right cervicomedullary junction. 2. No acute intracranial hemorrhage or hydrocephalus.   3. Mo

## 2019-05-15 ENCOUNTER — PATIENT OUTREACH (OUTPATIENT)
Dept: CASE MANAGEMENT | Age: 69
End: 2019-05-15

## 2019-05-29 ENCOUNTER — PATIENT OUTREACH (OUTPATIENT)
Dept: CASE MANAGEMENT | Age: 69
End: 2019-05-29

## 2019-06-10 ENCOUNTER — OFFICE VISIT (OUTPATIENT)
Dept: FAMILY MEDICINE CLINIC | Facility: CLINIC | Age: 69
End: 2019-06-10
Payer: MEDICARE

## 2019-06-10 VITALS
HEIGHT: 64 IN | BODY MASS INDEX: 32.27 KG/M2 | WEIGHT: 189 LBS | TEMPERATURE: 97 F | DIASTOLIC BLOOD PRESSURE: 70 MMHG | HEART RATE: 96 BPM | SYSTOLIC BLOOD PRESSURE: 130 MMHG | RESPIRATION RATE: 20 BRPM

## 2019-06-10 DIAGNOSIS — I25.10 CAD, MULTIPLE VESSEL: Chronic | ICD-10-CM

## 2019-06-10 DIAGNOSIS — I10 ESSENTIAL HYPERTENSION: ICD-10-CM

## 2019-06-10 DIAGNOSIS — E78.5 HYPERLIPIDEMIA LDL GOAL <100: Chronic | ICD-10-CM

## 2019-06-10 DIAGNOSIS — E11.40 TYPE 2 DIABETES MELLITUS WITH DIABETIC NEUROPATHY, WITH LONG-TERM CURRENT USE OF INSULIN (HCC): Primary | ICD-10-CM

## 2019-06-10 DIAGNOSIS — Z79.4 TYPE 2 DIABETES MELLITUS WITH DIABETIC NEUROPATHY, WITH LONG-TERM CURRENT USE OF INSULIN (HCC): Primary | ICD-10-CM

## 2019-06-10 DIAGNOSIS — R60.0 LOCALIZED EDEMA: ICD-10-CM

## 2019-06-10 DIAGNOSIS — E55.9 VITAMIN D DEFICIENCY: ICD-10-CM

## 2019-06-10 PROCEDURE — 83036 HEMOGLOBIN GLYCOSYLATED A1C: CPT | Performed by: FAMILY MEDICINE

## 2019-06-10 PROCEDURE — 99214 OFFICE O/P EST MOD 30 MIN: CPT | Performed by: FAMILY MEDICINE

## 2019-06-10 RX ORDER — FUROSEMIDE 20 MG/1
20 TABLET ORAL DAILY
Qty: 90 TABLET | Refills: 3 | Status: SHIPPED | OUTPATIENT
Start: 2019-06-10

## 2019-06-10 RX ORDER — ERGOCALCIFEROL (VITAMIN D2) 1250 MCG
50000 CAPSULE ORAL WEEKLY
Qty: 30 CAPSULE | Refills: 3 | Status: SHIPPED | OUTPATIENT
Start: 2019-06-10 | End: 2021-01-01

## 2019-06-10 RX ORDER — GLIMEPIRIDE 4 MG/1
4 TABLET ORAL
Qty: 90 TABLET | Refills: 3 | Status: SHIPPED | OUTPATIENT
Start: 2019-06-10 | End: 2020-08-21

## 2019-06-10 NOTE — ASSESSMENT & PLAN NOTE
Better control , but not perfects, still hyperglycemic and will work on [address].

## 2019-06-10 NOTE — PROGRESS NOTES
HPI:   Patient presents with:  Diabetes    Benigno Rome is a 80year old female who presents for recheck of her diabetes. Subjective    Better but not great. Imodium regularly QOD and now 2-3 x weekly. Better A1c control. Still lots of edema.    L normal and breath sounds normal. No respiratory distress. She has no wheezes. Abdominal: Soft. Bowel sounds are normal. There is no tenderness. Neurological: She is alert and oriented to person, place, and time. She has normal strength.    Skin: Skin is of insulin ([facility] - Primary    Overview     Dx 1995, Actos 45, metformin 1000 BID, Toujeo  , novolog, stopped glimiperide, needs closer follow up so referred to [facility] 9/59/8994   Complications of retinopathy, PVD and fluctuating BS           Current Assess

## 2019-07-18 ENCOUNTER — TELEPHONE (OUTPATIENT)
Dept: FAMILY MEDICINE CLINIC | Facility: CLINIC | Age: 69
End: 2019-07-18

## 2019-07-18 DIAGNOSIS — E11.40 TYPE 2 DIABETES MELLITUS WITH DIABETIC NEUROPATHY, WITH LONG-TERM CURRENT USE OF INSULIN (HCC): Primary | ICD-10-CM

## 2019-07-18 DIAGNOSIS — Z79.4 TYPE 2 DIABETES MELLITUS WITH DIABETIC NEUROPATHY, WITH LONG-TERM CURRENT USE OF INSULIN (HCC): Primary | ICD-10-CM

## 2019-07-18 NOTE — TELEPHONE ENCOUNTER
Referral request Dr. Savannah Mueller M.D.,Ophthalmology    Patient seen by Dr. Ramila Birch M.D. 6/10/19  Office notes from Dr. Ramila Birch M.D. 6/10/19  Endocrine      Type 2 diabetes mellitus with diabetic neuropathy, with long-term current use of ins

## 2019-08-07 ENCOUNTER — TELEPHONE (OUTPATIENT)
Dept: FAMILY MEDICINE CLINIC | Facility: CLINIC | Age: 69
End: 2019-08-07

## 2019-08-07 NOTE — TELEPHONE ENCOUNTER
Patient requesting to speak to Dr. Luis Clayton  Did not provide reason for call.  Patient stated this is a personal matter

## 2019-08-08 NOTE — TELEPHONE ENCOUNTER
Last Tuesday, she felt awful,  stayed home to care for her and needs a note for work, letter sent under his chart.

## 2019-09-11 DIAGNOSIS — E11.40 TYPE 2 DIABETES, CONTROLLED, WITH NEUROPATHY (HCC): Chronic | ICD-10-CM

## 2019-09-11 DIAGNOSIS — E11.9 TYPE 2 DIABETES MELLITUS WITHOUT COMPLICATION, WITH LONG-TERM CURRENT USE OF INSULIN (HCC): ICD-10-CM

## 2019-09-11 DIAGNOSIS — Z79.4 TYPE 2 DIABETES MELLITUS WITHOUT COMPLICATION, WITH LONG-TERM CURRENT USE OF INSULIN (HCC): ICD-10-CM

## 2019-09-11 RX ORDER — LANCETS
1 EACH MISCELLANEOUS 4 TIMES DAILY
Qty: 1 BOX | Refills: 3 | Status: SHIPPED | OUTPATIENT
Start: 2019-09-11 | End: 2020-09-10

## 2019-09-11 RX ORDER — BLOOD-GLUCOSE METER
EACH MISCELLANEOUS
Qty: 1 KIT | Refills: 3 | Status: SHIPPED | OUTPATIENT
Start: 2019-09-11

## 2019-09-11 NOTE — TELEPHONE ENCOUNTER
Requested Prescriptions     Pending Prescriptions Disp Refills   • Blood Glucose Monitoring Suppl (ACCU-CHEK ANN MARIE PLUS) w/Device Does not apply Kit 1 kit 3     Sig: USE AS DIRECTED   • ACCU-CHEK SOFTCLIX LANCETS Does not apply Misc 1 Box 3     Si lanc

## 2019-09-13 ENCOUNTER — TELEPHONE (OUTPATIENT)
Dept: FAMILY MEDICINE CLINIC | Facility: CLINIC | Age: 69
End: 2019-09-13

## 2019-09-13 NOTE — TELEPHONE ENCOUNTER
Pt states she had an elevated /99 today. Pt checked again later and BP was 148/87. Pt feeling okay- no dizziness, no headache, no SOB, NO chest pains. Pt states she takes all her BP meds- Coreg, Verapamil and Benazepril at bedtime.   Advised that Pt i

## 2019-09-16 PROBLEM — R80.9 TYPE 2 DIABETES MELLITUS WITH MICROALBUMINURIA, WITH LONG-TERM CURRENT USE OF INSULIN (HCC): Status: ACTIVE | Noted: 2019-09-16

## 2019-09-16 PROBLEM — Z23 NEED FOR INFLUENZA VACCINATION: Status: ACTIVE | Noted: 2019-09-16

## 2019-09-16 PROBLEM — Z79.4 TYPE 2 DIABETES MELLITUS WITH MICROALBUMINURIA, WITH LONG-TERM CURRENT USE OF INSULIN (HCC): Status: ACTIVE | Noted: 2019-09-16

## 2019-09-16 PROBLEM — E11.29 TYPE 2 DIABETES MELLITUS WITH MICROALBUMINURIA, WITH LONG-TERM CURRENT USE OF INSULIN (HCC): Status: ACTIVE | Noted: 2019-09-16

## 2019-09-26 ENCOUNTER — HOSPITAL ENCOUNTER (INPATIENT)
Facility: HOSPITAL | Age: 69
LOS: 7 days | Discharge: HOME HEALTH | DRG: 253 | End: 2019-10-03
Attending: EMERGENCY MEDICINE | Admitting: HOSPITALIST
Payer: MEDICARE

## 2019-09-26 ENCOUNTER — APPOINTMENT (OUTPATIENT)
Dept: GENERAL RADIOLOGY | Facility: HOSPITAL | Age: 69
DRG: 253 | End: 2019-09-26
Attending: PHYSICIAN ASSISTANT
Payer: MEDICARE

## 2019-09-26 DIAGNOSIS — T14.8XXA WOUND INFECTION: ICD-10-CM

## 2019-09-26 DIAGNOSIS — I96 GANGRENOUS TOE (HCC): Primary | ICD-10-CM

## 2019-09-26 DIAGNOSIS — I96 GANGRENE OF TOE OF RIGHT FOOT (HCC): ICD-10-CM

## 2019-09-26 DIAGNOSIS — M86.9 OSTEOMYELITIS OF TOE OF RIGHT FOOT (HCC): ICD-10-CM

## 2019-09-26 DIAGNOSIS — L08.9 WOUND INFECTION: ICD-10-CM

## 2019-09-26 PROBLEM — L02.619 CELLULITIS AND ABSCESS OF FOOT: Status: ACTIVE | Noted: 2019-09-26

## 2019-09-26 PROBLEM — R73.9 HYPERGLYCEMIA: Status: ACTIVE | Noted: 2019-09-26

## 2019-09-26 PROBLEM — L03.119 CELLULITIS AND ABSCESS OF FOOT: Status: ACTIVE | Noted: 2019-09-26

## 2019-09-26 PROBLEM — I67.9 CEREBROVASCULAR DISEASE: Status: ACTIVE | Noted: 2017-02-21

## 2019-09-26 PROBLEM — R79.89 AZOTEMIA: Status: ACTIVE | Noted: 2019-09-26

## 2019-09-26 PROBLEM — D64.9 ANEMIA: Status: ACTIVE | Noted: 2019-09-26

## 2019-09-26 PROCEDURE — 73660 X-RAY EXAM OF TOE(S): CPT | Performed by: PHYSICIAN ASSISTANT

## 2019-09-26 PROCEDURE — 99223 1ST HOSP IP/OBS HIGH 75: CPT | Performed by: HOSPITALIST

## 2019-09-26 RX ORDER — ATORVASTATIN CALCIUM 40 MG/1
40 TABLET, FILM COATED ORAL NIGHTLY
Status: DISCONTINUED | OUTPATIENT
Start: 2019-09-26 | End: 2019-10-03

## 2019-09-26 RX ORDER — DEXTROSE MONOHYDRATE 25 G/50ML
50 INJECTION, SOLUTION INTRAVENOUS
Status: DISCONTINUED | OUTPATIENT
Start: 2019-09-26 | End: 2019-10-03

## 2019-09-26 RX ORDER — VERAPAMIL HYDROCHLORIDE 240 MG/1
240 TABLET, FILM COATED, EXTENDED RELEASE ORAL NIGHTLY
Status: DISCONTINUED | OUTPATIENT
Start: 2019-09-26 | End: 2019-10-03

## 2019-09-26 RX ORDER — ONDANSETRON 2 MG/ML
4 INJECTION INTRAMUSCULAR; INTRAVENOUS EVERY 6 HOURS PRN
Status: DISCONTINUED | OUTPATIENT
Start: 2019-09-26 | End: 2019-10-03

## 2019-09-26 RX ORDER — CARVEDILOL 12.5 MG/1
12.5 TABLET ORAL 2 TIMES DAILY WITH MEALS
Status: DISCONTINUED | OUTPATIENT
Start: 2019-09-27 | End: 2019-10-03

## 2019-09-26 RX ORDER — ACETAMINOPHEN 325 MG/1
650 TABLET ORAL EVERY 6 HOURS PRN
Status: DISCONTINUED | OUTPATIENT
Start: 2019-09-26 | End: 2019-10-03

## 2019-09-26 RX ORDER — FUROSEMIDE 20 MG/1
20 TABLET ORAL DAILY
Status: DISCONTINUED | OUTPATIENT
Start: 2019-09-27 | End: 2019-10-03

## 2019-09-26 RX ORDER — HYDRALAZINE HYDROCHLORIDE 20 MG/ML
10 INJECTION INTRAMUSCULAR; INTRAVENOUS EVERY 6 HOURS PRN
Status: DISCONTINUED | OUTPATIENT
Start: 2019-09-26 | End: 2019-10-03

## 2019-09-26 RX ORDER — LISINOPRIL 10 MG/1
20 TABLET ORAL DAILY
Status: DISCONTINUED | OUTPATIENT
Start: 2019-09-27 | End: 2019-10-03

## 2019-09-26 RX ORDER — ENOXAPARIN SODIUM 100 MG/ML
40 INJECTION SUBCUTANEOUS NIGHTLY
Status: DISCONTINUED | OUTPATIENT
Start: 2019-09-26 | End: 2019-10-03

## 2019-09-26 NOTE — ED INITIAL ASSESSMENT (HPI)
Patient presents for evaluation of wound on right foot. Patient saw her PMD today and was sent here for further workup. She has not been on any antibiotics for this wound. Patient states she first noticed a foul smell a few days ago.  The wound is on her ri

## 2019-09-26 NOTE — H&P
[facility]  History and Physical     Miley Leisure Patient Status:  Emergency    1950 MRN [id number]   Location [address] Attending Marya Ham MD   Hosp Day # 0 PCP Noel Pires MD     Chief Complai • Personal history of antineoplastic chemotherapy     2008   • Stented coronary artery    • Stroke [facility])     2016-walks with difficulty- uses cane   • Supraventricular tachycardia ([facility] [address] 3/31/2014   • Unspecified essential hypertension    • Unspecified sl DIZZINESS    Medications:    No current facility-administered medications on file prior to encounter.    Current Outpatient Medications on File Prior to Encounter:  Blood Glucose Monitoring Suppl (ACCU-CHEK ANN MARIE PLUS) w/Device Does not apply Kit USE AS DIR MONITOR Disp: 3 each Rfl: 3   BENAZEPRIL HCL 20 MG Oral Tab TAKE 1 TABLET (20 MG TOTAL) BY MOUTH DAILY.  Disp: 90 tablet Rfl: 3   ACETAMINOPHEN-CODEINE #3 300-30 MG Oral Tab TAKE ONE TABLET BY MOUTH EVERY 4 TO 6 HOURS AS NEEDED FOR PAIN Disp: 30 tablet Rfl: last 168 hours. Imaging: Imaging data reviewed in Epic. ASSESSMENT / PLAN:     1. Wet gangrene right 3rd toe, possible osteomyelitis   1. Wound and blood cultures obtained  2. Check ESR/CRP  3. Empiric antibiotics, ID consulted  4.  Podiatry consult

## 2019-09-26 NOTE — ED PROVIDER NOTES
Patient Seen in: [facility] Emergency Department      History   Patient presents with:  Cellulitis (integumentary, infectious)    Stated Complaint: right toe cellulitis    HPI    CHIEF COMPLAINT: Right third toe infection    HISTORY OF PRESENT [place] • Easy bruising    • Exposure to medical diagnostic radiation     2008   • Facial cellulitis 4/14/2015   • Fatigue    • Heart attack ([facility]     12/6/2013   • High cholesterol    • Leaking of urine    • Leg swelling    • Neuropathy    • NSTEMI (non-ST elevat All other systems reviewed and negative except as noted above.     Physical Exam     ED Triage Vitals [09/26/19 1601]   BP (!) 185/84   Pulse 85   Resp 18   Temp 98.3 °F (36.8 °C)   Temp src Temporal   SpO2 97 %   O2 Device None (Room air)       Current:BP Please view results for these tests on the individual orders.    RAINBOW DRAW BLUE   RAINBOW DRAW LAVENDER   RAINBOW DRAW LIGHT GREEN   RAINBOW DRAW GOLD   BLOOD CULTURE   BLOOD CULTURE   AEROBIC BACTERIAL CULTURE     Patient IV established and labs are Snehal Fernando

## 2019-09-27 ENCOUNTER — TELEPHONE (OUTPATIENT)
Dept: FAMILY MEDICINE CLINIC | Facility: CLINIC | Age: 69
End: 2019-09-27

## 2019-09-27 ENCOUNTER — APPOINTMENT (OUTPATIENT)
Dept: ULTRASOUND IMAGING | Facility: HOSPITAL | Age: 69
DRG: 253 | End: 2019-09-27
Attending: HOSPITALIST
Payer: MEDICARE

## 2019-09-27 DIAGNOSIS — G47.33 OSA (OBSTRUCTIVE SLEEP APNEA): Primary | ICD-10-CM

## 2019-09-27 DIAGNOSIS — I96 WET GANGRENE (HCC): Primary | ICD-10-CM

## 2019-09-27 PROCEDURE — 93926 LOWER EXTREMITY STUDY: CPT | Performed by: HOSPITALIST

## 2019-09-27 PROCEDURE — 99232 SBSQ HOSP IP/OBS MODERATE 35: CPT | Performed by: HOSPITALIST

## 2019-09-27 NOTE — CONSULTS
Vascular Surgery  New Patient Consultation    Name: Gabriele Lipoma  MRN: [id number]  : 1950    Referring Provider: No ref.  provider found    CC: right 3rd toe wound    HPI: 59-year-old female with history of hypertension, hyperlipidemia, mo • Neuropathy    • NSTEMI (non-ST elevated myocardial infarction) ([facility] [facility] 12/6/2013   • Obstructive sleep apnea    • Onychomycosis 2/2/2017   • HARVEY (obstructive sleep apnea) 04/08/18 Split Night    AHI 58 Sao2 Oits 72%   • Osteoporosis    • Personal history Insulin Pen Needle (BD PEN NEEDLE ADALID U/F) 32G X 4 MM Does not apply Misc Use Once Daily With Insulin. Disp: 100 each Rfl: 0   ergocalciferol 75622 units Oral Cap Take 1 capsule (50,000 Units total) by mouth once a week.  Disp: 30 capsule Rfl: 3   furosemi Multiple Vitamin (ONE-DAILY MULTI VITAMINS) Oral Tab Take 1 tablet by mouth daily.  Disp:  Rfl:        Social History    Socioeconomic History      Marital status:       Spouse name: Not on file      Number of children: 2      Years of education: Not GI: No nausea, vomiting or diarrhea. No blood in stools. Neurologic: No seizures, tremors, weakness or numbness.      Physical Examination   09/26/19  2230 09/26/19  2316 09/27/19  0446 09/27/19  0748   BP: 156/80 (!) 180/81 116/60 117/55   BP Location:  R 80-year-old female who presents with newly acquired right third toe wound with associated cellulitis of the foot. She has been admitted for initiation of IV antibiotics and podiatry and vascular surgery on consult.   On my evaluation, she has reasonable Do

## 2019-09-27 NOTE — TELEPHONE ENCOUNTER
Referral request Dr. Vincent Moore M.D.,Pulmonology    Patient seen by Dr. Idania Busch M.D. 8/16/18  Office notes from Dr. Idania Busch M.D. 8/16/18  HARVEY (obstructive sleep apnea)

## 2019-09-27 NOTE — PAYOR COMM NOTE
--------------  ADMISSION REVIEW     Macismael Mcguirelynda [place]  [place] #:  [id number]  Authorization Number: [id number]    Admit date: 9/26/19  Admit time: 2315       Admitting Physician: Yanick Cobian DO  Attending Physician:  Yanick Cobian DO  Primary C Procedure Laterality Date   • ANGIOPLASTY (CORONARY)  2005    1 stent per Dr Jing Frazier   • BREAST SURGERY      left lumpectomy   • CATH DRUG ELUTING STENT     • CATH PERCUTANEOUS  TRANSLUMINAL CORONARY ANGIOPLASTY  1/1/05   • CHEMOTHERAPY  2008   • CHOLECYS Wound infection      [facility]  History and Physical     Angelayaneli Muro Patient Status:  Emergency    1950 MRN [id number]   Location [address] [facility] Attending Renée Hoyt MD   [place] Day # 0 [place], [place] BP (!) 185/84   Pulse 85   Temp 98.3 °F (36.8 °C) (Temporal)   Resp 18   Ht 162.6 cm (5' 4\")   Wt 190 lb (86.2 kg)   SpO2 97%   BMI 32.61     Extremities: B/L LE edema   Integument: Wet gangrene right 3rd toe     Lab 09/26/19  1624   WBC 8.5   HGB 10.4*   Aerobic Smear 1+ Gram Positive Rods N/A     Aerobic Smear 3+ Gram positive cocci in pairs and clusters N/A         ASSESSMENT/PLAN:  1.  Gangrene right 3rd toe, foot cellulitis  -odor, no drainage site cx  Vascular workup up, US pending  Amputation once v 50-year-old female who presents with newly acquired right third toe wound with associated cellulitis of the foot. She has been admitted for initiation of IV antibiotics and podiatry and vascular surgery on consult.   On my evaluation, she has reasonable Do Piperacillin Sod-Tazobactam So (ZOSYN) 3.375 g in dextrose 5 % 100 mL ADD-vantage     Date Action Dose Route User    9/27/2019 0825 New Bag 3.375 g Intravenous Cecille Pollock RN    9/27/2019 0037 New Bag 3.375 g Intravenous Buddy Weston RN      vancomycin

## 2019-09-27 NOTE — PLAN OF CARE
Assumed care at 2200. Pt is A&O x4. On RA. No tele. VSS. Receives Lovenox. Denies pain. Standby assist, ambulates via cane. IV abx zosyn and PO vanco prophylactically c.diff. Saline locked.    Port a cath on right upper chest, not function

## 2019-09-27 NOTE — CONSULTS
Zaki Corral 77year old female admitted for right third toe infection. Patient was seen at Dr. Nereida Cuellar office yesterday and presented with gangrene of right third toe, cellulitis and malodor.   She was sent in for admission to the hospital.  Caryn Clayton • Unspecified sleep apnea    • Visual impairment    • Wears glasses      Past Surgical History:   Procedure Laterality Date   • ANGIOPLASTY (CORONARY)  2005    1 stent per Dr Diego Bentley   • BREAST SURGERY      left lumpectomy   • BREAST SURGERY PROCEDURE U Substance and Sexual Activity      Alcohol use: No        Frequency: Never      Drug use: No      Sexual activity: Not on file    Lifestyle      Physical activity:        Days per week: Not on file        Minutes per session: Not on file      Stress: Not o palpitations, chest pain, night cramps  Gastrointestinal: negative for - abdominal pain, diarrhea, heartburn, nausea  Musculoskeletal: negative for - foot pain, ankle pain, joint pain, muscle cramps, muscle weakness  Neurological: negative for - tremors, m applied  Weightbearing as tolerated in surgical shoe  Elevate right lower extremity  Continue IV antibiotics per ID  Follow-up arterial ultrasound  Possible lower extremity angiogram per vascular  Patient will likely need amputation of the third toe pendin

## 2019-09-27 NOTE — CONSULTS
INFECTIOUS DISEASE [address] Patient Status:  Inpatient    1950 MRN [id number]   [facility] [address] Attending Reilly Bridges, [address] [facility] Day # 1 PCP Timmy Harkins • Supraventricular tachycardia ([facility] [address] 3/31/2014   • Unspecified essential hypertension    • Unspecified sleep apnea    • Visual impairment    • Wears glasses      Past Surgical History:   Procedure Laterality Date   • ANGIOPLASTY (CORONARY)  2005    1 stent •  carvedilol (COREG) tab 12.5 mg, 12.5 mg, Oral, BID with meals  •  furosemide (LASIX) tab 20 mg, 20 mg, Oral, Daily  •  insulin detemir (LEVEMIR) 100 UNIT/ML flextouch 16 Units, 16 Units, Subcutaneous, Nightly  •  Verapamil HCl ER (CALAN-SR) CR tab 240 m furosemide 20 MG Oral Tab Take 1 tablet (20 mg total) by mouth daily.  Disp: 90 tablet Rfl: 3   glimepiride 4 MG Oral Tab Take 1 tablet (4 mg total) by mouth every morning before breakfast. Disp: 90 tablet Rfl: 3   insulin glargine (TOUJEO SOLOSTAR) 300 UNI General: No acute distress. Alert and oriented x 3. Vital signs: Temp:  [98.1 °F (36.7 °C)-99 °F (37.2 °C)] 99 °F (37.2 °C)  Pulse:  [73-85] 73  Resp:  [16-18] 18  BP: (116-185)/(55-85) 117/55  HEENT: Moist mucous membranes.  Extraocular muscles are intact Coronary artery disease involving native heart without angina pectoris     Hyponatremia     Truncal ataxia     Brainstem infarction (HCC)     Benign meningioma (HCC)     Cerebral atherosclerosis     Chronic ulcer of ankle (HCC)     Moderate single curre

## 2019-09-27 NOTE — TELEPHONE ENCOUNTER
Referral request Dr. Melissa Espinoza DPM    Patient was in the ER 9/26/2019  Office notes from Dr. Sun Isbell DO  1. Wet gangrene right 3rd toe, possible osteomyelitis   1. Wound and blood cultures obtained  2. Check ESR/CRP  3.  Empiric antibiotics, ID co

## 2019-09-27 NOTE — PLAN OF CARE
Pt alert and oriented. C/o pain to R toe, declined pain meds. VSS. Afebrile. RA.  IV abx. PO vanco.  BG monitored. Wound to third R toe, open to air. No drainage noted. Vascular surgery at bedside. Plan for arterial duplex RLE.   Will monitor closely Assess and document risk factors for pressure ulcer development  - Assess and document skin integrity  - Assess and document dressing/incision, wound bed, drain sites and surrounding tissue  - Implement wound care per orders  - Initiate isolation precautio

## 2019-09-28 PROCEDURE — 99232 SBSQ HOSP IP/OBS MODERATE 35: CPT | Performed by: HOSPITALIST

## 2019-09-28 NOTE — PROGRESS NOTES
Zaki Corral seen at bedside in [facility]. No acute events overnight. Doing well.     ROS  Constitutional: negative for - fever, chills, weight change  Integument: Right foot erythema, gangrene third toe  Respiratory: negative for - wheezing, SOB, chest Enterococcus faecalis    A/P: 77year old female with diabetic neuropathy, right third toe gangrene and osteomyelitis, right foot cellulitis    Afebrile, AVSS  No leukocytosis however elevated ESR CRP  Gangrene of third toe with osteomyelitis of distal pha

## 2019-09-28 NOTE — PROGRESS NOTES
[facility]  Progress Note     Eugene Law Patient Status:  Inpatient    1950 MRN [id number]   [facility] [address] Attending Ke Conway, [address] [facility] Day # 2 PCP Ramila Birch MD     Chief Complaint: rt third toe gangrene furosemide  20 mg Oral Daily   • insulin detemir  16 Units Subcutaneous Nightly   • Verapamil HCl ER  240 mg Oral Nightly   • enoxaparin  40 mg Subcutaneous Nightly   • piperacillin-tazobactam  3.375 g Intravenous Q8H   • vancomycin HCl  125 mg Oral Daily

## 2019-09-28 NOTE — PLAN OF CARE
Patient is A&O x4.  Calm and cooperative. L arm precautions. VSS. On tele-NSR. On Ra. HARVEY-CPAP at night. IV abx. Denies any pain or discomfort.   Right 3rd toe dressing reinforced-Necrotic tissue to toe, redness and swelling going up foot, foul odo algorithm/standards of care as needed  Outcome: Progressing  Goal: Incision(s), wounds(s) or drain site(s) healing without S/S of infection  Description  INTERVENTIONS:  - Assess and document risk factors for pressure ulcer development  - Assess and docume

## 2019-09-28 NOTE — RESPIRATORY THERAPY NOTE
HARVEY Equipment Usage Summary :            Set Mode : CPAP W FLEX          Usage in Hours:2;57          90% Pressure (EPAP) : 15           90% Insp Pressure (IPAP);           AHI : 3.3          Supplemental Oxygen :  2    LPM

## 2019-09-28 NOTE — PROGRESS NOTES
No complaints  Reviewed noninvasive, toe pressure is low  Doppler biphasic dp    Will plan angiogram on Monday

## 2019-09-28 NOTE — PROGRESS NOTES
INFECTIOUS DISEASE PROGRESS NOTE    Daria Bae Patient Status:  Inpatient    1950 MRN [id number]   [facility] 3NE-A Attending Thierno Barry, [address] [facility] Day # 2 PCP Bharath Wayne Current Outpatient Medications on File Prior to Encounter:  Blood Glucose Monitoring Suppl (ACCU-CHEK ANN MARIE PLUS) w/Device Does not apply Kit USE AS DIRECTED Disp: 1 kit Rfl: 3   ACCU-CHEK SOFTCLIX LANCETS Does not apply Misc 1 lancet by Finger stick route BENAZEPRIL HCL 20 MG Oral Tab TAKE 1 TABLET (20 MG TOTAL) BY MOUTH DAILY.  Disp: 90 tablet Rfl: 3   ACETAMINOPHEN-CODEINE #3 300-30 MG Oral Tab TAKE ONE TABLET BY MOUTH EVERY 4 TO 6 HOURS AS NEEDED FOR PAIN Disp: 30 tablet Rfl: 0   NOVOFINE 30G X 8 MM Does Mixture of organisms suggestive of normal skin alba    Aerobic Culture Result 4+ growth Pasteurella multocida (A) N/A    Aerobic Culture Result 4+ growth Enterococcus faecalis (A) N/A    Aerobic Smear 1+ WBCs seen N/A    Aerobic Smear 3+ Gram Negative R Wet gangrene (HCC)     Osteomyelitis of right foot (HCC)     Wound infection      ASSESSMENT/PLAN:  1. Gangrene right 3rd toe, foot cellulitis  -culture growing Pasteurella and E.  Faecalis (patient does have pet dog at home)  Vascular evaluation in prog

## 2019-09-29 PROCEDURE — 99232 SBSQ HOSP IP/OBS MODERATE 35: CPT | Performed by: HOSPITALIST

## 2019-09-29 NOTE — PLAN OF CARE
Patient is A&O x4.  Calm and cooperative. L arm precautions. VSS. On tele-NSR. On Ra. HARVEY-CPAP at night. IV abx. C/o burning to wound, tylenol prn given. Right 3rd toe dressing reinforced. Surgical shoe applied when up. Electrolyte protocol.   Ac Monitor for areas of redness and/or skin breakdown  - Initiate interventions, skin care algorithm/standards of care as needed  9/29/2019 0319 by Pablo Sanchez, RN  Outcome: Progressing  9/29/2019 0319 by Pablo Sanchez, RN  Outcome: Michaela Kessler

## 2019-09-29 NOTE — PROGRESS NOTES
No complaints  Doing well   Gangrene unchanged  Discussed angio   Added to tomorrow schedule for myself or Dr Tristian Méndez

## 2019-09-29 NOTE — PROGRESS NOTES
[facility] HOSPITALIST  Progress Note     Alberto Pendleton Patient Status:  Inpatient    1950 MRN [id number]   [facility] 3NE-A Attending Janette Hernandez, [address] [facility] Day # 3 PCP David Christianson MD     Chief Complaint: rt third toe gangrene furosemide  20 mg Oral Daily   • insulin detemir  16 Units Subcutaneous Nightly   • Verapamil HCl ER  240 mg Oral Nightly   • enoxaparin  40 mg Subcutaneous Nightly   • piperacillin-tazobactam  3.375 g Intravenous Q8H   • vancomycin HCl  125 mg Oral Daily

## 2019-09-29 NOTE — PLAN OF CARE
Assumed care at 0730. A&O x 4. On RA tolerating well. NSR on tele. Dressing to foot is clean dry and intact. Planned angiogram on Monday 9/30 in the afternoon. PT recommends home with home Pt. Staff will continue to monitor.      NPO at 1000 9/30 for angiog Assess and document skin integrity  - Assess and document dressing/incision, wound bed, drain sites and surrounding tissue  - Implement wound care per orders  - Initiate isolation precautions as appropriate  - Initiate Pressure Ulcer prevention bundle as i

## 2019-09-29 NOTE — PHYSICAL THERAPY NOTE
PHYSICAL THERAPY EVALUATION - INPATIENT     Room Number: 3829/9432-F  Evaluation Date: 9/29/2019  Type of Evaluation: Initial  Physician Order: PT Eval and Treat    Presenting Problem: R 3rd toe wound  Reason for Therapy: Mobility Dysfunction and Dis history of antineoplastic chemotherapy     2008   • Stented coronary artery    • Stroke [facility])     2016-walks with difficulty- uses cane   • Supraventricular tachycardia ([facility] [address] 3/31/2014   • Unspecified essential hypertension    • Unspecified sleep apnea PAIN ASSESSMENT  Rating: 3  Location: back  Management Techniques: Activity promotion; Body mechanics    COGNITION  · Overall Cognitive Status:  WFL - within functional limits    RANGE OF MOTION AND STRENGTH ASSESSMENT  Upper extremity ROM and strength demonstrates good sitting balance. Pt requesting to use bathroom. Pt sit-stand with quad cane and supervision. Pt ambulates with poor safety awareness, wide DAMON, and antalgic gait pattern. Pt performed toilet transfer with supervision.  Pt provided RW for i further address above mentioned impairments and functional mobility deficits in order to return to independent prior level of function.      DISCHARGE RECOMMENDATIONS  PT Discharge Recommendations: Home with home health PT    PLAN  PT Treatment Plan: Tee

## 2019-09-29 NOTE — PROGRESS NOTES
09/28/19 2007   BiPAP   $ RT Standby Charge (per 15 min) 1   $ HARVEY Follow up charge Yes   Device RemStar - CPAP   Mode Spontaneous   Interface Nasal mask   Mask Size Medium   Control Settings   Set CPAP 15   SIPAP   Resp 18   BiPAP/CPAP Monitored Parame

## 2019-09-29 NOTE — RESPIRATORY THERAPY NOTE
HARVEY - Equipment Use Daily Summary:  · Set Mode CPAP  · Usage in hours: 8:06  · 90% Pressure (EPAP) level: 15.0  · 90% Insp Pressure (IPAP):   · AHI: 4.8  · Supplemental Oxygen:   · Comments:

## 2019-09-30 ENCOUNTER — TELEPHONE (OUTPATIENT)
Dept: FAMILY MEDICINE CLINIC | Facility: CLINIC | Age: 69
End: 2019-09-30

## 2019-09-30 ENCOUNTER — APPOINTMENT (OUTPATIENT)
Dept: INTERVENTIONAL RADIOLOGY/VASCULAR | Facility: HOSPITAL | Age: 69
DRG: 253 | End: 2019-09-30
Attending: SURGERY
Payer: MEDICARE

## 2019-09-30 PROCEDURE — B41C1ZZ FLUOROSCOPY OF PELVIC ARTERIES USING LOW OSMOLAR CONTRAST: ICD-10-PCS | Performed by: SURGERY

## 2019-09-30 PROCEDURE — 047P3ZZ DILATION OF RIGHT ANTERIOR TIBIAL ARTERY, PERCUTANEOUS APPROACH: ICD-10-PCS | Performed by: SURGERY

## 2019-09-30 PROCEDURE — 047T3ZZ DILATION OF RIGHT PERONEAL ARTERY, PERCUTANEOUS APPROACH: ICD-10-PCS | Performed by: SURGERY

## 2019-09-30 PROCEDURE — 047K3ZZ DILATION OF RIGHT FEMORAL ARTERY, PERCUTANEOUS APPROACH: ICD-10-PCS | Performed by: SURGERY

## 2019-09-30 PROCEDURE — B41F1ZZ FLUOROSCOPY OF RIGHT LOWER EXTREMITY ARTERIES USING LOW OSMOLAR CONTRAST: ICD-10-PCS | Performed by: SURGERY

## 2019-09-30 PROCEDURE — 99232 SBSQ HOSP IP/OBS MODERATE 35: CPT | Performed by: HOSPITALIST

## 2019-09-30 PROCEDURE — B4101ZZ FLUOROSCOPY OF ABDOMINAL AORTA USING LOW OSMOLAR CONTRAST: ICD-10-PCS | Performed by: SURGERY

## 2019-09-30 RX ORDER — LIDOCAINE HYDROCHLORIDE 10 MG/ML
INJECTION, SOLUTION EPIDURAL; INFILTRATION; INTRACAUDAL; PERINEURAL
Status: COMPLETED
Start: 2019-09-30 | End: 2019-09-30

## 2019-09-30 RX ORDER — HEPARIN SODIUM 5000 [USP'U]/ML
INJECTION, SOLUTION INTRAVENOUS; SUBCUTANEOUS
Status: COMPLETED
Start: 2019-09-30 | End: 2019-09-30

## 2019-09-30 RX ORDER — HYDRALAZINE HYDROCHLORIDE 20 MG/ML
INJECTION INTRAMUSCULAR; INTRAVENOUS
Status: COMPLETED
Start: 2019-09-30 | End: 2019-09-30

## 2019-09-30 RX ORDER — CLOPIDOGREL BISULFATE 75 MG/1
75 TABLET ORAL DAILY
Status: DISCONTINUED | OUTPATIENT
Start: 2019-10-01 | End: 2019-10-01 | Stop reason: HOSPADM

## 2019-09-30 RX ORDER — CLOPIDOGREL BISULFATE 75 MG/1
TABLET ORAL
Status: COMPLETED
Start: 2019-09-30 | End: 2019-09-30

## 2019-09-30 RX ORDER — CLOPIDOGREL BISULFATE 75 MG/1
300 TABLET ORAL ONCE
Status: COMPLETED | OUTPATIENT
Start: 2019-09-30 | End: 2019-09-30

## 2019-09-30 RX ORDER — LOPERAMIDE HYDROCHLORIDE 2 MG/1
2 CAPSULE ORAL 4 TIMES DAILY PRN
Status: DISCONTINUED | OUTPATIENT
Start: 2019-09-30 | End: 2019-10-03

## 2019-09-30 RX ORDER — MIDAZOLAM HYDROCHLORIDE 1 MG/ML
INJECTION INTRAMUSCULAR; INTRAVENOUS
Status: COMPLETED
Start: 2019-09-30 | End: 2019-09-30

## 2019-09-30 NOTE — PLAN OF CARE
Resumed care at 1930  Pt A&Ox4 calm and cooperative  VS WNL, Ra, CPAP at noc, NSR  Electrolyte protocol  Plavix on hold d/t procedure today  Angiogram scheduled for this pm, consent signed and on chart  NPO after 10am  Lovenox held at [facility] for procedure  Ac hematoma  - Assess quality of pulses, skin color and temperature  - Assess for signs of decreased coronary artery perfusion - ex.  Angina  - Evaluate fluid balance, assess for edema, trend weights  Outcome: Progressing     Problem: SKIN/TISSUE INTEGRITY - A

## 2019-09-30 NOTE — PROGRESS NOTES
[place] seen at bedside in [facility]. No acute events overnight. Doing well.     ROS  Constitutional: negative for - fever, chills, weight change  Integument: Right foot erythema, gangrene third toe  Respiratory: negative for - wheezing, SOB, chest old female with diabetic neuropathy, right third toe gangrene and osteomyelitis, right foot cellulitis    Afebrile, AVSS  No leukocytosis however elevated ESR CRP  Gangrene of third toe with osteomyelitis of distal phalanx  Betadine soaked dressings applie

## 2019-09-30 NOTE — HOME CARE LIAISON
Met with patient at the bedside to offer home health choice. Patient is agreeable to [facility]. Residential brochure provided with contact information. All questions addressed and answered.

## 2019-09-30 NOTE — TELEPHONE ENCOUNTER
Ruba Vidal from [facility] is calling to confirm if Dr. Macho Washington will be signing orders for home nurse and physical therapy. Ruba Vidal is not sure when patient will be discharged from hospital for wound infection.

## 2019-09-30 NOTE — DIETARY NOTE
[place] [address]     Admitting diagnosis:  Wound infection [T14. 8XXA, L08.9]  Gangrenous toe (HCC) [I96]    Ht: 162.6 cm (5' 4\")  Wt: 81.7 kg (180 lb 1.6 oz).  This is 150 % of IBW  Body mass index is 30.91 kg/m

## 2019-09-30 NOTE — PROGRESS NOTES
[facility] HOSPITALIST  Progress Note     Clarenceleoncio Ham Patient Status:  Inpatient    1950 MRN [id number]   [facility] 3NE-A Attending Deandre Reyes, [address] [facility] Day # 4 PCP Ilsa Roberson MD     Chief Complaint: rt third toe gangrene furosemide  20 mg Oral Daily   • insulin detemir  16 Units Subcutaneous Nightly   • Verapamil HCl ER  240 mg Oral Nightly   • enoxaparin  40 mg Subcutaneous Nightly   • piperacillin-tazobactam  3.375 g Intravenous Q8H   • vancomycin HCl  125 mg Oral Daily

## 2019-09-30 NOTE — RESPIRATORY THERAPY NOTE
HARVEY : EQUIPMENT USE: DAILY SUMMARY                                            SET MODE:  CPAP WITH CFLEX                                          USAGE IN HOURS:7:46                                          90% EXP

## 2019-09-30 NOTE — CM/SW NOTE
10:14am   MSW paged [address] care with referral.  Liaison will meet with the patient/familyto provide choice, explanation of services, and financial disclosure.     Residential home healthcare referral pending      1:50pm  Pt agreeable to re

## 2019-09-30 NOTE — PROGRESS NOTES
[facility]                INFECTIOUS DISEASE PROGRESS NOTE    Juan Santos Patient Status:  Inpatient    1950 MRN [id number]   [facility] 3NE-A Attending [facility] Day # 4 PCP Sheldon Sadnra MD Aerobic Culture Result 1+ growth Gram Negative Jose F (A) N/A    Aerobic Smear 1+ WBCs seen N/A    Aerobic Smear 3+ Gram Negative Rods N/A    Aerobic Smear 1+ Gram Positive Rods N/A    Aerobic Smear 3+ Gram positive cocci in pairs and clusters N/A       Susce current use of insulin ([facility] [address]     Need for influenza vaccination     Cellulitis and abscess of foot     Gangrenous toe ([facility] [address]     Anemia     Azotemia     Hyperglycemia     Wet gangrene (HCC)     Osteomyelitis of right foot (HCC)     Wound infection      ASSE

## 2019-09-30 NOTE — PHYSICAL THERAPY NOTE
PHYSICAL THERAPY TREATMENT NOTE - INPATIENT    Room Number: 7780/1695-Q     Session: 1   Number of Visits to Meet Established Goals: 3    Presenting Problem: R 3rd toe wound    Problem List  Principal Problem:    Gangrenous toe ([address]  Active Problems: Procedure Laterality Date   • ANGIOPLASTY (CORONARY)      1 stent per Dr Kaya Rosales   • BREAST SURGERY      left lumpectomy   • BREAST SURGERY PROCEDURE UNLISTED  08   •   ,    • CATARACT     • CATARACT EXTRACTION Left    • CATH  another person does the patient currently need. ..   -   Moving to and from a bed to a chair (including a wheelchair)?: None   -   Need to walk in hospital room?: A Little   -   Climbing 3-5 steps with a railing?: A Little       AM-PAC Score:  Raw Score: 22 AM for bed mobility, transfers, gait training and BLE strengthening. Pt with improved activity tolerance this session, when compared to previous session. Recommend one more session after completion of angiogram, and possible amputation per RN.    At this

## 2019-09-30 NOTE — BRIEF OP NOTE
Vascular Surgery Brief Op Note  Patients Name:    Clarence Ham    Operating Physician: Sameera Goldstein MD  CSN:    [id number]                                                           Location:  OR  MRN:     [id number]

## 2019-10-01 ENCOUNTER — APPOINTMENT (OUTPATIENT)
Dept: GENERAL RADIOLOGY | Facility: HOSPITAL | Age: 69
DRG: 253 | End: 2019-10-01
Attending: PODIATRIST
Payer: MEDICARE

## 2019-10-01 ENCOUNTER — ANESTHESIA (OUTPATIENT)
Dept: SURGERY | Facility: HOSPITAL | Age: 69
DRG: 253 | End: 2019-10-01
Payer: MEDICARE

## 2019-10-01 ENCOUNTER — ANESTHESIA EVENT (OUTPATIENT)
Dept: SURGERY | Facility: HOSPITAL | Age: 69
DRG: 253 | End: 2019-10-01
Payer: MEDICARE

## 2019-10-01 PROCEDURE — 99232 SBSQ HOSP IP/OBS MODERATE 35: CPT | Performed by: HOSPITALIST

## 2019-10-01 PROCEDURE — 73630 X-RAY EXAM OF FOOT: CPT | Performed by: PODIATRIST

## 2019-10-01 PROCEDURE — 0Y6T0Z0 DETACHMENT AT RIGHT 3RD TOE, COMPLETE, OPEN APPROACH: ICD-10-PCS | Performed by: PODIATRIST

## 2019-10-01 RX ORDER — ONDANSETRON 2 MG/ML
4 INJECTION INTRAMUSCULAR; INTRAVENOUS AS NEEDED
Status: DISCONTINUED | OUTPATIENT
Start: 2019-10-01 | End: 2019-10-01 | Stop reason: HOSPADM

## 2019-10-01 RX ORDER — MORPHINE SULFATE 4 MG/ML
4 INJECTION, SOLUTION INTRAMUSCULAR; INTRAVENOUS
Status: DISCONTINUED | OUTPATIENT
Start: 2019-10-01 | End: 2019-10-03

## 2019-10-01 RX ORDER — NALOXONE HYDROCHLORIDE 0.4 MG/ML
80 INJECTION, SOLUTION INTRAMUSCULAR; INTRAVENOUS; SUBCUTANEOUS AS NEEDED
Status: DISCONTINUED | OUTPATIENT
Start: 2019-10-01 | End: 2019-10-01 | Stop reason: HOSPADM

## 2019-10-01 RX ORDER — DEXTROSE MONOHYDRATE 25 G/50ML
50 INJECTION, SOLUTION INTRAVENOUS
Status: DISCONTINUED | OUTPATIENT
Start: 2019-10-01 | End: 2019-10-01 | Stop reason: HOSPADM

## 2019-10-01 RX ORDER — SODIUM CHLORIDE 9 MG/ML
INJECTION, SOLUTION INTRAVENOUS CONTINUOUS
Status: DISCONTINUED | OUTPATIENT
Start: 2019-10-01 | End: 2019-10-01 | Stop reason: HOSPADM

## 2019-10-01 RX ORDER — MORPHINE SULFATE 4 MG/ML
2 INJECTION, SOLUTION INTRAMUSCULAR; INTRAVENOUS
Status: DISCONTINUED | OUTPATIENT
Start: 2019-10-01 | End: 2019-10-03

## 2019-10-01 RX ORDER — HYDROCODONE BITARTRATE AND ACETAMINOPHEN 5; 325 MG/1; MG/1
1 TABLET ORAL AS NEEDED
Status: DISCONTINUED | OUTPATIENT
Start: 2019-10-01 | End: 2019-10-01 | Stop reason: HOSPADM

## 2019-10-01 RX ORDER — HYDROCODONE BITARTRATE AND ACETAMINOPHEN 5; 325 MG/1; MG/1
1 TABLET ORAL EVERY 4 HOURS PRN
Status: DISCONTINUED | OUTPATIENT
Start: 2019-10-01 | End: 2019-10-03

## 2019-10-01 RX ORDER — SODIUM CHLORIDE, SODIUM LACTATE, POTASSIUM CHLORIDE, CALCIUM CHLORIDE 600; 310; 30; 20 MG/100ML; MG/100ML; MG/100ML; MG/100ML
INJECTION, SOLUTION INTRAVENOUS CONTINUOUS
Status: DISCONTINUED | OUTPATIENT
Start: 2019-10-01 | End: 2019-10-01 | Stop reason: HOSPADM

## 2019-10-01 RX ORDER — HYDROCODONE BITARTRATE AND ACETAMINOPHEN 5; 325 MG/1; MG/1
2 TABLET ORAL AS NEEDED
Status: DISCONTINUED | OUTPATIENT
Start: 2019-10-01 | End: 2019-10-01 | Stop reason: HOSPADM

## 2019-10-01 RX ORDER — HYDROMORPHONE HYDROCHLORIDE 1 MG/ML
0.4 INJECTION, SOLUTION INTRAMUSCULAR; INTRAVENOUS; SUBCUTANEOUS EVERY 5 MIN PRN
Status: DISCONTINUED | OUTPATIENT
Start: 2019-10-01 | End: 2019-10-01 | Stop reason: HOSPADM

## 2019-10-01 RX ORDER — HYDROCODONE BITARTRATE AND ACETAMINOPHEN 5; 325 MG/1; MG/1
2 TABLET ORAL EVERY 4 HOURS PRN
Status: DISCONTINUED | OUTPATIENT
Start: 2019-10-01 | End: 2019-10-03

## 2019-10-01 RX ORDER — BUPIVACAINE HYDROCHLORIDE 5 MG/ML
INJECTION, SOLUTION EPIDURAL; INTRACAUDAL AS NEEDED
Status: DISCONTINUED | OUTPATIENT
Start: 2019-10-01 | End: 2019-10-01 | Stop reason: HOSPADM

## 2019-10-01 RX ORDER — MORPHINE SULFATE 4 MG/ML
8 INJECTION, SOLUTION INTRAMUSCULAR; INTRAVENOUS
Status: DISCONTINUED | OUTPATIENT
Start: 2019-10-01 | End: 2019-10-03

## 2019-10-01 RX ORDER — POTASSIUM CHLORIDE 20 MEQ/1
40 TABLET, EXTENDED RELEASE ORAL EVERY 4 HOURS
Status: COMPLETED | OUTPATIENT
Start: 2019-10-01 | End: 2019-10-02

## 2019-10-01 NOTE — PROGRESS NOTES
[facility]                INFECTIOUS DISEASE PROGRESS NOTE    Serean Avery Patient Status:  Inpatient    1950 MRN [id number]   [facility] 3NE-A Attending Mónica Torres [facility] Day # 5 PCP Lilliam Thomas MD N/A    Aerobic Culture Result 1+ growth Gram Negative Jose F (A) N/A    Aerobic Smear 1+ WBCs seen N/A    Aerobic Smear 3+ Gram Negative Rods N/A    Aerobic Smear 1+ Gram Positive Rods N/A    Aerobic Smear 3+ Gram positive cocci in pairs and clusters N/A long-term current use of insulin ([facility] [address]     Need for influenza vaccination     Cellulitis and abscess of foot     Gangrenous toe ([facility] [address]     Anemia     Azotemia     Hyperglycemia     Wet gangrene (HCC)     Osteomyelitis of right foot (HCC)     Wound infection

## 2019-10-01 NOTE — BRIEF OP NOTE
Pre-Operative Diagnosis: Gangrene of toe of right foot ([address] [I96]Osteomyelitis of toe of right foot ([address] [M86.9]     Post-Operative Diagnosis: Gangrene of toe of right foot ([address] [I96]Osteomyelitis of toe of right foot ([address] [M86.9]      Procedure Perfor

## 2019-10-01 NOTE — PROGRESS NOTES
[facility] HOSPITALIST  Progress Note     Juan Santos Patient Status:  Inpatient    1950 MRN [id number]   [facility] 3NE-A Attending Efrain Russ, [address] [facility] [address] Day # 5 PCP Sheldon Sandra MD     Chief Complaint: rt third toe gangrene mg Oral Daily   • atorvastatin  40 mg Oral Nightly   • lisinopril  20 mg Oral Daily   • carvedilol  12.5 mg Oral BID with meals   • furosemide  20 mg Oral Daily   • insulin detemir  16 Units Subcutaneous Nightly   • Verapamil HCl ER  240 mg Oral Nightly

## 2019-10-01 NOTE — RESPIRATORY THERAPY NOTE
HARVEY - Equipment Use Daily Summary:  · Set Mode CPAP  · Usage in hours: 5:36  · 90% Pressure (EPAP) level: 15.0  · 90% Insp Pressure (IPAP):   · AHI: 6.3  · Supplemental Oxygen:   · Comments:

## 2019-10-01 NOTE — ANESTHESIA POSTPROCEDURE EVALUATION
[facility]. Patient Status:  Inpatient   Age/Gender 77year old female MRN [id number]   [facility] SURGERY Attending Katt Jung # 5 PCP Beatrice De La Rosa MD       Anesthesia Post-op Note    Pro

## 2019-10-01 NOTE — OPERATIVE REPORT
Date of surgery: 10/1/2019     Preoperative Diagnosis:   1. Right third toe gangrene  2. Right third toe osteomyelitis    Postoperative Diagnosis: Same     Procedure:   1.   Right third toe amputation     Surgeon: Dr. Dennise Izaguirre  Anesthesia: MAC + local

## 2019-10-01 NOTE — PLAN OF CARE
Patient alert and oriented x4, VS stable, RA w/ NC prn and CPAP and night, NSR on tele  Mild to moderate pain R foot  Patient undergone removal of her R third digit this morning, dressing d/c/I  Accucheck qid, insulin for meal coverage  Oral Vanco for rece

## 2019-10-01 NOTE — PLAN OF CARE
A/O x 4   Was on bedrest until 2115. Post op vitals stable. Room air during day, HARVEY CPAP at night  Tele NSR  Zosyn IV  NPO at midnight for amputation of R 3rd toe. Dressing clean, dry, intact. 4x4 guaze and kerlix.    Encouraged use of surgical shoe, bu trends  - Monitor for bleeding, hypotension and signs of decreased cardiac output  - Evaluate effectiveness of vasoactive medications to optimize hemodynamic stability  - Monitor arterial and/or venous puncture sites for bleeding and/or hematoma  - Assess

## 2019-10-01 NOTE — PLAN OF CARE
Pt back from cath lab s/p peripheral angiogram. She is alert and oriented, denies any pain. Bed rest flat to be observed for 4h starting at 1715. L groin soft with gauze clean and dry.

## 2019-10-01 NOTE — ANESTHESIA PREPROCEDURE EVALUATION
PRE-OP EVALUATION    Patient Name: Anna Joe    Pre-op Diagnosis: Gangrene of toe of right foot ([facility] [address] [I96]  Osteomyelitis of toe of right foot ([facility] [address] [M86.9]    Procedure(s):  amputation right third toe    Surgeon(s) and Role:     Tasneem Rangel HCl (ZOFRAN) injection 4 mg 4 mg Intravenous Q6H PRN   Piperacillin Sod-Tazobactam So (ZOSYN) 3.375 g in dextrose 5 % 100 mL ADD-vantage 3.375 g Intravenous Q8H   vancomycin HCl (FIRVANQ) 50 MG/ML oral solution 125 mg 125 mg Oral Daily   glucose (DEX4) ora laparoscopic May 2012 [facility] Dr Stacey Palafox   • COLONOSCOPY  1/1/08   • COLONOSCOPY N/A 3/5/2019    Performed by Carito Berrios MD at [facility] ENDOSCOPY   • COLONOSCOPY N/A 11/26/2013    Performed by Carito Berrios MD at [facility] ENDOSCOPY   • [address]

## 2019-10-02 PROCEDURE — 99233 SBSQ HOSP IP/OBS HIGH 50: CPT | Performed by: HOSPITALIST

## 2019-10-02 RX ORDER — METRONIDAZOLE 500 MG/1
500 TABLET ORAL 2 TIMES DAILY
Qty: 20 TABLET | Refills: 0 | Status: SHIPPED | OUTPATIENT
Start: 2019-10-02 | End: 2019-10-12

## 2019-10-02 RX ORDER — LEVOFLOXACIN 500 MG/1
500 TABLET, FILM COATED ORAL DAILY
Qty: 10 TABLET | Refills: 0 | Status: SHIPPED | OUTPATIENT
Start: 2019-10-02 | End: 2019-10-12

## 2019-10-02 NOTE — PROGRESS NOTES
[facility]                INFECTIOUS DISEASE PROGRESS NOTE    Eugene Law Patient Status:  Inpatient    1950 MRN [id number]   [facility] 3NE-A Attending [facility] Day [facility] PCP Ramila Birch MD Culture Result 1+ growth Escherichia coli (A) N/A    Aerobic Culture Result 1+ growth Staphylococcus aureus (A) N/A    Aerobic Culture Result 1+ growth Enterobacter cloacae complex (A) N/A    Aerobic Culture Result 1+ growth Acinetobacter radioresistens (A Lumbar compression fracture (HCC)     Type 2 diabetes mellitus with diabetic neuropathy, with long-term current use of insulin (HCC)     HARVEY on CPAP     Diabetic retinopathy of both eyes ([address]     Coronary artery disease involving native heart without reyna

## 2019-10-02 NOTE — PROCEDURES
[facility]    PATIENT'S NAME: Geraldine Partida   ATTENDING PHYSICIAN: Mohini Retana M.D. OPERATING PHYSICIAN: James Kam.  MD Reginald   PATIENT ACCOUNT#:   [id number]    LOCATION:  [address] [place] [address]  MEDICAL RECORD #:   [id number]       DATE OF B 78-year-old female who is morbidly obese, who was evaluated with a nonhealing wound in the right lower extremity.   She underwent noninvasive vascular access that revealed diminished ABIs and toe pressures, and as such, we recommended a right lower extremit Advantage was advanced into the distal SFA, and then we exchanged for a 6-Angolan Terumo Destination sheath that was advanced up and over the bifurcation into the distal SFA.   We then utilized a Trail Blazer and Glidewire Advantage to select the AT, and the

## 2019-10-02 NOTE — PROGRESS NOTES
Dr. Chirag Be called regarding discharge, stated ok to discharge tonight and gave dressing change orders and stated ok to resume home meds including plavix and follow up in 2 weeks in wound clinic.

## 2019-10-02 NOTE — PROGRESS NOTES
Ok to discharge.    Local wound care  Follow in 2 weeks at THE [facility] wound clinic   D/w RN    Dr. Saniya Duarte

## 2019-10-02 NOTE — RESPIRATORY THERAPY NOTE
HARVEY - Equipment Use Daily Summary:  · Set Mode CPAP  · Usage in hours: 1:00  · 90% Pressure (EPAP) level: 15.0  · 90% Insp Pressure (IPAP):   · AHI: 17.0  · Supplemental Oxygen:   · Comments:

## 2019-10-02 NOTE — PROGRESS NOTES
[facility] HOSPITALIST  Progress Note     Parishrosales Rome Patient Status:  Inpatient    1950 MRN [id number]   [facility] 3NE-A Attending Akiko Oviedo, [address] [facility] Day # 6 PCP Kamila Palafox MD     Chief Complaint: rt third toe gangrene hours.         Imaging: Imaging data reviewed in Epic.     Medications:   • atorvastatin  40 mg Oral Nightly   • lisinopril  20 mg Oral Daily   • carvedilol  12.5 mg Oral BID with meals   • furosemide  20 mg Oral Daily   • insulin detemir  16 Units Subcutan

## 2019-10-02 NOTE — PLAN OF CARE
Received patient awake sitting at the edge of the bed. Has minimal pain on her foot. Medicated with Norco with good relief. CPAP at night. SR on tele. Replaced Potassium for K of 3.4. WBAT. PT and OT to eval and treat in am. QID accuchecks.      Problem: [place] Tobias intact  Description  INTERVENTIONS  - Assess and document risk factors for pressure ulcer development  - Assess and document skin integrity  - Monitor for areas of redness and/or skin breakdown  - Initiate interventions, skin care algorithm/standards of ca

## 2019-10-02 NOTE — PHYSICAL THERAPY NOTE
PHYSICAL THERAPY TREATMENT NOTE - INPATIENT    Room Number: 6702/0276-C     Session: 2   Number of Visits to Meet Established Goals: 3    Presenting Problem: R 3rd toe wound    Problem List  Principal Problem:    Gangrenous toe ([address]  Active Problems: History:   Procedure Laterality Date   • ANGIOPLASTY (CORONARY)      1 stent per Dr Katie Flower   • [address] [facility]      left lumpectomy   • BREAST SURGERY PROCEDURE UNLISTED  08   •   1985   • CATARACT     • CATARACT EXTRACTION Left None   -   Moving from lying on back to sitting on the side of the bed?: None   How much help from another person does the patient currently need. ..   -   Moving to and from a bed to a chair (including a wheelchair)?: None   -   Need to walk in hospital ro light within reach;RN aware of session/findings; All patient questions and concerns addressed    ASSESSMENT     Pt seen this AM for bed mobility, transfers, gait training and BLE strengthening. Pt up at SBA level or above with use of RW.   Pt donned post-op

## 2019-10-02 NOTE — PROGRESS NOTES
Patient unable to discharge home tonight due to not having a ride home until tomorrow. Surgical dressing removed and new dressing applied per podiatry orders.  Wound site is clean, dry, intact

## 2019-10-03 ENCOUNTER — TELEPHONE (OUTPATIENT)
Dept: FAMILY MEDICINE CLINIC | Facility: CLINIC | Age: 69
End: 2019-10-03

## 2019-10-03 VITALS
HEART RATE: 68 BPM | SYSTOLIC BLOOD PRESSURE: 115 MMHG | RESPIRATION RATE: 18 BRPM | WEIGHT: 180.13 LBS | HEIGHT: 64 IN | DIASTOLIC BLOOD PRESSURE: 52 MMHG | TEMPERATURE: 99 F | OXYGEN SATURATION: 95 % | BODY MASS INDEX: 30.75 KG/M2

## 2019-10-03 PROCEDURE — 99239 HOSP IP/OBS DSCHRG MGMT >30: CPT | Performed by: HOSPITALIST

## 2019-10-03 RX ORDER — ISOPROPYL ALCOHOL 0.75 G/1
SWAB TOPICAL
COMMUNITY
Start: 2019-09-09 | End: 2021-01-01

## 2019-10-03 RX ORDER — ACETAMINOPHEN AND CODEINE PHOSPHATE 300; 30 MG/1; MG/1
1 TABLET ORAL EVERY 4 HOURS PRN
Qty: 20 TABLET | Refills: 0 | Status: SHIPPED | OUTPATIENT
Start: 2019-10-03 | End: 2019-10-16

## 2019-10-03 NOTE — PLAN OF CARE
Patient is A&O x4. VSS- NSR on tele. CPAP and  in place while patient is sleeping. SpO2 remains stable. Patient reports moderate right foot pain r/t procedure. Pain medication available PRN. Patient denies nausea/SOB/dizziness.  Patient reports 2 loose, Maintains optimal cardiac output and hemodynamic stability  Description  INTERVENTIONS:  - Monitor vital signs, rhythm, and trends  - Monitor for bleeding, hypotension and signs of decreased cardiac output  - Evaluate effectiveness of vasoactive medication

## 2019-10-03 NOTE — PLAN OF CARE
NURSING DISCHARGE NOTE    Discharged Home via Wheelchair. Accompanied by Support staff  Belongings Taken by patient/family. Patient did not have another loose stool after seen by Dr. Arcadio Vargas, he was informed.   Discharge papers given and explained to

## 2019-10-03 NOTE — PROGRESS NOTES
[facility]                INFECTIOUS DISEASE PROGRESS NOTE    Amadou Falls Patient Status:  Inpatient    1950 MRN [id number]   [facility] 3NE-A Attending Guille Ramos [facility] Day # 7 PCP Leonor Reynolds MD (A) N/A    Aerobic Culture Result 1+ growth Escherichia coli (A) N/A    Aerobic Culture Result 1+ growth Staphylococcus aureus (A) N/A    Aerobic Culture Result 1+ growth Enterobacter cloacae complex (A) N/A    Aerobic Culture Result 1+ growth Acinetobacte D deficiency     Lumbar compression fracture (HCC)     Type 2 diabetes mellitus with diabetic neuropathy, with long-term current use of insulin (HCC)     HARVEY on CPAP     Diabetic retinopathy of both eyes ([facility])     Coronary artery disease involving native h

## 2019-10-03 NOTE — TELEPHONE ENCOUNTER
Pt had toe amputated yesterday and she is now home and needs pain medication. I asked why the surgeon did not give her the medication and she stated that she did not know. She would like to have her  come in today and  medication.

## 2019-10-03 NOTE — PROGRESS NOTES
IM quick note:    D/c held as pt did not have a ride yesterday. Ok to d/c home. Med recc completed yesterday.     Rickey Dakin  10/3/2019

## 2019-10-03 NOTE — TELEPHONE ENCOUNTER
Please notify: Erx'd to pharmacy    Requested Prescriptions     Signed Prescriptions Disp Refills   • Acetaminophen-Codeine #3 300-30 MG Oral Tab 20 tablet 0     Sig: Take 1 tablet by mouth every 4 (four) hours as needed for Pain.      Authorizing Provider:

## 2019-10-03 NOTE — TELEPHONE ENCOUNTER
patient had toe amputated on 10/1/2019 sent home today but has no pain medication for the post op pain.  [place] [facility] is clear for dispenses

## 2019-10-03 NOTE — RESPIRATORY THERAPY NOTE
HARVEY - Equipment Use Daily Summary:  · Set Mode   · Usage in hours:   · 90% Pressure (EPAP) level:   · 90% Insp Pressure (IPAP):   · AHI:   · Supplemental Oxygen:  · Comments: unable to retreive data

## 2019-10-04 ENCOUNTER — PATIENT OUTREACH (OUTPATIENT)
Dept: CASE MANAGEMENT | Age: 69
End: 2019-10-04

## 2019-10-04 ENCOUNTER — TELEPHONE (OUTPATIENT)
Dept: FAMILY MEDICINE CLINIC | Facility: CLINIC | Age: 69
End: 2019-10-04

## 2019-10-04 DIAGNOSIS — Z02.9 ENCOUNTERS FOR ADMINISTRATIVE PURPOSE: ICD-10-CM

## 2019-10-04 NOTE — PROGRESS NOTES
ZACK s/w patient for hospital follow up. She was very difficult to redirect and kept going on rants regarding the state of current healthcare.  She states that she is not happy with healthcare now, that it's a different generation with cellphones and texting

## 2019-10-04 NOTE — TELEPHONE ENCOUNTER
Claryce Carrel from [place] [address] is calling to let us know that Monday is the start of care date and only 3 visits are authorized. The dressing change needs to be done tomorrow.   So they are going to wait until tomorrow for the nurse will see her for sheldon

## 2019-10-04 NOTE — PAYOR COMM NOTE
--------------  DISCHARGE REVIEW    Diamond Nelson MA [facility] [place]  Subscriber #:  [id number]  Authorization Number: [id number]    Admit date: 9/26/19  Admit time:  2315  Discharge Date: 10/3/2019 11:45 AM     Admitting Physician: DO Ariadna Burkettia

## 2019-10-04 NOTE — TELEPHONE ENCOUNTER
This was an information call. Karen Camarillo from [facility] states Milissa Goldmann has only 4 visit available She didn't want to use a visit to fill out paper work today, so visits will start tomorrow to change toe amputation dressing.

## 2019-10-05 NOTE — DISCHARGE SUMMARY
[facility] HOSPITAL[facility]  DISCHARGE SUMMARY     Brigida Vizcarra Patient Status:  Inpatient    1950 MRN [id number]   [facility] 3NE-A Attending No att. providers found   [facility] Day # 7 PCP Yevgeniy Downey MD     Date of Admission: 2019 w/Device Kit      USE AS DIRECTED   Quantity:  1 kit  Refills:  3     ACCU-CHEK ANN MARIE Soln      USE TO CHECK CALIBRATION OF MONITOR   Quantity:  3 each  Refills:  3     ACCU-CHEK SOFTCLIX LANCETS Misc      1 lancet by Finger stick route 4 (four) times jericho tablet  Refills:  0     metFORMIN HCl 1000 MG Tabs  Commonly known as:  GLUCOPHAGE      TAKE 1 TABLET TWICE DAILY WITH MEALS   Quantity:  180 tablet  Refills:  3     NOVOFINE 30G X 8 MM Misc  Generic drug:  Insulin Pen Needle       Refills:  0     Insulin General: No acute distress. Respiratory: Clear to auscultation bilaterally. No wheezes. No rhonchi. Cardiovascular: S1, S2. Regular rate and rhythm. No murmurs, rubs or gallops. Abdomen: Soft, nontender, nondistended. Positive bowel sounds.  No rebo

## 2019-10-07 DIAGNOSIS — K52.9 CHRONIC DIARRHEA: ICD-10-CM

## 2019-10-07 RX ORDER — LOPERAMIDE HYDROCHLORIDE 2 MG/1
1-2 TABLET ORAL 4 TIMES DAILY PRN
Qty: 30 TABLET | Refills: 4 | Status: SHIPPED | OUTPATIENT
Start: 2019-10-07

## 2019-10-08 ENCOUNTER — TELEPHONE (OUTPATIENT)
Dept: FAMILY MEDICINE CLINIC | Facility: CLINIC | Age: 69
End: 2019-10-08

## 2019-10-08 NOTE — TELEPHONE ENCOUNTER
Sherin Castrejon did an evaluation was done today and he is recommending once a week for 4 weeks just waiting on insurance to see if they will cover.

## 2019-10-14 ENCOUNTER — OFFICE VISIT (OUTPATIENT)
Dept: WOUND CARE | Facility: HOSPITAL | Age: 69
End: 2019-10-14
Attending: PODIATRIST
Payer: MEDICARE

## 2019-10-14 DIAGNOSIS — I73.9 PERIPHERAL VASCULAR DISEASE, UNSPECIFIED (HCC): Primary | ICD-10-CM

## 2019-10-14 DIAGNOSIS — L97.929 CHRONIC VENOUS HYPERTENSION (IDIOPATHIC) WITH ULCER OF LEFT LOWER EXTREMITY (HCC): ICD-10-CM

## 2019-10-14 DIAGNOSIS — I87.312 CHRONIC VENOUS HYPERTENSION (IDIOPATHIC) WITH ULCER OF LEFT LOWER EXTREMITY (HCC): ICD-10-CM

## 2019-10-14 PROCEDURE — 82962 GLUCOSE BLOOD TEST: CPT

## 2019-10-14 PROCEDURE — 99214 OFFICE O/P EST MOD 30 MIN: CPT

## 2019-10-15 NOTE — PROGRESS NOTES
Progress Note Details  Patient Name: Kaelyn Andres   Patient Number: [id number]  Patient YOB: 1950  Date: 10/14/2019  Physician / Shawn Garcia: Ifeanyi Rosado 19: Emilie Contreras    Chief Complaint  This information was obtained from reports a wound pain of level 6/10. The wound margin is scabbed. Wound bed has % eschar. The periwound skin exhibited: Edema, Dry/Scaly. The temperature of the periwound skin is Warm. Periwound skin does not exhibit signs or symptoms of infection.  L E11.52 - Type 2 diabetes mellitus with diabetic peripheral angiopathy with gangrene        Plan    Wound Orders:  Wound #17 Right Third Toe    Topicals:  No anesthetic needed. Insensate. Wound Cleansing & Dressings  May shower with protection.   Betadine

## 2019-10-16 ENCOUNTER — TELEPHONE (OUTPATIENT)
Dept: FAMILY MEDICINE CLINIC | Facility: CLINIC | Age: 69
End: 2019-10-16

## 2019-10-16 ENCOUNTER — OFFICE VISIT (OUTPATIENT)
Dept: FAMILY MEDICINE CLINIC | Facility: CLINIC | Age: 69
End: 2019-10-16
Payer: MEDICARE

## 2019-10-16 VITALS
HEART RATE: 80 BPM | RESPIRATION RATE: 14 BRPM | WEIGHT: 178.81 LBS | HEIGHT: 64.5 IN | BODY MASS INDEX: 30.16 KG/M2 | SYSTOLIC BLOOD PRESSURE: 120 MMHG | DIASTOLIC BLOOD PRESSURE: 76 MMHG | TEMPERATURE: 97 F

## 2019-10-16 DIAGNOSIS — L02.619 CELLULITIS AND ABSCESS OF FOOT: Primary | ICD-10-CM

## 2019-10-16 DIAGNOSIS — I96 GANGRENOUS TOE (HCC): ICD-10-CM

## 2019-10-16 DIAGNOSIS — L03.119 CELLULITIS AND ABSCESS OF FOOT: Primary | ICD-10-CM

## 2019-10-16 DIAGNOSIS — I96 WET GANGRENE (HCC): ICD-10-CM

## 2019-10-16 PROCEDURE — 1111F DSCHRG MED/CURRENT MED MERGE: CPT | Performed by: FAMILY MEDICINE

## 2019-10-16 PROCEDURE — 99495 TRANSJ CARE MGMT MOD F2F 14D: CPT | Performed by: FAMILY MEDICINE

## 2019-10-16 RX ORDER — ACETAMINOPHEN AND CODEINE PHOSPHATE 300; 30 MG/1; MG/1
1 TABLET ORAL EVERY 4 HOURS PRN
Qty: 20 TABLET | Refills: 2 | Status: SHIPPED | OUTPATIENT
Start: 2019-10-30 | End: 2021-01-01

## 2019-10-16 NOTE — PROGRESS NOTES
HPI:    Alberto Pendleton is a 77year old female here today for hospital follow up.    She was discharged from Inpatient hospital, [facility] to Home   Admission Date: 9/26/19   Discharge Date: 10/3/19  Hospital Discharge Diagnoses (since 9/16/201 Insulin.  ergocalciferol 71246 units Oral Cap, Take 1 capsule (50,000 Units total) by mouth once a week. furosemide 20 MG Oral Tab, Take 1 tablet (20 mg total) by mouth daily.   glimepiride 4 MG Oral Tab, Take 1 tablet (4 mg total) by mouth every morning b Cancer ([facility] (2008), Cataract, CORONARY ARTERY DISEASE, Coronary heart disease (3/31/2014), Dental abscess (4/14/2015), Diabetes ([facility] Diabetes mellitus ([facility] Diabetic retinopathy of both eyes ([facility] (2/12/2014), Diverticulitis, Easy bruising, Exposure to of Systems   Constitutional: Negative. Negative for activity change, appetite change, chills and fever. HENT: Negative. Eyes: Negative. Respiratory: Negative. Negative for shortness of breath. Cardiovascular: Negative.   Negative for chest pain abscess of foot doing better with wound care. Continue to follow closely with gangrene is stable, continue antibiotics, recent amputation of the toe. Watch closely and reassess in about 1 to 2 months.     No orders of the defined types were placed in this

## 2019-10-16 NOTE — TELEPHONE ENCOUNTER
I believe she is getting home health right now so she can get PT through them but while she is homebound she is not good to be able to get specific PT facility referral.

## 2019-10-16 NOTE — TELEPHONE ENCOUNTER
Patient was just seen today forgot to ask you about a PT referral.  Too soon for PT? Wait till healed?

## 2019-10-21 ENCOUNTER — OFFICE VISIT (OUTPATIENT)
Dept: WOUND CARE | Facility: HOSPITAL | Age: 69
End: 2019-10-21
Attending: PODIATRIST
Payer: MEDICARE

## 2019-10-21 DIAGNOSIS — L97.929 CHRONIC VENOUS HYPERTENSION (IDIOPATHIC) WITH ULCER OF LEFT LOWER EXTREMITY (HCC): ICD-10-CM

## 2019-10-21 DIAGNOSIS — I87.312 CHRONIC VENOUS HYPERTENSION (IDIOPATHIC) WITH ULCER OF LEFT LOWER EXTREMITY (HCC): ICD-10-CM

## 2019-10-21 DIAGNOSIS — I73.9 PERIPHERAL VASCULAR DISEASE, UNSPECIFIED (HCC): Primary | ICD-10-CM

## 2019-10-21 PROCEDURE — 99213 OFFICE O/P EST LOW 20 MIN: CPT

## 2019-10-21 PROCEDURE — 82962 GLUCOSE BLOOD TEST: CPT

## 2019-10-25 ENCOUNTER — PRIOR ORIGINAL RECORDS (OUTPATIENT)
Dept: OTHER | Age: 69
End: 2019-10-25

## 2019-10-25 NOTE — PROGRESS NOTES
Progress Note Details  Patient Name: Dang Ames   Patient Number: [id number]  Patient YOB: 1950  Date: 10/21/2019  Physician / Shirley : Jeanette Lee: Robin Brown    Chief Complaint  This information was obtained from Breath, Wheezing  Cardiovascular (Central/Peripheral): Chest Pain, Dyspnea on Exertion, Intermittent Claudication, Lower extremity (leg) resting pain  Gastrointestinal (GI): Bowel Incontinence, Change in Bowel Habits, Nausea / Vomiting / Diarrhea (N/V/D), Device Used: Circ-Aid  Calf Measurement 34 cm from heel with right measurement of 37.8 cm  Ankle Measurement 11 cm from heel with right measurement of 29.7 cm  Vascular Assessment:  Left Extremity colors, hair growth, and conditions:  Extremity Color: Red

## 2019-10-28 ENCOUNTER — TELEPHONE (OUTPATIENT)
Dept: FAMILY MEDICINE CLINIC | Facility: CLINIC | Age: 69
End: 2019-10-28

## 2019-10-28 DIAGNOSIS — L03.119 CELLULITIS OF FOOT: ICD-10-CM

## 2019-10-28 DIAGNOSIS — E11.40 TYPE 2 DIABETES MELLITUS WITH DIABETIC NEUROPATHY, UNSPECIFIED WHETHER LONG TERM INSULIN USE (HCC): Primary | ICD-10-CM

## 2019-10-28 NOTE — TELEPHONE ENCOUNTER
Pt requesting referral for [facility] for Diabetic shoes and insoles.  Ph # [phone] and fax # [phone] (was informed that a referral is needed)

## 2019-10-29 NOTE — TELEPHONE ENCOUNTER
Approved referral faxed to [facility] and [address]. Patient notified.

## 2019-10-29 NOTE — TELEPHONE ENCOUNTER
Referral request [facility]    [facility]     x 2   x 6    Auth number: [id number] per [employer] valid 10/29 to 11/27/2019    Patient seen by Dr. Noel Pires M.D.  ASSESSMENT/ PLAN:   Diagnoses and all orders for this visit:     Bailey

## 2019-11-17 ENCOUNTER — TELEPHONE (OUTPATIENT)
Dept: FAMILY MEDICINE CLINIC | Facility: CLINIC | Age: 69
End: 2019-11-17

## 2019-11-17 DIAGNOSIS — C50.312 MALIGNANT NEOPLASM OF LOWER-INNER QUADRANT OF LEFT FEMALE BREAST, UNSPECIFIED ESTROGEN RECEPTOR STATUS (HCC): Primary | Chronic | ICD-10-CM

## 2019-11-20 ENCOUNTER — TELEPHONE (OUTPATIENT)
Dept: FAMILY MEDICINE CLINIC | Facility: CLINIC | Age: 69
End: 2019-11-20

## 2019-11-20 DIAGNOSIS — C50.312 MALIGNANT NEOPLASM OF LOWER-INNER QUADRANT OF LEFT FEMALE BREAST, UNSPECIFIED ESTROGEN RECEPTOR STATUS (HCC): Primary | ICD-10-CM

## 2019-11-20 NOTE — TELEPHONE ENCOUNTER
Patient is calling wanting to know if a referral can be issued to Dr. Chad Ascencio. for an appointment this Friday.

## 2019-11-21 ENCOUNTER — TELEPHONE (OUTPATIENT)
Dept: FAMILY MEDICINE CLINIC | Facility: CLINIC | Age: 69
End: 2019-11-21

## 2019-11-21 DIAGNOSIS — L03.116 CELLULITIS OF LEFT LOWER LIMB: ICD-10-CM

## 2019-11-21 DIAGNOSIS — E13.42 DIABETIC POLYNEUROPATHY ASSOCIATED WITH OTHER SPECIFIED DIABETES MELLITUS (HCC): Primary | ICD-10-CM

## 2019-11-21 DIAGNOSIS — L03.115 CELLULITIS OF RIGHT LOWER LIMB: ICD-10-CM

## 2019-11-21 NOTE — TELEPHONE ENCOUNTER
Referral request Dr. Cam Alvares M.D.,Oncology    Office notes from Dr. Noemi Chapman M.D. 3/14/19        Oncologic     Malignant neoplasm of female breast [facility]) (Chronic)     Overview       Dr Torito Hatfield, left side

## 2019-11-21 NOTE — TELEPHONE ENCOUNTER
Left message for patient on mobile phone number that we have placed referral for her to Dr. Koki Presley M.D.

## 2019-11-21 NOTE — TELEPHONE ENCOUNTER
Referral request RANDEE Duenas and [facility] [employer]  Fax number: [phone]     Lt   Rt  [phone] Lt  [phone] Rt      Office notes from Dr. Yevgeniy Downey M.D. 10/16/2019  Cellulitis and abscess of foot doing better with wound care.   Continue to follow closely

## 2019-11-22 ENCOUNTER — TELEPHONE (OUTPATIENT)
Dept: FAMILY MEDICINE CLINIC | Facility: CLINIC | Age: 69
End: 2019-11-22

## 2019-11-22 DIAGNOSIS — C50.312 MALIGNANT NEOPLASM OF LOWER-INNER QUADRANT OF LEFT FEMALE BREAST, UNSPECIFIED ESTROGEN RECEPTOR STATUS (HCC): Primary | ICD-10-CM

## 2019-11-22 NOTE — TELEPHONE ENCOUNTER
Birgit at Dr Dale Adams office called requesting a new referral for patient, states Dr Schmid Nine retired and referral on file unable to use, asked if can send a new referral to [facility]#[id number]

## 2019-11-22 NOTE — TELEPHONE ENCOUNTER
Referral request Dr. Karol Garg M.D.   Referral request Gian Murray M.D.,Oncology   Office notes from Dr. Jimbo Merrill M.D. 3/14/19          Oncologic     Malignant neoplasm of female breast [facility]) (Chronic)     Overview       Dr Miley Beauchamp, left side

## 2019-12-04 ENCOUNTER — HOSPITAL ENCOUNTER (OUTPATIENT)
Dept: ULTRASOUND IMAGING | Facility: HOSPITAL | Age: 69
Discharge: HOME | End: 2019-12-04
Attending: INTERNAL MEDICINE
Payer: MEDICARE

## 2019-12-04 DIAGNOSIS — E78.5 HYPERLIPIDEMIA LDL GOAL <100: Chronic | ICD-10-CM

## 2019-12-04 DIAGNOSIS — Z23 NEED FOR INFLUENZA VACCINATION: ICD-10-CM

## 2019-12-04 DIAGNOSIS — E11.319 DIABETIC RETINOPATHY OF BOTH EYES ASSOCIATED WITH TYPE 2 DIABETES MELLITUS, MACULAR EDEMA PRESENCE UNSPECIFIED, UNSPECIFIED RETINOPATHY SEVERITY (HCC): Chronic | ICD-10-CM

## 2019-12-04 DIAGNOSIS — Z79.4 TYPE 2 DIABETES MELLITUS WITH DIABETIC NEUROPATHY, WITH LONG-TERM CURRENT USE OF INSULIN (HCC): ICD-10-CM

## 2019-12-04 DIAGNOSIS — I25.10 CAD, MULTIPLE VESSEL: Chronic | ICD-10-CM

## 2019-12-04 DIAGNOSIS — I10 ESSENTIAL HYPERTENSION: ICD-10-CM

## 2019-12-04 DIAGNOSIS — E55.9 VITAMIN D DEFICIENCY: ICD-10-CM

## 2019-12-04 DIAGNOSIS — E11.40 TYPE 2 DIABETES MELLITUS WITH DIABETIC NEUROPATHY, WITH LONG-TERM CURRENT USE OF INSULIN (HCC): ICD-10-CM

## 2019-12-04 PROCEDURE — 76536 US EXAM OF HEAD AND NECK: CPT | Performed by: INTERNAL MEDICINE

## 2019-12-18 ENCOUNTER — OFFICE VISIT (OUTPATIENT)
Dept: FAMILY MEDICINE CLINIC | Facility: CLINIC | Age: 69
End: 2019-12-18
Payer: MEDICARE

## 2019-12-18 VITALS
DIASTOLIC BLOOD PRESSURE: 78 MMHG | WEIGHT: 186.63 LBS | HEIGHT: 64 IN | TEMPERATURE: 98 F | SYSTOLIC BLOOD PRESSURE: 122 MMHG | BODY MASS INDEX: 31.86 KG/M2 | HEART RATE: 72 BPM | RESPIRATION RATE: 12 BRPM

## 2019-12-18 DIAGNOSIS — E04.2 MULTINODULAR GOITER (NONTOXIC): ICD-10-CM

## 2019-12-18 DIAGNOSIS — R06.02 SOBOE (SHORTNESS OF BREATH ON EXERTION): ICD-10-CM

## 2019-12-18 DIAGNOSIS — I25.10 CAD, MULTIPLE VESSEL: Chronic | ICD-10-CM

## 2019-12-18 DIAGNOSIS — E11.40 TYPE 2 DIABETES MELLITUS WITH DIABETIC NEUROPATHY, WITH LONG-TERM CURRENT USE OF INSULIN (HCC): Primary | ICD-10-CM

## 2019-12-18 DIAGNOSIS — Z79.4 TYPE 2 DIABETES MELLITUS WITH DIABETIC NEUROPATHY, WITH LONG-TERM CURRENT USE OF INSULIN (HCC): Primary | ICD-10-CM

## 2019-12-18 DIAGNOSIS — D64.9 ANEMIA, UNSPECIFIED TYPE: ICD-10-CM

## 2019-12-18 PROCEDURE — 83036 HEMOGLOBIN GLYCOSYLATED A1C: CPT | Performed by: FAMILY MEDICINE

## 2019-12-18 PROCEDURE — 99214 OFFICE O/P EST MOD 30 MIN: CPT | Performed by: FAMILY MEDICINE

## 2019-12-18 NOTE — PROGRESS NOTES
Patient presents with:  Diabetes: f/u   Leg Pain: right leg pain       Subjective   HPI:   This is a 77year old female coming in for short follow up, Diabetes Mellitus better controlled, and more fatigue. Today fell asleep 5x.  Less stamina, less energy an distress. She has no wheezes. Abdominal: Soft. Bowel sounds are normal. There is no tenderness. Musculoskeletal:        Right foot: There is no deformity. Left foot: There is no deformity.    Feet:     Right Foot:   Monofilament Exam: 6 sites edith repeat it to see if she is losing any functionality. If she is, will refer back to cardiology for better management. If she is not. Will work on daytime activities to increase level of attention and options.   At this point I do not think she is a good c

## 2019-12-31 ENCOUNTER — PRIOR ORIGINAL RECORDS (OUTPATIENT)
Dept: OTHER | Age: 69
End: 2019-12-31

## 2020-01-01 ENCOUNTER — OFFICE VISIT (OUTPATIENT)
Dept: NEUROLOGY | Facility: CLINIC | Age: 70
End: 2020-01-01
Payer: MEDICARE

## 2020-01-01 ENCOUNTER — TELEPHONE (OUTPATIENT)
Dept: FAMILY MEDICINE CLINIC | Facility: CLINIC | Age: 70
End: 2020-01-01

## 2020-01-01 ENCOUNTER — TELEPHONE (OUTPATIENT)
Dept: PHYSICAL THERAPY | Age: 70
End: 2020-01-01

## 2020-01-01 ENCOUNTER — OFFICE VISIT (OUTPATIENT)
Dept: OCCUPATIONAL MEDICINE | Facility: HOSPITAL | Age: 70
End: 2020-01-01
Attending: Other
Payer: MEDICARE

## 2020-01-01 ENCOUNTER — OFFICE VISIT (OUTPATIENT)
Dept: PHYSICAL THERAPY | Facility: HOSPITAL | Age: 70
End: 2020-01-01
Attending: FAMILY MEDICINE
Payer: MEDICARE

## 2020-01-01 ENCOUNTER — APPOINTMENT (OUTPATIENT)
Dept: HEMATOLOGY/ONCOLOGY | Facility: HOSPITAL | Age: 70
End: 2020-01-01
Attending: SPECIALIST
Payer: MEDICARE

## 2020-01-01 ENCOUNTER — GENETICS ENCOUNTER (OUTPATIENT)
Dept: GENETICS | Facility: HOSPITAL | Age: 70
End: 2020-01-01
Attending: SPECIALIST
Payer: MEDICARE

## 2020-01-01 ENCOUNTER — GENETICS ENCOUNTER (OUTPATIENT)
Dept: HEMATOLOGY/ONCOLOGY | Facility: HOSPITAL | Age: 70
End: 2020-01-01

## 2020-01-01 ENCOUNTER — TELEPHONE (OUTPATIENT)
Dept: NEUROLOGY | Facility: CLINIC | Age: 70
End: 2020-01-01

## 2020-01-01 VITALS
SYSTOLIC BLOOD PRESSURE: 120 MMHG | RESPIRATION RATE: 16 BRPM | DIASTOLIC BLOOD PRESSURE: 72 MMHG | HEART RATE: 76 BPM | WEIGHT: 163 LBS | BODY MASS INDEX: 27 KG/M2

## 2020-01-01 DIAGNOSIS — I63.9 BRAINSTEM INFARCTION (HCC): Primary | ICD-10-CM

## 2020-01-01 DIAGNOSIS — R25.2 LEG CRAMPING: ICD-10-CM

## 2020-01-01 DIAGNOSIS — I10 HYPERTENSION GOAL BP (BLOOD PRESSURE) < 130/80: Chronic | ICD-10-CM

## 2020-01-01 DIAGNOSIS — R53.1 WEAKNESS: ICD-10-CM

## 2020-01-01 DIAGNOSIS — R53.83 FATIGUE, UNSPECIFIED TYPE: ICD-10-CM

## 2020-01-01 DIAGNOSIS — Z91.81 AT RISK FOR FALLING: ICD-10-CM

## 2020-01-01 PROCEDURE — 99213 OFFICE O/P EST LOW 20 MIN: CPT | Performed by: OTHER

## 2020-01-01 PROCEDURE — 97140 MANUAL THERAPY 1/> REGIONS: CPT

## 2020-01-01 PROCEDURE — 3074F SYST BP LT 130 MM HG: CPT | Performed by: OTHER

## 2020-01-01 PROCEDURE — 97110 THERAPEUTIC EXERCISES: CPT

## 2020-01-01 PROCEDURE — 97116 GAIT TRAINING THERAPY: CPT

## 2020-01-01 PROCEDURE — 97163 PT EVAL HIGH COMPLEX 45 MIN: CPT

## 2020-01-01 PROCEDURE — 97112 NEUROMUSCULAR REEDUCATION: CPT

## 2020-01-01 PROCEDURE — 96040 HC GENETIC COUNSELING EA 30 MIN: CPT | Performed by: GENETIC COUNSELOR, MS

## 2020-01-01 PROCEDURE — 80053 COMPREHEN METABOLIC PANEL: CPT

## 2020-01-01 PROCEDURE — 36415 COLL VENOUS BLD VENIPUNCTURE: CPT

## 2020-01-01 PROCEDURE — 3078F DIAST BP <80 MM HG: CPT | Performed by: OTHER

## 2020-01-01 RX ORDER — BENAZEPRIL HYDROCHLORIDE 20 MG/1
20 TABLET ORAL DAILY
Qty: 90 TABLET | Refills: 1 | Status: SHIPPED | OUTPATIENT
Start: 2020-01-01

## 2020-01-01 RX ORDER — GLIMEPIRIDE 4 MG/1
TABLET ORAL
Qty: 90 TABLET | Refills: 1 | Status: SHIPPED | OUTPATIENT
Start: 2020-01-01 | End: 2021-01-01

## 2020-01-01 RX ORDER — CLOPIDOGREL BISULFATE 75 MG/1
75 TABLET ORAL DAILY
Qty: 90 TABLET | Refills: 2 | Status: SHIPPED | OUTPATIENT
Start: 2020-01-01 | End: 2021-01-01

## 2020-01-02 ENCOUNTER — TELEPHONE (OUTPATIENT)
Dept: FAMILY MEDICINE CLINIC | Facility: CLINIC | Age: 70
End: 2020-01-02

## 2020-01-20 ENCOUNTER — HOSPITAL ENCOUNTER (OUTPATIENT)
Dept: CV DIAGNOSTICS | Facility: HOSPITAL | Age: 70
Discharge: HOME | End: 2020-01-20
Attending: FAMILY MEDICINE
Payer: MEDICARE

## 2020-01-20 DIAGNOSIS — R06.02 SOBOE (SHORTNESS OF BREATH ON EXERTION): ICD-10-CM

## 2020-01-20 PROCEDURE — 93306 TTE W/DOPPLER COMPLETE: CPT | Performed by: FAMILY MEDICINE

## 2020-01-21 ENCOUNTER — TELEPHONE (OUTPATIENT)
Dept: FAMILY MEDICINE CLINIC | Facility: CLINIC | Age: 70
End: 2020-01-21

## 2020-01-21 DIAGNOSIS — R26.81 GAIT INSTABILITY: Primary | ICD-10-CM

## 2020-01-21 DIAGNOSIS — M62.81 MUSCLE WEAKNESS (GENERALIZED): ICD-10-CM

## 2020-01-21 NOTE — TELEPHONE ENCOUNTER
Pt is requesting a referral for physical therapy. 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 She states that she becomes SOB after walking a short distance. Referral pended. Routed to Dr Maryan Haywood.

## 2020-01-21 NOTE — TELEPHONE ENCOUNTER
Ok. Diagnoses and all orders for this visit:    Gait instability  -     OP REFERRAL TO [facility]    Muscle weakness (generalized)  -     OP REFERRAL TO [facility]

## 2020-01-27 ENCOUNTER — OFFICE VISIT (OUTPATIENT)
Dept: OCCUPATIONAL MEDICINE | Facility: HOSPITAL | Age: 70
End: 2020-01-27
Attending: SURGERY
Payer: MEDICARE

## 2020-01-27 DIAGNOSIS — E11.40 TYPE 2 DIABETES, CONTROLLED, WITH NEUROPATHY (HCC): Chronic | ICD-10-CM

## 2020-01-27 DIAGNOSIS — IMO0001 INSULIN DEPENDENT DIABETES MELLITUS WITH COMPLICATIONS: ICD-10-CM

## 2020-01-27 DIAGNOSIS — I89.0 LYMPHEDEMA: ICD-10-CM

## 2020-01-27 PROCEDURE — 97535 SELF CARE MNGMENT TRAINING: CPT

## 2020-01-27 PROCEDURE — 97167 OT EVAL HIGH COMPLEX 60 MIN: CPT

## 2020-01-27 RX ORDER — BLOOD GLUCOSE CONTROL HIGH,LOW
EACH MISCELLANEOUS
Qty: 1 EACH | Refills: 0 | Status: SHIPPED | OUTPATIENT
Start: 2020-01-27 | End: 2021-01-01

## 2020-01-27 RX ORDER — BLOOD SUGAR DIAGNOSTIC
STRIP MISCELLANEOUS
Qty: 400 STRIP | Refills: 0 | Status: SHIPPED | OUTPATIENT
Start: 2020-01-27 | End: 2021-01-01

## 2020-01-27 NOTE — PROGRESS NOTES
OCCUPATIONAL THERAPY LE LYMPHEDEMA EVALUATION:   Referring Physician: Dr. 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  For garment liners, wearing Tensogrip sleeve over Left lower leg, the proximal end of which was rolled over causing a \"tourniquet eff Poor tissue hydration in lower leg. Positive Stemmer's sign. *Right heel with \"spongy\" quality. Increased pain with palpation of lateral surface.      (For detailed information on circumferential measurements please refer to table at end of document.) infection/wound risk. 24 sessions  6. Pt will be independent in use of compression garments, self-manual lymph drainage, decongestive exercises and lymphedema precautions for life-long self-management of lymphedema.  24 sessions     Frequency / Duration: Pa

## 2020-01-29 ENCOUNTER — OFFICE VISIT (OUTPATIENT)
Dept: OCCUPATIONAL MEDICINE | Facility: HOSPITAL | Age: 70
End: 2020-01-29
Attending: SURGERY
Payer: MEDICARE

## 2020-01-29 PROCEDURE — 97140 MANUAL THERAPY 1/> REGIONS: CPT

## 2020-01-31 ENCOUNTER — OFFICE VISIT (OUTPATIENT)
Dept: OCCUPATIONAL MEDICINE | Facility: HOSPITAL | Age: 70
End: 2020-01-31
Attending: SURGERY
Payer: MEDICARE

## 2020-01-31 PROCEDURE — 97140 MANUAL THERAPY 1/> REGIONS: CPT

## 2020-01-31 NOTE — PROGRESS NOTES
Dx: Lymphedema (I89.0)           Authorized # of Visits:  3/24        Next MD visit/plan renewal:  2/27/20  Fall Risk: HIGH          Precautions:  Left UE limb precautions; HIGH FALL RISK; cardiac; lymphedema; wound/infection risk         Subjective:   *Pt MEASUREMENTS:  None taken     TREATMENT:  *Re-discussed pt's decision to move forward with compression necessary for reduction phase. Pt agreeable to use of [employer] Classic garments.  With observable reduction observed in Left lower leg with use of cu sessions  2. Pt will be independent in decongestive exercises. 6 sessions   3. Pt will obtain new Velcro-closure garment lower leg and ankle/foot garments. 10     4.  Reduce bilateral LE lymphedema volume by 20-50cm to allow pt to fit into shoes and to redu

## 2020-02-03 ENCOUNTER — OFFICE VISIT (OUTPATIENT)
Dept: OCCUPATIONAL MEDICINE | Facility: HOSPITAL | Age: 70
End: 2020-02-03
Attending: SURGERY
Payer: MEDICARE

## 2020-02-03 PROCEDURE — 97140 MANUAL THERAPY 1/> REGIONS: CPT

## 2020-02-03 NOTE — PROGRESS NOTES
Dx: Lymphedema (I89.0)           Authorized # of Visits:  4/24        Next MD visit/plan renewal:  2/27/20  Fall Risk: HIGH          Precautions:  Left UE limb precautions; HIGH FALL RISK; cardiac; lymphedema; wound/infection risk         Subjective:   *Pt Positive Stemmer's sign. *Right heel with \"spongy\" quality. Increased pain with palpation of lateral surface.     MEASUREMENTS:  None taken     TREATMENT:  *Bilateral lower quadrant manual lymph drainage with gentle soft tissue mobilization of dense area Continue current plan.       Charges: 4 Manual Therapy    Total Timed Treatment: 55 min  Total Treatment Time: 60 min

## 2020-02-05 ENCOUNTER — OFFICE VISIT (OUTPATIENT)
Dept: OCCUPATIONAL MEDICINE | Facility: HOSPITAL | Age: 70
End: 2020-02-05
Attending: SURGERY
Payer: MEDICARE

## 2020-02-05 ENCOUNTER — OFFICE VISIT (OUTPATIENT)
Dept: PHYSICAL THERAPY | Facility: HOSPITAL | Age: 70
End: 2020-02-05
Attending: FAMILY MEDICINE
Payer: MEDICARE

## 2020-02-05 DIAGNOSIS — R26.81 GAIT INSTABILITY: ICD-10-CM

## 2020-02-05 DIAGNOSIS — M62.81 MUSCLE WEAKNESS (GENERALIZED): ICD-10-CM

## 2020-02-05 PROCEDURE — 97163 PT EVAL HIGH COMPLEX 45 MIN: CPT

## 2020-02-05 PROCEDURE — 97140 MANUAL THERAPY 1/> REGIONS: CPT

## 2020-02-05 PROCEDURE — 97110 THERAPEUTIC EXERCISES: CPT

## 2020-02-05 NOTE — PROGRESS NOTES
FALL SCREEN EVALUATION   Referring Physician: Dr. Luis Clayton  Diagnosis: Gait instability, muscle weakness-generalized     Date of Service: 2/5/2020     PATIENT SUMMARY   Anna Joe is a 77year old female who presents to therapy today with complaint disease 3/31/2014   • Dental abscess 4/14/2015   • [facility] ([facility]    • Diabetes mellitus ([facility]    • Diabetic retinopathy of both eyes ([facility] 2/12/2014    mild   • Diverticulitis    • Easy bruising    • Exposure to medical diagnostic radiation     2008   • F philippe GERONIMO's                Postural Control:  4-Stage Balance Test:   - Feet together: 0 sec   - Modified Tandem:  2 sec   - Tandem Stance: 0 sec Fall Risk: yes   - SLS: R 0 sec, L 0 sec Fall Risk: yes  [Full tandem stance <10 sec indicates increased risk o walking speed between normal, fast and slow speeds. (2)  Mild Impairment: Is able to change speed but demonstrates mild gait deviations, or no gait deviations but unable to achieve a significant change in velocity, or uses an assistive device.    (1)  Mod for importance of remaining active.    Patient was instructed in and issued a HEP for: quad sets 5 sec x10, stand<>sit x3, LTR x10, supine-abdominal bracing + march x10, SLS attempts R/L x10, isometric hip add 10 sec x10, resisted ER RTB-latex free x10 BID [phone].  If you have any questions, please contact me at Dept: [phone]    Sincerely,  Electronically signed by therapist: Idalia Cruz, PT  [de-identified] certification required: Yes  I certify the need for these services furnished under this p

## 2020-02-05 NOTE — PROGRESS NOTES
Dx: Lymphedema (I89.0)           Authorized # of Visits:  5/24        Next MD visit/plan renewal:  2/27/20  Fall Risk: HIGH          Precautions:  Left UE limb precautions; HIGH FALL RISK; cardiac; lymphedema; wound/infection risk         Subjective:   *Pt tissue hydration in lower leg. Positive Stemmer's sign. *Right heel with \"spongy\" quality. Increased pain with palpation of lateral surface.     MEASUREMENTS:  *Left LE circumferential lymphedema volume has decreased by 17.7cm since initial eval.  *Right min  Total Treatment Time: 60 min

## 2020-02-06 ENCOUNTER — TELEPHONE (OUTPATIENT)
Dept: FAMILY MEDICINE CLINIC | Facility: CLINIC | Age: 70
End: 2020-02-06

## 2020-02-06 NOTE — TELEPHONE ENCOUNTER
Pt states that she called her Insurance [employer] and was notified that she is entitled to more visits with Podiatrist. (every 2 mo and not 3 mo) can we add additional ? She has her appt on 3/5/20 for toenails

## 2020-02-06 NOTE — TELEPHONE ENCOUNTER
Please call patient and inform her that the referral stated she has 3 visits that she can have anytime she wants beginning 9/27/19 through 9/26/20

## 2020-02-07 ENCOUNTER — OFFICE VISIT (OUTPATIENT)
Dept: OCCUPATIONAL MEDICINE | Facility: HOSPITAL | Age: 70
End: 2020-02-07
Attending: SURGERY
Payer: MEDICARE

## 2020-02-07 PROCEDURE — 97140 MANUAL THERAPY 1/> REGIONS: CPT

## 2020-02-07 NOTE — PROGRESS NOTES
Dx: Lymphedema (I89.0)           Authorized # of Visits:  6/24        Next MD visit/plan renewal:  2/27/20  Fall Risk: HIGH          Precautions:  Left UE limb precautions; HIGH FALL RISK; cardiac; lymphedema; wound/infection risk         Subjective:   *Pt dead skin from distal lower legs. Observed reduction in Right dorsal foot volume with improved superficial tissue mobility by end of session. *Skin hygiene/care completed. *Applied compression over bilateral lower legs.     COMPRESSION:  *Left ankle/li

## 2020-02-10 ENCOUNTER — OFFICE VISIT (OUTPATIENT)
Dept: OCCUPATIONAL MEDICINE | Facility: HOSPITAL | Age: 70
End: 2020-02-10
Attending: SURGERY
Payer: MEDICARE

## 2020-02-10 ENCOUNTER — OFFICE VISIT (OUTPATIENT)
Dept: PHYSICAL THERAPY | Facility: HOSPITAL | Age: 70
End: 2020-02-10
Attending: FAMILY MEDICINE
Payer: MEDICARE

## 2020-02-10 PROCEDURE — 97140 MANUAL THERAPY 1/> REGIONS: CPT

## 2020-02-10 PROCEDURE — 97112 NEUROMUSCULAR REEDUCATION: CPT

## 2020-02-10 PROCEDURE — 97110 THERAPEUTIC EXERCISES: CPT

## 2020-02-10 NOTE — PROGRESS NOTES
Dx: Lymphedema (I89.0)           Authorized # of Visits:  7/24        Next MD visit/plan renewal:  2/27/20  Fall Risk: HIGH          Precautions:  Left UE limb precautions; HIGH FALL RISK; cardiac; lymphedema; wound/infection risk         Subjective:   *Pt Increased pain with palpation of lateral surface. MEASUREMENTS:  None taken   TREATMENT:  *Bilateral lower quadrant manual lymph drainage with gentle soft tissue mobilization of dense areas of lymphedema.  Less sloughing of dead skin from distal lower le

## 2020-02-10 NOTE — PROGRESS NOTES
Dx: Gait instability, muscle weakness-generalized           Authorized # of Visits:  No auth, POC 20 visits         Next MD visit: none scheduled  Fall Risk: fall risk        Precautions: latex allergy          Subjective: Feels very tired today.  Was doing category that applies. (3)  Normal: Walks 20’, no assistive devices, good speed, no evidence of imbalance, normal gait pattern. (2)  Mild Impairment: Walks 20’, uses assistive devices, slower speed, mild gait deviations.   (1)  Moderate Impairment: Walks gait.  (2) Mild Impairment: Preforms head turns smoothly with slight change in gait velocity, i.e., minor disruption to smooth gait path or uses walking aid.   (1) Moderate Impairment: Preforms head turns with moderate change in gait velocity, slows down, s hip abd x10 R/L ea     ASU R, L leading x10 ea    Hurdles (3)  Leading R,L x5 laps    Mini squat x5 with abdominal bracing     Transfer training             Charges: TEx2 (30), NM Re-ed (15)   Total Timed Treatment: 45 min     Total Treatment Time: 45 min

## 2020-02-12 ENCOUNTER — OFFICE VISIT (OUTPATIENT)
Dept: OCCUPATIONAL MEDICINE | Facility: HOSPITAL | Age: 70
End: 2020-02-12
Attending: SURGERY
Payer: MEDICARE

## 2020-02-12 ENCOUNTER — OFFICE VISIT (OUTPATIENT)
Dept: PHYSICAL THERAPY | Facility: HOSPITAL | Age: 70
End: 2020-02-12
Attending: FAMILY MEDICINE
Payer: MEDICARE

## 2020-02-12 PROCEDURE — 97140 MANUAL THERAPY 1/> REGIONS: CPT

## 2020-02-12 PROCEDURE — 97535 SELF CARE MNGMENT TRAINING: CPT

## 2020-02-12 PROCEDURE — 97110 THERAPEUTIC EXERCISES: CPT

## 2020-02-12 NOTE — PROGRESS NOTES
Dx: Lymphedema (I89.0)           Authorized # of Visits:  8/24        Next MD visit/plan renewal:  2/27/20  Fall Risk: HIGH          Precautions:  Left UE limb precautions; HIGH FALL RISK; cardiac; lymphedema; wound/infection risk         Subjective:   *Pt TREATMENT:  *Farrow Wrap garments for Right lower leg delivered.  Garment specifications: Alexy [employer] Classic lower leg garment, size XS, Regular; Farrow Wrap Classic footpiece, size XS, Regular  *Instruction provided in how to don  and garme Velcro-closure garment lower leg and ankle/foot garments. 10 sessions     4. Reduce bilateral LE lymphedema volume by 20-50cm to allow pt to fit into shoes and to reduce weight of limbs to improve speed and safety during gait. 24 sessions  5.  Reduce bilate

## 2020-02-12 NOTE — PROGRESS NOTES
Dx: Gait instability, muscle weakness-generalized           Authorized # of Visits:  No auth, POC 20 visits         Next MD visit: none scheduled  Fall Risk: fall risk        Precautions: latex allergy          Subjective: Hx of stroke 3 years ago; HA 5 ye surface: 1  Grading: Donte the lowest category that applies. (3)  Normal: Walks 20’, no assistive devices, good speed, no evidence of imbalance, normal gait pattern.   (2)  Mild Impairment: Walks 20’, uses assistive devices, slower speed, mild gait deviatio head turns smoothly with no change in gait. (2) Mild Impairment: Preforms head turns smoothly with slight change in gait velocity, i.e., minor disruption to smooth gait path or uses walking aid.   (1) Moderate Impairment: Preforms head turns with moderate F/B x1 min w UE    Tilt board center attempt balance x1'    SLS attempts R/L x1'    SS 1 lap P bars w UE    Glut med hip abd x10 R/L ea     ASU R, L leading x10 ea    Hurdles (3)  Leading R,L x5 laps    Mini squat x5 with abdominal bracing     Transfer tra

## 2020-02-14 ENCOUNTER — OFFICE VISIT (OUTPATIENT)
Dept: OCCUPATIONAL MEDICINE | Facility: HOSPITAL | Age: 70
End: 2020-02-14
Attending: SURGERY
Payer: MEDICARE

## 2020-02-14 PROCEDURE — 97140 MANUAL THERAPY 1/> REGIONS: CPT

## 2020-02-14 NOTE — PROGRESS NOTES
Dx: Lymphedema (I89.0)           Authorized # of Visits:  9/24        Next MD visit/plan renewal:  2/27/20  Fall Risk: HIGH          Precautions:  Left UE limb precautions; HIGH FALL RISK; cardiac; lymphedema; wound/infection risk         Subjective:   *Pt tissue mobility. Ankle is densely boggy to boggy, pitting with fair superficial tissue mobility. Dorsum of foot is boggy, pitting. Distal 1/2 of lower leg has medium pink coloration with less dry, scaly tissue. Fair tissue hydration in lower leg.  Positive decongestive exercises. 6 sessions   3. Pt will obtain new Velcro-closure garment lower leg and ankle/foot garments. 10 sessions     4.  Reduce bilateral LE lymphedema volume by 20-50cm to allow pt to fit into shoes and to reduce weight of limbs to improve

## 2020-02-17 ENCOUNTER — OFFICE VISIT (OUTPATIENT)
Dept: FAMILY MEDICINE CLINIC | Facility: CLINIC | Age: 70
End: 2020-02-17
Payer: MEDICARE

## 2020-02-17 ENCOUNTER — TELEPHONE (OUTPATIENT)
Dept: FAMILY MEDICINE CLINIC | Facility: CLINIC | Age: 70
End: 2020-02-17

## 2020-02-17 ENCOUNTER — OFFICE VISIT (OUTPATIENT)
Dept: PHYSICAL THERAPY | Facility: HOSPITAL | Age: 70
End: 2020-02-17
Attending: FAMILY MEDICINE
Payer: MEDICARE

## 2020-02-17 ENCOUNTER — OFFICE VISIT (OUTPATIENT)
Dept: OCCUPATIONAL MEDICINE | Facility: HOSPITAL | Age: 70
End: 2020-02-17
Attending: SURGERY
Payer: MEDICARE

## 2020-02-17 VITALS
BODY MASS INDEX: 31.02 KG/M2 | SYSTOLIC BLOOD PRESSURE: 120 MMHG | RESPIRATION RATE: 16 BRPM | DIASTOLIC BLOOD PRESSURE: 68 MMHG | HEIGHT: 65 IN | TEMPERATURE: 97 F | HEART RATE: 80 BPM | WEIGHT: 186.19 LBS

## 2020-02-17 DIAGNOSIS — I10 HYPERTENSION GOAL BP (BLOOD PRESSURE) < 130/80: Chronic | ICD-10-CM

## 2020-02-17 DIAGNOSIS — I63.9 BRAINSTEM INFARCTION (HCC): ICD-10-CM

## 2020-02-17 DIAGNOSIS — S32.040D COMPRESSION FRACTURE OF L4 VERTEBRA WITH ROUTINE HEALING, SUBSEQUENT ENCOUNTER: Chronic | ICD-10-CM

## 2020-02-17 DIAGNOSIS — E11.40 TYPE 2 DIABETES MELLITUS WITH DIABETIC NEUROPATHY, WITH LONG-TERM CURRENT USE OF INSULIN (HCC): ICD-10-CM

## 2020-02-17 DIAGNOSIS — I70.0 ATHEROSCLEROSIS OF AORTA (HCC): Chronic | ICD-10-CM

## 2020-02-17 DIAGNOSIS — L97.521 SKIN ULCER OF THIRD TOE OF LEFT FOOT, LIMITED TO BREAKDOWN OF SKIN (HCC): ICD-10-CM

## 2020-02-17 DIAGNOSIS — L03.032 CELLULITIS OF THIRD TOE OF LEFT FOOT: Primary | ICD-10-CM

## 2020-02-17 DIAGNOSIS — M47.816 ARTHRITIS OF FACET JOINT OF LUMBAR SPINE: ICD-10-CM

## 2020-02-17 DIAGNOSIS — D32.9 BENIGN MENINGIOMA (HCC): ICD-10-CM

## 2020-02-17 DIAGNOSIS — Z79.4 TYPE 2 DIABETES MELLITUS WITH DIABETIC NEUROPATHY, WITH LONG-TERM CURRENT USE OF INSULIN (HCC): ICD-10-CM

## 2020-02-17 DIAGNOSIS — I69.351 HEMIPARESIS AFFECTING RIGHT SIDE AS LATE EFFECT OF STROKE (HCC): ICD-10-CM

## 2020-02-17 DIAGNOSIS — C50.312 MALIGNANT NEOPLASM OF LOWER-INNER QUADRANT OF LEFT FEMALE BREAST, UNSPECIFIED ESTROGEN RECEPTOR STATUS (HCC): Chronic | ICD-10-CM

## 2020-02-17 DIAGNOSIS — F32.1 MODERATE SINGLE CURRENT EPISODE OF MAJOR DEPRESSIVE DISORDER (HCC): ICD-10-CM

## 2020-02-17 PROCEDURE — 97110 THERAPEUTIC EXERCISES: CPT

## 2020-02-17 PROCEDURE — 97140 MANUAL THERAPY 1/> REGIONS: CPT

## 2020-02-17 PROCEDURE — 99214 OFFICE O/P EST MOD 30 MIN: CPT | Performed by: FAMILY MEDICINE

## 2020-02-17 RX ORDER — CEPHALEXIN 500 MG/1
500 CAPSULE ORAL 3 TIMES DAILY
Qty: 21 CAPSULE | Refills: 0 | Status: SHIPPED | OUTPATIENT
Start: 2020-02-17 | End: 2020-02-24

## 2020-02-17 RX ORDER — ATORVASTATIN CALCIUM 40 MG/1
40 TABLET, FILM COATED ORAL NIGHTLY
Qty: 90 TABLET | Refills: 3 | Status: SHIPPED | OUTPATIENT
Start: 2020-02-17 | End: 2021-01-01

## 2020-02-17 RX ORDER — VERAPAMIL HYDROCHLORIDE 240 MG/1
240 CAPSULE, EXTENDED RELEASE ORAL NIGHTLY
Qty: 90 CAPSULE | Refills: 3 | Status: SHIPPED | OUTPATIENT
Start: 2020-02-17

## 2020-02-17 NOTE — TELEPHONE ENCOUNTER
Talked to OT and they wondered if she should go to PT, The wound is beneath the 3rd toe she first noticied it on Friday. I told them that yes she can go to PT today and we will look at it during the visit .

## 2020-02-17 NOTE — PROGRESS NOTES
Dx: Gait instability, muscle weakness-generalized           Authorized # of Visits:  No auth, POC 20 visits         Next MD visit: none scheduled  Fall Risk: fall risk        Precautions: latex allergy          Subjective: Has opening L 3rd digit opening. applies. (3)  Normal: Walks 20’, no assistive devices, good speed, no evidence of imbalance, normal gait pattern. (2)  Mild Impairment: Walks 20’, uses assistive devices, slower speed, mild gait deviations.   (1)  Moderate Impairment: Walks 20’, slow spee Impairment: Preforms head turns smoothly with slight change in gait velocity, i.e., minor disruption to smooth gait path or uses walking aid.   (1) Moderate Impairment: Preforms head turns with moderate change in gait velocity, slows down, staggers but cem R, L leading x10 ea    Hurdles (3)  Leading R,L x5 laps    Mini squat x5 with abdominal bracing     Transfer training   NU step L1 x 6'    SB rolls x 20 DKTC  SB rolls x 10 L      Quad sets x 10    SLR x 6 on R  X 10 on L  SAQ x 10 L/R  X 4 more on R    Br

## 2020-02-17 NOTE — TELEPHONE ENCOUNTER
Pt is at [address] [place] [address] from there has discovered a wound on her toe and I made appt at 6:45pm and she has PT inbetween and should she go with this wound.

## 2020-02-17 NOTE — PROGRESS NOTES
Dx: Lymphedema (I89.0)           Authorized # of Visits:  10/24        Next MD visit/plan renewal:  2/27/20  Fall Risk: HIGH          Precautions:  Left UE limb precautions; HIGH FALL RISK; cardiac; lymphedema; wound/infection risk         Subjective:   *P *Right LE: Tissue quality over thigh is supple proximally transitioning to slightly boggy superior to the knee. Knee is slightly boggy over superior surface; transitions to boggy, pitting at mid-patella.  Proximal 1/2 of lower leg is boggy, pitting with p  for assistance with donning; pt stating she did not; wanted to be independent. Discussed importance of correct application and benefits of family assist at this time to don lower leg garment as well as ankle/foot garment.  Once garment and liner rem decongestive exercises ACHIEVED   2. Pt will be independent in decongestive exercises. 6 sessions   3. Pt will obtain new Velcro-closure garment lower leg and ankle/foot garments. 10 sessions     4.  Reduce bilateral LE lymphedema volume by 20-50cm to allow

## 2020-02-18 ENCOUNTER — APPOINTMENT (OUTPATIENT)
Dept: OCCUPATIONAL MEDICINE | Facility: HOSPITAL | Age: 70
End: 2020-02-18
Attending: SURGERY
Payer: MEDICARE

## 2020-02-18 ENCOUNTER — TELEPHONE (OUTPATIENT)
Dept: FAMILY MEDICINE CLINIC | Facility: CLINIC | Age: 70
End: 2020-02-18

## 2020-02-18 NOTE — PROGRESS NOTES
Patient presents with:  Wound: on left foot       Subjective   HPI:   This is a 77year old female coming in for open wound on her left third toe. She has severe nephropathy and history of osteomyelitis and has had issues before.   No fever chills nausea o Musculoskeletal:        Right foot: There is decreased range of motion and deformity. There is no right foot bunion. Left foot: There is decreased range of motion and deformity. There is no left foot bunion.         Feet:    Feet:     Right Foot:   M Mental Health    Moderate single current episode of major depressive disorder ([facility]    Overview     Occurring after stroke in fall 2016, not ready for medications so counseling recommended            Musculoskeletal    Arthritis of facet joint of lumbar s Hypertension currently stable but needs meds refilled, skin breakdown of the skin for which I am arranging for wound care follow-up.   Wound was redressed and its not a deep wound at this point but she has had extensive osteomyelitis in the last year and ha

## 2020-02-18 NOTE — TELEPHONE ENCOUNTER
TC to patient to ask if she was able to make appt with wound clinic. Patient states she was told by Dr. Cecilia Kendall that we would call wound clinic for her.   I placed a call to the wound clinic and left message for them to please contact the patient as Dr. Cecilia Kendall h

## 2020-02-19 ENCOUNTER — APPOINTMENT (OUTPATIENT)
Dept: PHYSICAL THERAPY | Facility: HOSPITAL | Age: 70
End: 2020-02-19
Attending: FAMILY MEDICINE
Payer: MEDICARE

## 2020-02-20 ENCOUNTER — TELEPHONE (OUTPATIENT)
Dept: FAMILY MEDICINE CLINIC | Facility: CLINIC | Age: 70
End: 2020-02-20

## 2020-02-20 NOTE — TELEPHONE ENCOUNTER
Called patient she is having a lot of diarrhea she said that Dr Nelson Aranda said he would change the antibiotic for her

## 2020-02-20 NOTE — TELEPHONE ENCOUNTER
TC to patient to check and make sure she has appt with wound clinic. Patient states she has appt on 2/25/2020.    Just FYI patient saw Dr. Asaf Herrera yesterday who said her EKG was abnormal and she might have had a heart attack and didn't even realize it

## 2020-02-21 ENCOUNTER — APPOINTMENT (OUTPATIENT)
Dept: OCCUPATIONAL MEDICINE | Facility: HOSPITAL | Age: 70
End: 2020-02-21
Attending: SURGERY
Payer: MEDICARE

## 2020-02-24 ENCOUNTER — OFFICE VISIT (OUTPATIENT)
Dept: OCCUPATIONAL MEDICINE | Facility: HOSPITAL | Age: 70
End: 2020-02-24
Attending: SURGERY
Payer: MEDICARE

## 2020-02-24 ENCOUNTER — OFFICE VISIT (OUTPATIENT)
Dept: PHYSICAL THERAPY | Facility: HOSPITAL | Age: 70
End: 2020-02-24
Attending: FAMILY MEDICINE
Payer: MEDICARE

## 2020-02-24 PROCEDURE — 97140 MANUAL THERAPY 1/> REGIONS: CPT

## 2020-02-24 PROCEDURE — 97110 THERAPEUTIC EXERCISES: CPT

## 2020-02-24 NOTE — PROGRESS NOTES
Dx: Gait instability, muscle weakness-generalized           Authorized # of Visits:  No auth, POC 20 visits         Next MD visit: none scheduled  Fall Risk: fall risk        Precautions: latex allergy          Subjective: Just took a shower and feels very UE    Glut med hip abd x10 R/L ea     ASU R, L leading x10 ea    Hurdles (3)  Leading R,L x5 laps    Mini squat x5 with abdominal bracing     Transfer training   NU step L1 x 6'    SB rolls x 20 DKTC  SB rolls x 10 L      Quad sets x 10    SLR x 6 on R  X

## 2020-02-24 NOTE — PROGRESS NOTES
Progress Summary  Pt has attended 11/24 visits in Occupational Therapy. Subjective:   Pt reports she is wearing her Juxtalite garment on her Right lower leg today as she washed her Farrow Wrap garments last night and they are not dry yet.  Reports saw PCP.     EDEMA/TISSUE QUALITY:   *Circumferential volume:  By 2/14, Left UE volume had reduced by as much as 22.5cm and Right LE had reduced by as much as 49.4cm.   Today, both limbs showing an 11-12cm rise in volume resulting in a total reduction of Left L 4. Reduce bilateral LE lymphedema volume by 20-50cm to allow pt to fit into shoes and to reduce weight of limbs to improve speed and safety during gait. 24 sessions  5.  Reduce bilateral lower leg lymphedema density to boggy to slightly boggy to reduce in surface; transitions to boggy, pitting at mid-patella. Proximal 1/2 of lower leg is boggy, pitting with poor superficial tissue mobility; distal 1/2 is densely boggy to boggy, pitting with poor superficial tissue mobility.  Ankle is densely boggy to boggy, precautions for life-long self-management of lymphedema. 24 sessions     Plan:   Continue current plan.       Charges: 4 Manual Therapy    Total Timed Treatment: 55 min  Total Treatment Time: 60 min

## 2020-02-25 ENCOUNTER — OFFICE VISIT (OUTPATIENT)
Dept: WOUND CARE | Facility: HOSPITAL | Age: 70
End: 2020-02-25
Attending: NURSE PRACTITIONER

## 2020-02-26 ENCOUNTER — APPOINTMENT (OUTPATIENT)
Dept: PHYSICAL THERAPY | Facility: HOSPITAL | Age: 70
End: 2020-02-26
Attending: FAMILY MEDICINE
Payer: MEDICARE

## 2020-03-02 ENCOUNTER — LAB ENCOUNTER (OUTPATIENT)
Dept: LAB | Age: 70
End: 2020-03-02
Attending: INTERNAL MEDICINE
Payer: MEDICARE

## 2020-03-02 ENCOUNTER — APPOINTMENT (OUTPATIENT)
Dept: PHYSICAL THERAPY | Facility: HOSPITAL | Age: 70
End: 2020-03-02
Attending: FAMILY MEDICINE
Payer: MEDICARE

## 2020-03-02 ENCOUNTER — HOSPITAL ENCOUNTER (OUTPATIENT)
Dept: CV DIAGNOSTICS | Facility: HOSPITAL | Age: 70
Discharge: HOME | End: 2020-03-02
Attending: INTERNAL MEDICINE
Payer: MEDICARE

## 2020-03-02 ENCOUNTER — HOSPITAL ENCOUNTER (OUTPATIENT)
Dept: MAMMOGRAPHY | Facility: HOSPITAL | Age: 70
Discharge: HOME | End: 2020-03-02
Attending: INTERNAL MEDICINE
Payer: MEDICARE

## 2020-03-02 DIAGNOSIS — E04.2 MULTINODULAR GOITER (NONTOXIC): ICD-10-CM

## 2020-03-02 DIAGNOSIS — Z12.31 ENCOUNTER FOR SCREENING MAMMOGRAM FOR MALIGNANT NEOPLASM OF BREAST: ICD-10-CM

## 2020-03-02 DIAGNOSIS — Z23 NEED FOR INFLUENZA VACCINATION: ICD-10-CM

## 2020-03-02 DIAGNOSIS — E11.319 DIABETIC RETINOPATHY OF BOTH EYES ASSOCIATED WITH TYPE 2 DIABETES MELLITUS, MACULAR EDEMA PRESENCE UNSPECIFIED, UNSPECIFIED RETINOPATHY SEVERITY (HCC): Chronic | ICD-10-CM

## 2020-03-02 DIAGNOSIS — I10 ESSENTIAL HYPERTENSION: ICD-10-CM

## 2020-03-02 DIAGNOSIS — D64.9 ANEMIA, UNSPECIFIED TYPE: ICD-10-CM

## 2020-03-02 DIAGNOSIS — E11.40 TYPE 2 DIABETES MELLITUS WITH DIABETIC NEUROPATHY, WITH LONG-TERM CURRENT USE OF INSULIN (HCC): ICD-10-CM

## 2020-03-02 DIAGNOSIS — R94.31 ABNORMAL EKG: ICD-10-CM

## 2020-03-02 DIAGNOSIS — R06.00 DOE (DYSPNEA ON EXERTION): ICD-10-CM

## 2020-03-02 DIAGNOSIS — E78.5 HYPERLIPIDEMIA LDL GOAL <100: Chronic | ICD-10-CM

## 2020-03-02 DIAGNOSIS — Z79.4 TYPE 2 DIABETES MELLITUS WITH DIABETIC NEUROPATHY, WITH LONG-TERM CURRENT USE OF INSULIN (HCC): ICD-10-CM

## 2020-03-02 DIAGNOSIS — I25.10 CORONARY ARTERY DISEASE INVOLVING NATIVE CORONARY ARTERY OF NATIVE HEART WITHOUT ANGINA PECTORIS: ICD-10-CM

## 2020-03-02 DIAGNOSIS — E55.9 VITAMIN D DEFICIENCY: ICD-10-CM

## 2020-03-02 DIAGNOSIS — I25.10 CAD, MULTIPLE VESSEL: Chronic | ICD-10-CM

## 2020-03-02 DIAGNOSIS — R53.83 OTHER FATIGUE: ICD-10-CM

## 2020-03-02 LAB
ALBUMIN SERPL-MCNC: 3.6 G/DL (ref 3.4–5)
ALBUMIN/GLOB SERPL: 0.9 {RATIO} (ref 1–2)
ALP LIVER SERPL-CCNC: 78 U/L (ref 55–142)
ALT SERPL-CCNC: 21 U/L (ref 13–56)
ANION GAP SERPL CALC-SCNC: 6 MMOL/L (ref 0–18)
AST SERPL-CCNC: 10 U/L (ref 15–37)
BASOPHILS # BLD AUTO: 0.04 X10(3) UL (ref 0–0.2)
BASOPHILS NFR BLD AUTO: 0.7 %
BILIRUB SERPL-MCNC: 0.9 MG/DL (ref 0.1–2)
BUN BLD-MCNC: 27 MG/DL (ref 7–18)
BUN/CREAT SERPL: 29.3 (ref 10–20)
CALCIUM BLD-MCNC: 8.4 MG/DL (ref 8.5–10.1)
CHLORIDE SERPL-SCNC: 104 MMOL/L (ref 98–112)
CHOLEST SMN-MCNC: 158 MG/DL (ref ?–200)
CO2 SERPL-SCNC: 29 MMOL/L (ref 21–32)
CREAT BLD-MCNC: 0.92 MG/DL (ref 0.55–1.02)
DEPRECATED RDW RBC AUTO: 41.1 FL (ref 35.1–46.3)
EOSINOPHIL # BLD AUTO: 0.14 X10(3) UL (ref 0–0.7)
EOSINOPHIL NFR BLD AUTO: 2.3 %
ERYTHROCYTE [DISTWIDTH] IN BLOOD BY AUTOMATED COUNT: 12.5 % (ref 11–15)
EST. AVERAGE GLUCOSE BLD GHB EST-MCNC: 160 MG/DL (ref 68–126)
GLOBULIN PLAS-MCNC: 4.2 G/DL (ref 2.8–4.4)
GLUCOSE BLD-MCNC: 121 MG/DL (ref 70–99)
HBA1C MFR BLD HPLC: 7.2 % (ref ?–5.7)
HCT VFR BLD AUTO: 37 % (ref 35–48)
HDLC SERPL-MCNC: 56 MG/DL (ref 40–59)
HGB BLD-MCNC: 12.3 G/DL (ref 12–16)
IMM GRANULOCYTES # BLD AUTO: 0.01 X10(3) UL (ref 0–1)
IMM GRANULOCYTES NFR BLD: 0.2 %
LDLC SERPL CALC-MCNC: 82 MG/DL (ref ?–100)
LYMPHOCYTES # BLD AUTO: 1.54 X10(3) UL (ref 1–4)
LYMPHOCYTES NFR BLD AUTO: 25.5 %
M PROTEIN MFR SERPL ELPH: 7.8 G/DL (ref 6.4–8.2)
MCH RBC QN AUTO: 30.1 PG (ref 26–34)
MCHC RBC AUTO-ENTMCNC: 33.2 G/DL (ref 31–37)
MCV RBC AUTO: 90.7 FL (ref 80–100)
MONOCYTES # BLD AUTO: 0.4 X10(3) UL (ref 0.1–1)
MONOCYTES NFR BLD AUTO: 6.6 %
NEUTROPHILS # BLD AUTO: 3.91 X10 (3) UL (ref 1.5–7.7)
NEUTROPHILS # BLD AUTO: 3.91 X10(3) UL (ref 1.5–7.7)
NEUTROPHILS NFR BLD AUTO: 64.7 %
NONHDLC SERPL-MCNC: 102 MG/DL (ref ?–130)
OSMOLALITY SERPL CALC.SUM OF ELEC: 294 MOSM/KG (ref 275–295)
PATIENT FASTING Y/N/NP: YES
PATIENT FASTING Y/N/NP: YES
PLATELET # BLD AUTO: 220 10(3)UL (ref 150–450)
POTASSIUM SERPL-SCNC: 4.3 MMOL/L (ref 3.5–5.1)
RBC # BLD AUTO: 4.08 X10(6)UL (ref 3.8–5.3)
SODIUM SERPL-SCNC: 139 MMOL/L (ref 136–145)
T4 FREE SERPL-MCNC: 0.9 NG/DL (ref 0.8–1.7)
TRIGL SERPL-MCNC: 99 MG/DL (ref 30–149)
TSI SER-ACNC: 0.98 MIU/ML (ref 0.36–3.74)
VLDLC SERPL CALC-MCNC: 20 MG/DL (ref 0–30)
WBC # BLD AUTO: 6 X10(3) UL (ref 4–11)

## 2020-03-02 PROCEDURE — 84439 ASSAY OF FREE THYROXINE: CPT

## 2020-03-02 PROCEDURE — 93018 CV STRESS TEST I&R ONLY: CPT | Performed by: INTERNAL MEDICINE

## 2020-03-02 PROCEDURE — 83036 HEMOGLOBIN GLYCOSYLATED A1C: CPT

## 2020-03-02 PROCEDURE — 80061 LIPID PANEL: CPT

## 2020-03-02 PROCEDURE — 78452 HT MUSCLE IMAGE SPECT MULT: CPT | Performed by: INTERNAL MEDICINE

## 2020-03-02 PROCEDURE — 84443 ASSAY THYROID STIM HORMONE: CPT

## 2020-03-02 PROCEDURE — 36415 COLL VENOUS BLD VENIPUNCTURE: CPT

## 2020-03-02 PROCEDURE — 85025 COMPLETE CBC W/AUTO DIFF WBC: CPT

## 2020-03-02 PROCEDURE — 93017 CV STRESS TEST TRACING ONLY: CPT | Performed by: INTERNAL MEDICINE

## 2020-03-02 PROCEDURE — 77063 BREAST TOMOSYNTHESIS BI: CPT | Performed by: INTERNAL MEDICINE

## 2020-03-02 PROCEDURE — 80053 COMPREHEN METABOLIC PANEL: CPT

## 2020-03-02 PROCEDURE — 77067 SCR MAMMO BI INCL CAD: CPT | Performed by: INTERNAL MEDICINE

## 2020-03-03 ENCOUNTER — OFFICE VISIT (OUTPATIENT)
Dept: OCCUPATIONAL MEDICINE | Facility: HOSPITAL | Age: 70
End: 2020-03-03
Attending: SURGERY
Payer: MEDICARE

## 2020-03-03 ENCOUNTER — APPOINTMENT (OUTPATIENT)
Dept: LAB | Age: 70
End: 2020-03-03
Attending: FAMILY MEDICINE
Payer: MEDICARE

## 2020-03-03 DIAGNOSIS — E11.40 TYPE 2 DIABETES MELLITUS WITH DIABETIC NEUROPATHY, WITH LONG-TERM CURRENT USE OF INSULIN (HCC): ICD-10-CM

## 2020-03-03 DIAGNOSIS — Z79.4 TYPE 2 DIABETES MELLITUS WITH DIABETIC NEUROPATHY, WITH LONG-TERM CURRENT USE OF INSULIN (HCC): ICD-10-CM

## 2020-03-03 LAB
CREAT UR-SCNC: 140 MG/DL
MICROALBUMIN UR-MCNC: 6.18 MG/DL
MICROALBUMIN/CREAT 24H UR-RTO: 44.1 UG/MG (ref ?–30)

## 2020-03-03 PROCEDURE — 82043 UR ALBUMIN QUANTITATIVE: CPT

## 2020-03-03 PROCEDURE — 82570 ASSAY OF URINE CREATININE: CPT

## 2020-03-03 PROCEDURE — 97140 MANUAL THERAPY 1/> REGIONS: CPT

## 2020-03-04 ENCOUNTER — APPOINTMENT (OUTPATIENT)
Dept: PHYSICAL THERAPY | Facility: HOSPITAL | Age: 70
End: 2020-03-04
Attending: FAMILY MEDICINE
Payer: MEDICARE

## 2020-03-04 NOTE — PROGRESS NOTES
Dx: Lymphedema (I89.0)           Authorized # of Visits:  12/24        Next MD visit/plan renewal:  3/24/20  Fall Risk: HIGH          Precautions:  Left UE limb precautions; HIGH FALL RISK; cardiac; lymphedema; wound/infection risk     Subjective:   *Pt re surface. MEASUREMENTS:  None taken   TREATMENT:  *Questioned why pt is not wearing Right lower leg Farrow Wrap garments. Pt stating that she re-laundered them as \"they smelled. \" Re-advised pt in incorrect application of Circaid garments.  Mi duff will be independent in use of compression garments, self-manual lymph drainage, decongestive exercises and lymphedema precautions for life-long self-management of lymphedema. 24 sessions     Plan:   Continue current plan.       Charges: 4 Manual Therapy

## 2020-03-06 ENCOUNTER — OFFICE VISIT (OUTPATIENT)
Dept: OCCUPATIONAL MEDICINE | Facility: HOSPITAL | Age: 70
End: 2020-03-06
Attending: SURGERY
Payer: MEDICARE

## 2020-03-06 ENCOUNTER — OFFICE VISIT (OUTPATIENT)
Dept: PHYSICAL THERAPY | Facility: HOSPITAL | Age: 70
End: 2020-03-06
Attending: FAMILY MEDICINE
Payer: MEDICARE

## 2020-03-06 ENCOUNTER — HOSPITAL ENCOUNTER (OUTPATIENT)
Dept: GENERAL RADIOLOGY | Facility: HOSPITAL | Age: 70
Discharge: HOME | End: 2020-03-06
Attending: INTERNAL MEDICINE
Payer: MEDICARE

## 2020-03-06 DIAGNOSIS — R94.31 ABNORMAL EKG: ICD-10-CM

## 2020-03-06 PROCEDURE — 97140 MANUAL THERAPY 1/> REGIONS: CPT

## 2020-03-06 PROCEDURE — 71046 X-RAY EXAM CHEST 2 VIEWS: CPT | Performed by: INTERNAL MEDICINE

## 2020-03-06 PROCEDURE — 97110 THERAPEUTIC EXERCISES: CPT

## 2020-03-06 NOTE — PROGRESS NOTES
Dx: Gait instability, muscle weakness-generalized           Authorized # of Visits:  No auth, POC 20 visits         Next MD visit: none scheduled  Fall Risk: fall risk        Precautions: latex allergy          Subjective: Moving around and getting up from leading x10 ea    Hurdles (3)  Leading R,L x5 laps    Mini squat x5 with abdominal bracing     Transfer training   NU step L1 x 6'    SB rolls x 20 DKTC  SB rolls x 10 L      Quad sets x 10    SLR x 6 on R  X 10 on L  SAQ x 10 L/R  X 4 more on R    Bridge

## 2020-03-06 NOTE — PROGRESS NOTES
Dx: Lymphedema (I89.0)           Authorized # of Visits:  13/24        Next MD visit/plan renewal:  3/24/20  Fall Risk: HIGH          Precautions:  Left UE limb precautions; HIGH FALL RISK; cardiac; lymphedema; wound/infection risk     Subjective:   *Pt re Farrow Wrap garments. Pt admitting that she is not wearing Farrow Wrap garments because they are harder to don. Has tried to self-apply only once; otherwise has been leaving on from session to session after therapist dons them.  Re-discussed providing careg Pt will be independent in use of compression garments, self-manual lymph drainage, decongestive exercises and lymphedema precautions for life-long self-management of lymphedema. 24 sessions     Plan:   Continue current plan.       Charges: 4 Manual Therapy

## 2020-03-09 ENCOUNTER — OFFICE VISIT (OUTPATIENT)
Dept: OCCUPATIONAL MEDICINE | Facility: HOSPITAL | Age: 70
End: 2020-03-09
Attending: SURGERY
Payer: MEDICARE

## 2020-03-09 PROCEDURE — 97140 MANUAL THERAPY 1/> REGIONS: CPT

## 2020-03-09 NOTE — PROGRESS NOTES
Dx: Lymphedema (I89.0)           Authorized # of Visits:  14/24        Next MD visit/plan renewal:  3/24/20  Fall Risk: HIGH          Precautions:  Left UE limb precautions; HIGH FALL RISK; cardiac; lymphedema; wound/infection risk     Subjective:   *Pt wi that with inconsistent or mis-applied garments over Right lower leg she will most likely be unable to attain her goal volume reduction and ability to transition into diabetic shoe; that she may need to settle for wearing larger sized garments on her Right precautions for life-long self-management of lymphedema. 24 sessions     Plan:   Continue current plan.       Charges: 4 Manual Therapy    Total Timed Treatment: 55 min  Total Treatment Time: 60 min

## 2020-03-10 ENCOUNTER — HOSPITAL ENCOUNTER (OUTPATIENT)
Dept: INTERVENTIONAL RADIOLOGY/VASCULAR | Facility: HOSPITAL | Age: 70
Discharge: HOME | End: 2020-03-10
Attending: INTERNAL MEDICINE | Admitting: INTERNAL MEDICINE
Payer: MEDICARE

## 2020-03-10 ENCOUNTER — TELEPHONE (OUTPATIENT)
Dept: FAMILY MEDICINE CLINIC | Facility: CLINIC | Age: 70
End: 2020-03-10

## 2020-03-10 VITALS
DIASTOLIC BLOOD PRESSURE: 65 MMHG | BODY MASS INDEX: 30.99 KG/M2 | TEMPERATURE: 98 F | SYSTOLIC BLOOD PRESSURE: 150 MMHG | OXYGEN SATURATION: 100 % | WEIGHT: 186 LBS | HEART RATE: 72 BPM | RESPIRATION RATE: 14 BRPM | HEIGHT: 65 IN

## 2020-03-10 DIAGNOSIS — R94.39 ABNORMAL STRESS TEST: ICD-10-CM

## 2020-03-10 LAB — GLUCOSE BLD-MCNC: 146 MG/DL (ref 70–99)

## 2020-03-10 PROCEDURE — 99152 MOD SED SAME PHYS/QHP 5/>YRS: CPT

## 2020-03-10 PROCEDURE — B2111ZZ FLUOROSCOPY OF MULTIPLE CORONARY ARTERIES USING LOW OSMOLAR CONTRAST: ICD-10-PCS | Performed by: INTERNAL MEDICINE

## 2020-03-10 PROCEDURE — 99153 MOD SED SAME PHYS/QHP EA: CPT

## 2020-03-10 PROCEDURE — 93460 R&L HRT ART/VENTRICLE ANGIO: CPT

## 2020-03-10 PROCEDURE — B2151ZZ FLUOROSCOPY OF LEFT HEART USING LOW OSMOLAR CONTRAST: ICD-10-PCS | Performed by: INTERNAL MEDICINE

## 2020-03-10 PROCEDURE — 4A023N8 MEASUREMENT OF CARDIAC SAMPLING AND PRESSURE, BILATERAL, PERCUTANEOUS APPROACH: ICD-10-PCS | Performed by: INTERNAL MEDICINE

## 2020-03-10 PROCEDURE — 82962 GLUCOSE BLOOD TEST: CPT

## 2020-03-10 RX ORDER — LIDOCAINE HYDROCHLORIDE 10 MG/ML
INJECTION, SOLUTION EPIDURAL; INFILTRATION; INTRACAUDAL; PERINEURAL
Status: COMPLETED
Start: 2020-03-10 | End: 2020-03-10

## 2020-03-10 RX ORDER — HEPARIN SODIUM 5000 [USP'U]/ML
INJECTION, SOLUTION INTRAVENOUS; SUBCUTANEOUS
Status: COMPLETED
Start: 2020-03-10 | End: 2020-03-10

## 2020-03-10 RX ORDER — MIDAZOLAM HYDROCHLORIDE 1 MG/ML
INJECTION INTRAMUSCULAR; INTRAVENOUS
Status: COMPLETED
Start: 2020-03-10 | End: 2020-03-10

## 2020-03-10 RX ORDER — SODIUM CHLORIDE 9 MG/ML
INJECTION, SOLUTION INTRAVENOUS
Status: DISCONTINUED | OUTPATIENT
Start: 2020-03-11 | End: 2020-03-10 | Stop reason: HOSPADM

## 2020-03-10 NOTE — PROGRESS NOTES
Patient received from cath lab s/p [facility]. Left groin site soft, no hematoma, dressing C/D/I. Pulses intact. Hemodynamics stable. Post-op orders received and implemented. Patient and family educated on flat bedrest, verbalize understanding.  Dr. Dany Luong at

## 2020-03-10 NOTE — H&P
This is a pre-cath H&P    [facility] 2 Cardiology         Eriberto Almanza   : 1950   MRN: [id number]   PCP: Jac Stapleton MD                     Assessment:  CAD, abnormal EKG, lack of stamina/fatigue,RITCHIE, abnormal stress test- with ab stent), 100% ramus instent restenosis  Cath 2014 failed radial approach-unable to pass through extremely tortuous innominate. Femoral approach performed: 70-80% apical LAD, 100% ramus instent restenosis fills retrogradely L to L collaterals, patent LCx sten Wears glasses              Past Surgical History:   Procedure Laterality Date   • ANGIOPLASTY (CORONARY)        1 stent per Dr Hannah Johnson   • BREAST SURGERY         left lumpectomy   • BREAST SURGERY PROCEDURE UNLISTED   08   •    ,  Concerns:        Caffeine Concern: Yes          one tea a day and 1-2 cups of coffee daily         Sleep Concern: Yes          sleeping too much, uses CPAP        Exercise: No        Seat Belt: Yes        Self-Exams: Yes          Self breast exam 2-3 times total) by mouth once a week. 30 capsule 3   • furosemide 20 MG Oral Tab Take 1 tablet (20 mg total) by mouth daily.  90 tablet 3   • glimepiride 4 MG Oral Tab Take 1 tablet (4 mg total) by mouth every morning before breakfast. 90 tablet 3   • insulin glargi             Asaf Herrera MD

## 2020-03-10 NOTE — PROCEDURES
[facility]  Angiogram Procedure Note    Angela Muro Location: Cath Lab    CSN [id number] MRN [id number]   Admission Date 3/10/2020 Procedure Date 3/10/2020   Attending Physician Jacek Birch MD Procedure Physician Vinayak Sanderson MD     Pre- performed. At the end of the procedure, the Cameron catheter and venous sheath were removed, manual pressure was held with successful hemostasis.   Selective injection into the arterial sheath with contrast revealed the sheath was placed above the SFA and pro contrast into the RCA showed a dominant vessel with a 40% mid stenosis. Closure: 6F Angioseal      Impression:   1) Patent LAD, LCx stents  2) 60-70% distal LAD unchanged from 2014  3) 100% ramus instent restenosis with left to left collaterals.   Vessel

## 2020-03-11 ENCOUNTER — APPOINTMENT (OUTPATIENT)
Dept: OCCUPATIONAL MEDICINE | Facility: HOSPITAL | Age: 70
End: 2020-03-11
Attending: SURGERY
Payer: MEDICARE

## 2020-03-13 ENCOUNTER — TELEPHONE (OUTPATIENT)
Dept: OCCUPATIONAL MEDICINE | Facility: HOSPITAL | Age: 70
End: 2020-03-13

## 2020-03-13 PROBLEM — R79.89 AZOTEMIA: Status: RESOLVED | Noted: 2019-09-26 | Resolved: 2020-03-13

## 2020-03-13 PROBLEM — L08.9 WOUND INFECTION: Status: RESOLVED | Noted: 2019-09-26 | Resolved: 2020-03-13

## 2020-03-13 PROBLEM — I87.312 CHRONIC VENOUS HYPERTENSION (IDIOPATHIC) WITH ULCER OF LEFT LOWER EXTREMITY (CODE) (HCC): Status: ACTIVE | Noted: 2017-02-21

## 2020-03-13 PROBLEM — T14.8XXA WOUND INFECTION: Status: RESOLVED | Noted: 2019-09-26 | Resolved: 2020-03-13

## 2020-03-13 PROBLEM — B35.1 ONYCHOMYCOSIS: Status: RESOLVED | Noted: 2017-02-02 | Resolved: 2020-03-13

## 2020-03-13 PROBLEM — R73.9 HYPERGLYCEMIA: Status: RESOLVED | Noted: 2019-09-26 | Resolved: 2020-03-13

## 2020-03-13 PROBLEM — Z23 NEED FOR INFLUENZA VACCINATION: Status: RESOLVED | Noted: 2019-09-16 | Resolved: 2020-03-13

## 2020-03-16 ENCOUNTER — TELEPHONE (OUTPATIENT)
Dept: OCCUPATIONAL MEDICINE | Facility: HOSPITAL | Age: 70
End: 2020-03-16

## 2020-03-16 ENCOUNTER — APPOINTMENT (OUTPATIENT)
Dept: OCCUPATIONAL MEDICINE | Facility: HOSPITAL | Age: 70
End: 2020-03-16
Attending: SURGERY
Payer: MEDICARE

## 2020-03-16 ENCOUNTER — TELEPHONE (OUTPATIENT)
Dept: FAMILY MEDICINE CLINIC | Facility: CLINIC | Age: 70
End: 2020-03-16

## 2020-03-16 NOTE — TELEPHONE ENCOUNTER
LM to cancel appt for today and to call back to reschedule or address any concerns they may have at this time.

## 2020-03-16 NOTE — TELEPHONE ENCOUNTER
Contacted pt to discuss department's initiative to protect at-risk patients from COVID-19 exposure. Discussed recommendations relative to pt's age and recent illness.  Explained that future appts are to be determined on a week by week basis and that therapi

## 2020-03-18 ENCOUNTER — APPOINTMENT (OUTPATIENT)
Dept: OCCUPATIONAL MEDICINE | Facility: HOSPITAL | Age: 70
End: 2020-03-18
Attending: SURGERY
Payer: MEDICARE

## 2020-03-20 ENCOUNTER — APPOINTMENT (OUTPATIENT)
Dept: OCCUPATIONAL MEDICINE | Facility: HOSPITAL | Age: 70
End: 2020-03-20
Attending: SURGERY
Payer: MEDICARE

## 2020-03-23 ENCOUNTER — APPOINTMENT (OUTPATIENT)
Dept: OCCUPATIONAL MEDICINE | Facility: HOSPITAL | Age: 70
End: 2020-03-23
Attending: SURGERY
Payer: MEDICARE

## 2020-03-25 ENCOUNTER — TELEPHONE (OUTPATIENT)
Dept: OCCUPATIONAL MEDICINE | Facility: HOSPITAL | Age: 70
End: 2020-03-25

## 2020-03-25 ENCOUNTER — APPOINTMENT (OUTPATIENT)
Dept: OCCUPATIONAL MEDICINE | Facility: HOSPITAL | Age: 70
End: 2020-03-25
Attending: SURGERY
Payer: MEDICARE

## 2020-03-25 NOTE — TELEPHONE ENCOUNTER
Called and LVM on identified number to discuss pt's current status and home self-management as well as to discuss extension of initiative to cancel therapy sessions for non-essential and at-risk patients.  Left therapist's direct phone number and requested

## 2020-03-26 ENCOUNTER — TELEPHONE (OUTPATIENT)
Dept: OCCUPATIONAL MEDICINE | Facility: HOSPITAL | Age: 70
End: 2020-03-26

## 2020-03-26 NOTE — TELEPHONE ENCOUNTER
Pt returned VM. Discussed pt's current status and home self-management. Pt stating she is wear Velcro-closure garments on her bilateral lower legs daily. Reports stopped wearing her Farrow Wrap garments on her Right lower leg.  Reports Right lower leg \"was

## 2020-03-27 ENCOUNTER — APPOINTMENT (OUTPATIENT)
Dept: OCCUPATIONAL MEDICINE | Facility: HOSPITAL | Age: 70
End: 2020-03-27
Attending: SURGERY
Payer: MEDICARE

## 2020-03-30 ENCOUNTER — APPOINTMENT (OUTPATIENT)
Dept: OCCUPATIONAL MEDICINE | Facility: HOSPITAL | Age: 70
End: 2020-03-30
Attending: SURGERY
Payer: MEDICARE

## 2020-03-31 ENCOUNTER — APPOINTMENT (OUTPATIENT)
Dept: OCCUPATIONAL MEDICINE | Facility: HOSPITAL | Age: 70
End: 2020-03-31
Attending: SURGERY
Payer: MEDICARE

## 2020-04-01 ENCOUNTER — TELEPHONE (OUTPATIENT)
Dept: NEUROLOGY | Facility: CLINIC | Age: 70
End: 2020-04-01

## 2020-04-01 NOTE — TELEPHONE ENCOUNTER
Spoke with Marlen Found at [employer]. States that they shipped out last refill 3/17/2020, pt should have gotten already. If not, to call them at [phone] for guidance. Spoke with pt, states she has not gotten. Will call number above.  Also schedule

## 2020-04-03 ENCOUNTER — APPOINTMENT (OUTPATIENT)
Dept: OCCUPATIONAL MEDICINE | Facility: HOSPITAL | Age: 70
End: 2020-04-03
Attending: SURGERY
Payer: MEDICARE

## 2020-04-06 ENCOUNTER — APPOINTMENT (OUTPATIENT)
Dept: OCCUPATIONAL MEDICINE | Facility: HOSPITAL | Age: 70
End: 2020-04-06
Attending: SURGERY
Payer: MEDICARE

## 2020-04-07 ENCOUNTER — APPOINTMENT (OUTPATIENT)
Dept: OCCUPATIONAL MEDICINE | Facility: HOSPITAL | Age: 70
End: 2020-04-07
Attending: SURGERY
Payer: MEDICARE

## 2020-04-08 ENCOUNTER — TELEPHONE (OUTPATIENT)
Dept: FAMILY MEDICINE CLINIC | Facility: CLINIC | Age: 70
End: 2020-04-08

## 2020-04-08 DIAGNOSIS — I89.0 LYMPHEDEMA: ICD-10-CM

## 2020-04-08 DIAGNOSIS — I87.312 CHRONIC VENOUS HYPERTENSION (IDIOPATHIC) WITH ULCER OF LEFT LOWER EXTREMITY (CODE) (HCC): Primary | ICD-10-CM

## 2020-04-08 DIAGNOSIS — L97.929 SKIN ULCER OF LEFT LOWER LEG, UNSPECIFIED ULCER STAGE (HCC): ICD-10-CM

## 2020-04-08 NOTE — TELEPHONE ENCOUNTER
Diagnoses and all orders for this visit:    Chronic venous hypertension (idiopathic) with ulcer of left lower extremity (CODE) (HCC)    Skin ulcer of left lower leg, unspecified ulcer stage ([facility]    Lymphedema       OK to use these codes on problem list. T

## 2020-04-08 NOTE — TELEPHONE ENCOUNTER
Received fax from [employer] requesting Certificate of Medical Necessity be completed and faxed back to [phone].  Form in triage

## 2020-04-08 NOTE — TELEPHONE ENCOUNTER
Called patient and yes she requested this because the shut down the lymphedema clinic due to the virus so she is requesting these so she can prevent the swelling.  forms placed in Dr Grover Art office

## 2020-04-10 ENCOUNTER — APPOINTMENT (OUTPATIENT)
Dept: OCCUPATIONAL MEDICINE | Facility: HOSPITAL | Age: 70
End: 2020-04-10
Attending: SURGERY
Payer: MEDICARE

## 2020-04-13 ENCOUNTER — APPOINTMENT (OUTPATIENT)
Dept: OCCUPATIONAL MEDICINE | Facility: HOSPITAL | Age: 70
End: 2020-04-13
Attending: SURGERY
Payer: MEDICARE

## 2020-04-15 ENCOUNTER — APPOINTMENT (OUTPATIENT)
Dept: OCCUPATIONAL MEDICINE | Facility: HOSPITAL | Age: 70
End: 2020-04-15
Attending: FAMILY MEDICINE
Payer: MEDICARE

## 2020-04-17 ENCOUNTER — APPOINTMENT (OUTPATIENT)
Dept: OCCUPATIONAL MEDICINE | Facility: HOSPITAL | Age: 70
End: 2020-04-17
Attending: SURGERY
Payer: MEDICARE

## 2020-04-20 ENCOUNTER — APPOINTMENT (OUTPATIENT)
Dept: OCCUPATIONAL MEDICINE | Facility: HOSPITAL | Age: 70
End: 2020-04-20
Attending: SURGERY
Payer: MEDICARE

## 2020-04-23 ENCOUNTER — HOSPITAL (OUTPATIENT)
Dept: OTHER | Age: 70
End: 2020-04-23

## 2020-04-24 ENCOUNTER — PRIOR ORIGINAL RECORDS (OUTPATIENT)
Dept: OTHER | Age: 70
End: 2020-04-24

## 2020-04-24 PROCEDURE — 99441 TELEPHONE E&M BY PHYSICIAN EST PT NOT ORIG PREV 7 DAYS 5-10 MIN: CPT | Performed by: INTERNAL MEDICINE

## 2020-05-01 ENCOUNTER — HOSPITAL (OUTPATIENT)
Dept: OTHER | Age: 70
End: 2020-05-01

## 2020-05-06 ENCOUNTER — OFFICE VISIT (OUTPATIENT)
Dept: OCCUPATIONAL MEDICINE | Facility: HOSPITAL | Age: 70
End: 2020-05-06
Attending: SURGERY
Payer: MEDICARE

## 2020-05-06 PROCEDURE — 97140 MANUAL THERAPY 1/> REGIONS: CPT

## 2020-05-06 NOTE — PROGRESS NOTES
Progress Summary  Pt has attended 15/24 visits in Occupational Therapy. Pt has attended 3 visits since her 2/24 progress summary; sessions between 3/9 and today were deferred d/t public health emergency created by COVID-[place].      Subjective:   Pt reports sh to the best of her ability, h/e deferred use of recommended Farrow Wrap garments for her Right lower leg and appears to have had continued difficulty with correct application of her Circaid garments.  She has previously been encouraged to enlist assistance attached to [employer].  Today's session activities inclusive of: re-assessment of status; education in impact of reducing tx frequency to x2/week in 2 days in a row approach / explained to patient that progress could be limited d/t needed schedule; re-educ boggy, pitting with poor superficial tissue mobility. Dorsum of foot is densely boggy to boggy, pitting. Distal 1/2 of lower leg has medium pink coloration. Fair tissue hydration in lower leg. Positive Stemmer's sign. Goals:   1.  Pt will complete skin required:  Yes  Please co-sign or sign and return this letter via fax as soon as possible to [phone]. I certify the need for these services furnished under this plan of treatment and while under my care.     X_________________________________________ Circaid JuxtaLite lower leg garments, size Large      Assessment:   **As above      Goals:   1. Pt will complete skin hygiene/care on a daily basis. 6 sessions decongestive exercises ACHIEVED   2. Pt will be independent in decongestive exercises.  6 session

## 2020-05-08 ENCOUNTER — APPOINTMENT (OUTPATIENT)
Dept: OCCUPATIONAL MEDICINE | Facility: HOSPITAL | Age: 70
End: 2020-05-08
Attending: SURGERY
Payer: MEDICARE

## 2020-05-11 ENCOUNTER — OFFICE VISIT (OUTPATIENT)
Dept: OCCUPATIONAL MEDICINE | Facility: HOSPITAL | Age: 70
End: 2020-05-11
Attending: SURGERY
Payer: MEDICARE

## 2020-05-11 PROCEDURE — 97140 MANUAL THERAPY 1/> REGIONS: CPT

## 2020-05-11 NOTE — PROGRESS NOTES
Dx: Lymphedema (I89.0)           Authorized # of Visits:  16/24  (unlimited visits approved 5/6-8/4)      Next MD visit/plan renewal:  6/6/20  Fall Risk: HIGH          Precautions:  Left UE limb precautions; HIGH FALL RISK; cardiac; lymphedema; wound/infec mid-patella and over inferior surface. Proximal 1/2 of lower leg is densely boggy, pitting with poor superficial tissue mobility; distal 1/2 is densely boggy slightly pitting with poor superficial tissue mobility.  Ankle is densely boggy, pitting with poor leg garments, size Medium  *Right lower le-ply layer of size \"E\" Tensogrip bootie; stockinet liner, Farrow Wrap lower leg garment    Assessment:   Good response to session techniques.  Good cooperation between pt and  resulting in correct compr

## 2020-05-12 ENCOUNTER — OFFICE VISIT (OUTPATIENT)
Dept: OCCUPATIONAL MEDICINE | Facility: HOSPITAL | Age: 70
End: 2020-05-12
Attending: SURGERY
Payer: MEDICARE

## 2020-05-12 PROCEDURE — 97140 MANUAL THERAPY 1/> REGIONS: CPT

## 2020-05-12 NOTE — PROGRESS NOTES
Dx: Lymphedema (I89.0)           Authorized # of Visits:  17/24  (unlimited visits approved 5/6-8/4)      Next MD visit/plan renewal:  6/6/20  Fall Risk: HIGH          Precautions:  Left UE limb precautions; HIGH FALL RISK; cardiac; lymphedema; wound/infec Positive Stemmer's sign. MEASUREMENTS:  None taken   TREATMENT:  *Removed compression and completed skin hygiene/care for bilateral lower legs. *Discussed pt's success with use of Farrow Wrap garments over Right lower leg.    *Bilateral lower quadrant m sessions  6. Pt will be independent in use of compression garments, self-manual lymph drainage, decongestive exercises and lymphedema precautions for life-long self-management of lymphedema. 8 sessions     Plan:   Continue current plan.       Charges: 4 Man

## 2020-05-13 ENCOUNTER — APPOINTMENT (OUTPATIENT)
Dept: OCCUPATIONAL MEDICINE | Facility: HOSPITAL | Age: 70
End: 2020-05-13
Attending: SURGERY
Payer: MEDICARE

## 2020-05-15 ENCOUNTER — APPOINTMENT (OUTPATIENT)
Dept: OCCUPATIONAL MEDICINE | Facility: HOSPITAL | Age: 70
End: 2020-05-15
Attending: SURGERY
Payer: MEDICARE

## 2020-05-16 DIAGNOSIS — I69.351 HEMIPARESIS AFFECTING RIGHT SIDE AS LATE EFFECT OF STROKE (HCC): ICD-10-CM

## 2020-05-16 DIAGNOSIS — E11.319 DIABETIC RETINOPATHY OF BOTH EYES ASSOCIATED WITH TYPE 2 DIABETES MELLITUS, MACULAR EDEMA PRESENCE UNSPECIFIED, UNSPECIFIED RETINOPATHY SEVERITY (HCC): Chronic | ICD-10-CM

## 2020-05-16 DIAGNOSIS — E78.5 HYPERLIPIDEMIA LDL GOAL <100: Chronic | ICD-10-CM

## 2020-05-16 DIAGNOSIS — Z79.4 TYPE 2 DIABETES MELLITUS WITH DIABETIC NEUROPATHY, WITH LONG-TERM CURRENT USE OF INSULIN (HCC): ICD-10-CM

## 2020-05-16 DIAGNOSIS — M81.6 LOCALIZED OSTEOPOROSIS WITHOUT CURRENT PATHOLOGICAL FRACTURE: ICD-10-CM

## 2020-05-16 DIAGNOSIS — E55.9 VITAMIN D DEFICIENCY: ICD-10-CM

## 2020-05-16 DIAGNOSIS — D32.9 BENIGN MENINGIOMA (HCC): ICD-10-CM

## 2020-05-16 DIAGNOSIS — I63.9 BRAINSTEM INFARCTION (HCC): ICD-10-CM

## 2020-05-16 DIAGNOSIS — I25.10 CAD, MULTIPLE VESSEL: Chronic | ICD-10-CM

## 2020-05-16 DIAGNOSIS — C50.312 MALIGNANT NEOPLASM OF LOWER-INNER QUADRANT OF LEFT FEMALE BREAST, UNSPECIFIED ESTROGEN RECEPTOR STATUS (HCC): Chronic | ICD-10-CM

## 2020-05-16 DIAGNOSIS — E11.40 TYPE 2 DIABETES MELLITUS WITH DIABETIC NEUROPATHY, WITH LONG-TERM CURRENT USE OF INSULIN (HCC): ICD-10-CM

## 2020-05-16 DIAGNOSIS — Z23 NEED FOR IMMUNIZATION AGAINST INFLUENZA: ICD-10-CM

## 2020-05-16 DIAGNOSIS — I67.2 CEREBRAL ATHEROSCLEROSIS: ICD-10-CM

## 2020-05-16 DIAGNOSIS — E87.1 HYPONATREMIA: ICD-10-CM

## 2020-05-18 ENCOUNTER — OFFICE VISIT (OUTPATIENT)
Dept: OCCUPATIONAL MEDICINE | Facility: HOSPITAL | Age: 70
End: 2020-05-18
Attending: SURGERY
Payer: MEDICARE

## 2020-05-18 PROCEDURE — 97140 MANUAL THERAPY 1/> REGIONS: CPT

## 2020-05-18 RX ORDER — CLOPIDOGREL BISULFATE 75 MG/1
TABLET ORAL
Qty: 90 TABLET | Refills: 3 | Status: SHIPPED | OUTPATIENT
Start: 2020-05-18 | End: 2020-01-01

## 2020-05-18 NOTE — PROGRESS NOTES
Dx: Lymphedema (I89.0)           Authorized # of Visits:  18/24  (unlimited visits approved 5/6-8/4)      Next MD visit/plan renewal:  6/6/20  Fall Risk: HIGH          Precautions:  Left UE limb precautions; HIGH FALL RISK; cardiac; lymphedema; wound/infec boggy, pitting with poor superficial tissue mobility; distal 1/2 is densely boggy, pitting with poor superficial tissue mobility. Ankle is densely boggy, pitting with poor superficial tissue mobility. Dorsum of foot is densely boggy to boggy, pitting.  Dist Wrap lower leg garment    Assessment:   Pt with good progress towards reduction of volume in bilateral lower legs as well as improvement in tissue quality. Pt is inconsistent in use of Farrow Wrap garment over Right lower leg.  Shape/size of Left foot appro

## 2020-05-19 ENCOUNTER — OFFICE VISIT (OUTPATIENT)
Dept: OCCUPATIONAL MEDICINE | Facility: HOSPITAL | Age: 70
End: 2020-05-19
Attending: SURGERY
Payer: MEDICARE

## 2020-05-19 PROCEDURE — 97140 MANUAL THERAPY 1/> REGIONS: CPT

## 2020-05-19 NOTE — PROGRESS NOTES
Dx: Lymphedema (I89.0)           Authorized # of Visits:  19/24  (unlimited visits approved 5/6-8/4)      Next MD visit/plan renewal:  6/6/20  Fall Risk: HIGH          Precautions:  Left UE limb precautions; HIGH FALL RISK; cardiac; lymphedema; wound/infec coloration. Fair tissue hydration in lower leg. Positive Stemmer's sign. MEASUREMENTS:  None taken   TREATMENT:  *Removed compression from bilateral lower legs. *Bilateral lower quadrant manual lymph drainage with extended focus on lower legs.  Observed

## 2020-05-20 ENCOUNTER — APPOINTMENT (OUTPATIENT)
Dept: OCCUPATIONAL MEDICINE | Facility: HOSPITAL | Age: 70
End: 2020-05-20
Attending: SURGERY
Payer: MEDICARE

## 2020-05-21 ENCOUNTER — LAB ENCOUNTER (OUTPATIENT)
Dept: LAB | Facility: HOSPITAL | Age: 70
End: 2020-05-21
Attending: NURSE PRACTITIONER
Payer: MEDICARE

## 2020-05-21 ENCOUNTER — VIRTUAL PHONE E/M (OUTPATIENT)
Dept: FAMILY MEDICINE CLINIC | Facility: CLINIC | Age: 70
End: 2020-05-21
Payer: MEDICARE

## 2020-05-21 DIAGNOSIS — Z20.822 SUSPECTED COVID-19 VIRUS INFECTION: Primary | ICD-10-CM

## 2020-05-21 DIAGNOSIS — R05.9 COUGH: ICD-10-CM

## 2020-05-21 DIAGNOSIS — Z20.822 CLOSE EXPOSURE TO COVID-19 VIRUS: ICD-10-CM

## 2020-05-21 DIAGNOSIS — R43.9 SENSE OF SMELL ALTERED: ICD-10-CM

## 2020-05-21 DIAGNOSIS — Z20.822 SUSPECTED COVID-19 VIRUS INFECTION: ICD-10-CM

## 2020-05-21 PROCEDURE — 99441 PHONE E/M BY PHYS 5-10 MIN: CPT | Performed by: NURSE PRACTITIONER

## 2020-05-21 NOTE — PROGRESS NOTES
Virtual Check-In    Clarence Ham verbally consents to a [employer] on 05/21/20. Patient understands and accepts financial responsibility for any deductible, co-insurance and/or co-pays associated with this service.     Duration of th Future    Close exposure to COVID-19 virus- as above  -     SARS-COV-2 BY PCR; Future

## 2020-05-22 ENCOUNTER — TELEPHONE (OUTPATIENT)
Dept: PHYSICAL THERAPY | Facility: HOSPITAL | Age: 70
End: 2020-05-22

## 2020-05-22 ENCOUNTER — APPOINTMENT (OUTPATIENT)
Dept: OCCUPATIONAL MEDICINE | Facility: HOSPITAL | Age: 70
End: 2020-05-22
Attending: SURGERY
Payer: MEDICARE

## 2020-05-23 ENCOUNTER — VIRTUAL PHONE E/M (OUTPATIENT)
Dept: FAMILY MEDICINE CLINIC | Facility: CLINIC | Age: 70
End: 2020-05-23
Payer: MEDICARE

## 2020-05-23 DIAGNOSIS — U07.1 COVID-19 VIRUS INFECTION: Primary | ICD-10-CM

## 2020-05-23 PROCEDURE — 99441 PHONE E/M BY PHYS 5-10 MIN: CPT | Performed by: NURSE PRACTITIONER

## 2020-05-23 NOTE — PROGRESS NOTES
Virtual Check-In    Michael Goldman verbally consents to a [employer] on 05/23/20. Patient understands and accepts financial responsibility for any deductible, co-insurance and/or co-pays associated with this service.     Duration of th Patient verbalized understanding and is agreeable to this plan of care.

## 2020-05-26 ENCOUNTER — APPOINTMENT (OUTPATIENT)
Dept: OCCUPATIONAL MEDICINE | Facility: HOSPITAL | Age: 70
End: 2020-05-26
Attending: SURGERY
Payer: MEDICARE

## 2020-05-26 ENCOUNTER — VIRTUAL PHONE E/M (OUTPATIENT)
Dept: FAMILY MEDICINE CLINIC | Facility: CLINIC | Age: 70
End: 2020-05-26
Payer: MEDICARE

## 2020-05-26 ENCOUNTER — TELEPHONE (OUTPATIENT)
Dept: OCCUPATIONAL MEDICINE | Facility: HOSPITAL | Age: 70
End: 2020-05-26

## 2020-05-26 ENCOUNTER — TELEPHONE (OUTPATIENT)
Dept: FAMILY MEDICINE CLINIC | Facility: CLINIC | Age: 70
End: 2020-05-26

## 2020-05-26 ENCOUNTER — PATIENT OUTREACH (OUTPATIENT)
Dept: CASE MANAGEMENT | Age: 70
End: 2020-05-26

## 2020-05-26 DIAGNOSIS — R43.9 SENSE OF SMELL ALTERED: ICD-10-CM

## 2020-05-26 DIAGNOSIS — U07.1 COVID-19 VIRUS INFECTION: Primary | ICD-10-CM

## 2020-05-26 DIAGNOSIS — R53.83 FATIGUE, UNSPECIFIED TYPE: ICD-10-CM

## 2020-05-26 DIAGNOSIS — R05.9 COUGH: ICD-10-CM

## 2020-05-26 PROCEDURE — 99441 PHONE E/M BY PHYS 5-10 MIN: CPT | Performed by: NURSE PRACTITIONER

## 2020-05-26 NOTE — TELEPHONE ENCOUNTER
Message received that pt called and cancelled her next 2 weeks of appts as she tested positive for COVID-19.

## 2020-05-26 NOTE — PROGRESS NOTES
Virtual Check-In    Griceldaselvin Sexton verbally consents to a [employer] on 05/26/20. Patient understands and accepts financial responsibility for any deductible, co-insurance and/or co-pays associated with this service.     Duration of th as above    Sense of smell altered- as above

## 2020-05-26 NOTE — TELEPHONE ENCOUNTER
Patient scheduled for MA Supervisit 06/01/20, per Dr. Hutchinson Pillar can schedule for Doximity or reschedule for a different day. Patient is Covid positive.  Called patient and left message on machine to contact office and let us know what she wants to do

## 2020-05-27 ENCOUNTER — PATIENT OUTREACH (OUTPATIENT)
Dept: CASE MANAGEMENT | Age: 70
End: 2020-05-27

## 2020-05-27 NOTE — PROGRESS NOTES
Home Monitoring Condition Update    Covid19+ test date: 5/21/2020      Consent Verification:  Assessment Completed With: Patient  HIPAA Verified?   Yes    COVID-19 HOME MONITORING 5/27/2020   Temperature 98   Reading From Forehead   Pulse 97   Pulse taken tolerated and rest as needed due to fatigue and weakness. Encouraged good nutrition/adequate hydration. Discussed care companion program-pt sts not interested. Informed NCM will call for update again tomorrow.  Patient voices understanding/agrees with plan/

## 2020-05-28 ENCOUNTER — TELEPHONE (OUTPATIENT)
Dept: CASE MANAGEMENT | Age: 70
End: 2020-05-28

## 2020-05-28 ENCOUNTER — VIRTUAL PHONE E/M (OUTPATIENT)
Dept: FAMILY MEDICINE CLINIC | Facility: CLINIC | Age: 70
End: 2020-05-28
Payer: MEDICARE

## 2020-05-28 DIAGNOSIS — U07.1 COVID-19 VIRUS INFECTION: Primary | ICD-10-CM

## 2020-05-28 PROCEDURE — 99441 PHONE E/M BY PHYS 5-10 MIN: CPT | Performed by: NURSE PRACTITIONER

## 2020-05-28 NOTE — PROGRESS NOTES
Virtual Check-In    Eugene Law verbally consents to a [employer] on 05/28/20. Patient understands and accepts financial responsibility for any deductible, co-insurance and/or co-pays associated with this service.     Duration of th increasing dizziness or ANY other concerning symptoms.  Patient is in high risk category related to age and comorbidities and threshold for presentation to ER is low. Patient verbalized understanding and is agreeable to this plan of care

## 2020-05-28 NOTE — TELEPHONE ENCOUNTER
virtual visit notes from Robin ARELLANO 5/28/2020 reviewed. **please confirm if pt to be removed for Home Monitoring program? If not, please advise frequency and duration for continued monitoring-thanks!     PLEASE REPLY TO [facility] HOME MONITORING POOL

## 2020-06-01 ENCOUNTER — VIRTUAL PHONE E/M (OUTPATIENT)
Dept: FAMILY MEDICINE CLINIC | Facility: CLINIC | Age: 70
End: 2020-06-01
Payer: MEDICARE

## 2020-06-01 ENCOUNTER — HOSPITAL (OUTPATIENT)
Dept: OTHER | Age: 70
End: 2020-06-01

## 2020-06-01 ENCOUNTER — APPOINTMENT (OUTPATIENT)
Dept: OCCUPATIONAL MEDICINE | Facility: HOSPITAL | Age: 70
End: 2020-06-01
Attending: SURGERY
Payer: MEDICARE

## 2020-06-01 DIAGNOSIS — U07.1 COVID-19 VIRUS INFECTION: Primary | ICD-10-CM

## 2020-06-01 PROCEDURE — 99441 PHONE E/M BY PHYS 5-10 MIN: CPT | Performed by: NURSE PRACTITIONER

## 2020-06-01 NOTE — PROGRESS NOTES
Virtual Check-In    Ishmael Krabbe verbally consents to a [employer] on 06/01/20. Patient understands and accepts financial responsibility for any deductible, co-insurance and/or co-pays associated with this service.     Duration of th

## 2020-06-02 ENCOUNTER — APPOINTMENT (OUTPATIENT)
Dept: OCCUPATIONAL MEDICINE | Facility: HOSPITAL | Age: 70
End: 2020-06-02
Attending: SURGERY
Payer: MEDICARE

## 2020-06-04 ENCOUNTER — VIRTUAL PHONE E/M (OUTPATIENT)
Dept: FAMILY MEDICINE CLINIC | Facility: CLINIC | Age: 70
End: 2020-06-04
Payer: MEDICARE

## 2020-06-04 DIAGNOSIS — R53.83 FATIGUE, UNSPECIFIED TYPE: ICD-10-CM

## 2020-06-04 DIAGNOSIS — U07.1 COVID-19 VIRUS INFECTION: Primary | ICD-10-CM

## 2020-06-04 PROCEDURE — 99442 PHONE E/M BY PHYS 11-20 MIN: CPT | Performed by: NURSE PRACTITIONER

## 2020-06-04 NOTE — PROGRESS NOTES
Virtual Check-In    Amadou Navas verbally consents to a [employer] on 06/04/20. Patient understands and accepts financial responsibility for any deductible, co-insurance and/or co-pays associated with this service.     Duration of th outcomes associated with COVID-19 infection including diabetes, HTN, CAD, obesity, age>65. instructed she should have very low thresh hold to go to ER for evaluation and also discussed this with her  (with her permission).  Both patient and  v

## 2020-06-06 ENCOUNTER — MOBILE ENCOUNTER (OUTPATIENT)
Dept: FAMILY MEDICINE CLINIC | Facility: CLINIC | Age: 70
End: 2020-06-06

## 2020-06-06 NOTE — PROGRESS NOTES
Spoke to patient, stated she is feeling somewhat improved today. Stated still has SOB with activity but feels it is a little better now. Stated remained with fatigue and poor appetite. Reports diarrhea is improving some. She is eating and drinking.  Lyn Arnold

## 2020-06-08 ENCOUNTER — VIRTUAL PHONE E/M (OUTPATIENT)
Dept: FAMILY MEDICINE CLINIC | Facility: CLINIC | Age: 70
End: 2020-06-08
Payer: MEDICARE

## 2020-06-08 ENCOUNTER — APPOINTMENT (OUTPATIENT)
Dept: OCCUPATIONAL MEDICINE | Facility: HOSPITAL | Age: 70
End: 2020-06-08
Attending: SURGERY
Payer: MEDICARE

## 2020-06-08 DIAGNOSIS — E11.40 TYPE 2 DIABETES MELLITUS WITH DIABETIC NEUROPATHY, WITH LONG-TERM CURRENT USE OF INSULIN (HCC): ICD-10-CM

## 2020-06-08 DIAGNOSIS — U07.1 COVID-19 VIRUS INFECTION: Primary | ICD-10-CM

## 2020-06-08 DIAGNOSIS — Z79.4 TYPE 2 DIABETES MELLITUS WITH DIABETIC NEUROPATHY, WITH LONG-TERM CURRENT USE OF INSULIN (HCC): ICD-10-CM

## 2020-06-08 PROCEDURE — 99441 PHONE E/M BY PHYS 5-10 MIN: CPT | Performed by: NURSE PRACTITIONER

## 2020-06-08 NOTE — PROGRESS NOTES
Virtual Check-In    Niranjan Lawson verbally consents to a [employer] on 06/08/20. Patient understands and accepts financial responsibility for any deductible, co-insurance and/or co-pays associated with this service.     Duration of th COVID infection and it is unlikely that metformin is now cause of her diarrhea. Should continue metformin and other diabetes medications, check glucoses regularly if diet is dimininshed. Notify office if glucoses uncontrolled.    Patient verbalized Karina Castanon

## 2020-06-09 ENCOUNTER — APPOINTMENT (OUTPATIENT)
Dept: OCCUPATIONAL MEDICINE | Facility: HOSPITAL | Age: 70
End: 2020-06-09
Attending: SURGERY
Payer: MEDICARE

## 2020-06-11 ENCOUNTER — VIRTUAL PHONE E/M (OUTPATIENT)
Dept: FAMILY MEDICINE CLINIC | Facility: CLINIC | Age: 70
End: 2020-06-11
Payer: MEDICARE

## 2020-06-11 DIAGNOSIS — U07.1 COVID-19 VIRUS INFECTION: Primary | ICD-10-CM

## 2020-06-11 DIAGNOSIS — R06.02 SOB (SHORTNESS OF BREATH): ICD-10-CM

## 2020-06-11 DIAGNOSIS — R53.83 FATIGUE, UNSPECIFIED TYPE: ICD-10-CM

## 2020-06-11 PROCEDURE — 99441 PHONE E/M BY PHYS 5-10 MIN: CPT | Performed by: NURSE PRACTITIONER

## 2020-06-11 NOTE — PROGRESS NOTES
Virtual Check-In    Angela Muro verbally consents to a [employer] on 06/11/20. Patient understands and accepts financial responsibility for any deductible, co-insurance and/or co-pays associated with this service.     Duration of th as above      Patient verbalized understanding and is agreeable to this plan of care.

## 2020-06-15 ENCOUNTER — TELEPHONE (OUTPATIENT)
Dept: FAMILY MEDICINE CLINIC | Facility: CLINIC | Age: 70
End: 2020-06-15

## 2020-06-15 ENCOUNTER — APPOINTMENT (OUTPATIENT)
Dept: OCCUPATIONAL MEDICINE | Facility: HOSPITAL | Age: 70
End: 2020-06-15
Attending: FAMILY MEDICINE
Payer: MEDICARE

## 2020-06-15 ENCOUNTER — TELEPHONE (OUTPATIENT)
Dept: OCCUPATIONAL MEDICINE | Facility: HOSPITAL | Age: 70
End: 2020-06-15

## 2020-06-15 ENCOUNTER — VIRTUAL PHONE E/M (OUTPATIENT)
Dept: FAMILY MEDICINE CLINIC | Facility: CLINIC | Age: 70
End: 2020-06-15
Payer: MEDICARE

## 2020-06-15 DIAGNOSIS — R53.83 FATIGUE, UNSPECIFIED TYPE: ICD-10-CM

## 2020-06-15 DIAGNOSIS — R06.02 SOB (SHORTNESS OF BREATH): ICD-10-CM

## 2020-06-15 DIAGNOSIS — U07.1 COVID-19 VIRUS INFECTION: Primary | ICD-10-CM

## 2020-06-15 DIAGNOSIS — S91.301A WOUND OF RIGHT FOOT: ICD-10-CM

## 2020-06-15 PROCEDURE — 99441 PHONE E/M BY PHYS 5-10 MIN: CPT | Performed by: NURSE PRACTITIONER

## 2020-06-15 NOTE — PROGRESS NOTES
Virtual Check-In    Amadou Navas verbally consents to a [employer] on 06/15/20. Patient understands and accepts financial responsibility for any deductible, co-insurance and/or co-pays associated with this service.     Duration of th possible. Continue inhaler every 4 hours as needed for SOB. Will do another phone follow up on 6/18.  Reiterated ER warnings for worse SOB, SOB at rest, dizziness, lightheadedness, lethargy, chest pains or other concerning symptoms. Patient verbalized under

## 2020-06-15 NOTE — TELEPHONE ENCOUNTER
Returned pt's phone message as requested. Pt stating she recently developed \"slight blister\" on her Right foot that she thought would go away. Blister became larger, then opened up.  She then developed a second blister on her Right lower leg which is \"

## 2020-06-15 NOTE — TELEPHONE ENCOUNTER
Pt requesting a referral for the Wound Clinic as she has blister on R leg that is big oozing.  Please call when done or if appt is needed

## 2020-06-15 NOTE — TELEPHONE ENCOUNTER
Message received that pt called and cancelled today's session as she has to go to the Wound Care clinic.

## 2020-06-16 ENCOUNTER — APPOINTMENT (OUTPATIENT)
Dept: OCCUPATIONAL MEDICINE | Facility: HOSPITAL | Age: 70
End: 2020-06-16
Attending: FAMILY MEDICINE
Payer: MEDICARE

## 2020-06-16 NOTE — TELEPHONE ENCOUNTER
Patient needs a new referral to the Wound Clinic, was advised to ask for an additional 10 visits, next appointment with them is Tuesday April 23. Patient

## 2020-06-18 ENCOUNTER — VIRTUAL PHONE E/M (OUTPATIENT)
Dept: FAMILY MEDICINE CLINIC | Facility: CLINIC | Age: 70
End: 2020-06-18
Payer: MEDICARE

## 2020-06-18 ENCOUNTER — OFFICE VISIT (OUTPATIENT)
Dept: WOUND CARE | Facility: HOSPITAL | Age: 70
End: 2020-06-18
Attending: FAMILY MEDICINE
Payer: MEDICARE

## 2020-06-18 DIAGNOSIS — I87.312 CHRONIC VENOUS HYPERTENSION (IDIOPATHIC) WITH ULCER OF LEFT LOWER EXTREMITY (HCC): Primary | ICD-10-CM

## 2020-06-18 DIAGNOSIS — Z79.4 TYPE 2 DIABETES MELLITUS WITH DIABETIC NEUROPATHY, WITH LONG-TERM CURRENT USE OF INSULIN (HCC): ICD-10-CM

## 2020-06-18 DIAGNOSIS — R53.83 FATIGUE, UNSPECIFIED TYPE: ICD-10-CM

## 2020-06-18 DIAGNOSIS — I73.9 PERIPHERAL VASCULAR DISEASE, UNSPECIFIED (HCC): ICD-10-CM

## 2020-06-18 DIAGNOSIS — U07.1 COVID-19 VIRUS INFECTION: Primary | ICD-10-CM

## 2020-06-18 DIAGNOSIS — S91.301A WOUND OF RIGHT FOOT: ICD-10-CM

## 2020-06-18 DIAGNOSIS — I10 ESSENTIAL HYPERTENSION: ICD-10-CM

## 2020-06-18 DIAGNOSIS — R06.02 SOB (SHORTNESS OF BREATH): ICD-10-CM

## 2020-06-18 DIAGNOSIS — L97.929 CHRONIC VENOUS HYPERTENSION (IDIOPATHIC) WITH ULCER OF LEFT LOWER EXTREMITY (HCC): Primary | ICD-10-CM

## 2020-06-18 DIAGNOSIS — E11.40 TYPE 2 DIABETES MELLITUS WITH DIABETIC NEUROPATHY, WITH LONG-TERM CURRENT USE OF INSULIN (HCC): ICD-10-CM

## 2020-06-18 PROCEDURE — 99442 PHONE E/M BY PHYS 11-20 MIN: CPT | Performed by: NURSE PRACTITIONER

## 2020-06-18 PROCEDURE — 99215 OFFICE O/P EST HI 40 MIN: CPT

## 2020-06-18 PROCEDURE — 29581 APPL MULTLAYER CMPRN SYS LEG: CPT

## 2020-06-18 NOTE — PROGRESS NOTES
Virtual Check-In    Kunal Shore verbally consents to a [employer] on 06/18/20. Patient understands and accepts financial responsibility for any deductible, co-insurance and/or co-pays associated with this service.     Duration of th Psych: pleasant and cooperative on phone, speech pattern normal    Plan:   Diagnoses and all orders for this visit:    COVID-19 virus infection- notes some gradual improvement in symptoms save for fatigue.  Discussed, again, given her age and chronic il

## 2020-06-18 NOTE — PROGRESS NOTES
Print   x Close    Header Image    Progress Note Details  Patient Name: Jacob Bejarano   Patient Number: [id number]  Patient YOB: 1950  Date: 6/18/2020  Physician / Yusuf Winters: Malvin Perez: [id number] [facility] History    Social History  This information was obtained from the patient  Never smoker, Alcohol Use - none, Marital Status - , Occupation - Retured, Lives in - house, Lives with - spouse    Medical History  This information was obtained from the pa Headaches  Endocrine  Hematologic/Lymphatic  Allergic/Immunologic  Psychiatric: Anxiety, Depression  Prior Wound History: Bleeding, Drainage, Erythema, Malodor, Pain    Additional Information  Medication reconciliation completed at today's visit. : Yes  Hi skin does not exhibit signs or symptoms of infection. Local Pulse is Doppler. Wound #20 Right, Lateral Lower Leg is an Unclassifiable Lymphedema and has received an outcome of Resolved.   The periwound skin texture is normal. The periwound skin moisture Correctly: No  Calf Measurement 30 cm with right measurement of 46.5 cm  Ankle Measurement 11 cm with right measurement of 30.3 cm  Vascular Assessment:  Left Extremity Pulses:  Dorsalis Pedis: Doppler  Right Extremity Pulses:  Dorsalis Pedis: Doppler  [facility]    Follow-Up Appointments:  Return Appointment in 1 week. -  [facility]  RN visit for assessment of wound(s).  - MONDAY 6/22/20            Entered By: Manuel Jeff on 06/18/2020 16:29:49  Signature(s): Date(s):

## 2020-06-22 ENCOUNTER — OFFICE VISIT (OUTPATIENT)
Dept: WOUND CARE | Facility: HOSPITAL | Age: 70
End: 2020-06-22
Attending: FAMILY MEDICINE
Payer: MEDICARE

## 2020-06-22 DIAGNOSIS — L97.929 CHRONIC VENOUS HYPERTENSION (IDIOPATHIC) WITH ULCER OF LEFT LOWER EXTREMITY (HCC): Primary | ICD-10-CM

## 2020-06-22 DIAGNOSIS — S81.801A OPEN WOUND OF BOTH LOWER EXTREMITIES WITH COMPLICATION, INITIAL ENCOUNTER: ICD-10-CM

## 2020-06-22 DIAGNOSIS — I87.312 CHRONIC VENOUS HYPERTENSION (IDIOPATHIC) WITH ULCER OF LEFT LOWER EXTREMITY (HCC): Primary | ICD-10-CM

## 2020-06-22 DIAGNOSIS — I73.9 PERIPHERAL VASCULAR DISEASE, UNSPECIFIED (HCC): ICD-10-CM

## 2020-06-22 DIAGNOSIS — S81.802A OPEN WOUND OF BOTH LOWER EXTREMITIES WITH COMPLICATION, INITIAL ENCOUNTER: ICD-10-CM

## 2020-06-22 PROCEDURE — 29581 APPL MULTLAYER CMPRN SYS LEG: CPT

## 2020-06-22 PROCEDURE — 82962 GLUCOSE BLOOD TEST: CPT

## 2020-06-23 ENCOUNTER — TELEPHONE (OUTPATIENT)
Dept: NEUROLOGY | Facility: CLINIC | Age: 70
End: 2020-06-23

## 2020-06-24 ENCOUNTER — VIRTUAL PHONE E/M (OUTPATIENT)
Dept: FAMILY MEDICINE CLINIC | Facility: CLINIC | Age: 70
End: 2020-06-24
Payer: MEDICARE

## 2020-06-24 DIAGNOSIS — R06.02 SOB (SHORTNESS OF BREATH): ICD-10-CM

## 2020-06-24 DIAGNOSIS — U07.1 COVID-19 VIRUS INFECTION: Primary | ICD-10-CM

## 2020-06-24 DIAGNOSIS — R25.2 LEG CRAMPING: ICD-10-CM

## 2020-06-24 DIAGNOSIS — R53.83 FATIGUE, UNSPECIFIED TYPE: ICD-10-CM

## 2020-06-24 DIAGNOSIS — S91.301A WOUND OF RIGHT FOOT: ICD-10-CM

## 2020-06-24 PROCEDURE — 99441 PHONE E/M BY PHYS 5-10 MIN: CPT | Performed by: NURSE PRACTITIONER

## 2020-06-24 NOTE — PROGRESS NOTES
Virtual Check-In    Savi Celestiner verbally consents to a [employer] on 06/24/20. Patient understands and accepts financial responsibility for any deductible, co-insurance and/or co-pays associated with this service.     Duration of improvement. Instructed to continue to rest as needed and not push herself too much around the house but to be as active as possible. Continue inhaler every 4 hours as needed for SOB.  Will do another phone follow up on 6/29. Reiterated ER warnings for wors

## 2020-06-25 ENCOUNTER — OFFICE VISIT (OUTPATIENT)
Dept: WOUND CARE | Facility: HOSPITAL | Age: 70
End: 2020-06-25
Attending: FAMILY MEDICINE
Payer: MEDICARE

## 2020-06-25 ENCOUNTER — TELEPHONE (OUTPATIENT)
Dept: FAMILY MEDICINE CLINIC | Facility: CLINIC | Age: 70
End: 2020-06-25

## 2020-06-25 DIAGNOSIS — I87.312 CHRONIC VENOUS HYPERTENSION (IDIOPATHIC) WITH ULCER OF LEFT LOWER EXTREMITY (HCC): Primary | ICD-10-CM

## 2020-06-25 DIAGNOSIS — L97.929 CHRONIC VENOUS HYPERTENSION (IDIOPATHIC) WITH ULCER OF LEFT LOWER EXTREMITY (HCC): Primary | ICD-10-CM

## 2020-06-25 DIAGNOSIS — I73.9 PERIPHERAL VASCULAR DISEASE, UNSPECIFIED (HCC): ICD-10-CM

## 2020-06-25 PROCEDURE — 82962 GLUCOSE BLOOD TEST: CPT

## 2020-06-25 PROCEDURE — 29581 APPL MULTLAYER CMPRN SYS LEG: CPT

## 2020-06-25 NOTE — PROGRESS NOTES
Print   x Close    Header Image    CurrentOld Version  Modified Version:  Progress Note Details  Patient Name: Benita Ledesma   Patient Number: [id number]  Patient YOB: 1950  Date: 6/25/2020   / Malik Gone: Joey Bauer Assistive Devices (cane)  Integumentary (Hair/Skin/Nails): Prone to Skin Tears (Plavix ), Dryness (generalized), Hemosiderin Staining (BLE)  Neurological: Loss of Protective Sensation (Pt stated has neuropathy), One-sided weakness (Hemiparesis)  Prior [employer] Edema, Hemosiderosis. The temperature of the periwound skin is WNL. Periwound skin does not exhibit signs or symptoms of infection. Local Pulse is Weak.     Vitals  Height/Length: 64 in (162.56 cm), Weight: 175 lbs (79.55 kgs), BMI: 30, Temperature: 97.2 °F (primary) hypertension        Procedures    Wound #19  Wound #19 (Lymphedema) is located on the right, anterior lower leg. A Multi-Layer Compression Wrap procedure was performed. A Multi-Layer was applied with moderate 15-25 mmhg.  The procedure was tolerat

## 2020-06-25 NOTE — TELEPHONE ENCOUNTER
Hi Dr. Rufina Frank saw Kana Costello today in the wound clinic for a RLE wound.  I wanted to let you know that her blood pressure on a manual cuff was measuring 182/100, however she was perfectly asymptomatic and states she did physical exertion with gardening

## 2020-06-26 ENCOUNTER — VIRTUAL PHONE E/M (OUTPATIENT)
Dept: FAMILY MEDICINE CLINIC | Facility: CLINIC | Age: 70
End: 2020-06-26
Payer: MEDICARE

## 2020-06-26 DIAGNOSIS — I10 ESSENTIAL HYPERTENSION: Primary | ICD-10-CM

## 2020-06-26 PROCEDURE — 99213 OFFICE O/P EST LOW 20 MIN: CPT | Performed by: NURSE PRACTITIONER

## 2020-06-29 ENCOUNTER — VIRTUAL PHONE E/M (OUTPATIENT)
Dept: FAMILY MEDICINE CLINIC | Facility: CLINIC | Age: 70
End: 2020-06-29
Payer: MEDICARE

## 2020-06-29 DIAGNOSIS — R06.02 SOB (SHORTNESS OF BREATH): ICD-10-CM

## 2020-06-29 DIAGNOSIS — U07.1 COVID-19 VIRUS INFECTION: Primary | ICD-10-CM

## 2020-06-29 DIAGNOSIS — R53.83 FATIGUE, UNSPECIFIED TYPE: ICD-10-CM

## 2020-06-29 DIAGNOSIS — S91.301A WOUND OF RIGHT FOOT: ICD-10-CM

## 2020-06-29 DIAGNOSIS — I10 ESSENTIAL HYPERTENSION: ICD-10-CM

## 2020-06-29 PROCEDURE — 99442 PHONE E/M BY PHYS 11-20 MIN: CPT | Performed by: NURSE PRACTITIONER

## 2020-06-29 RX ORDER — CARVEDILOL 12.5 MG/1
12.5 TABLET ORAL 2 TIMES DAILY WITH MEALS
Qty: 60 TABLET | Refills: 0 | Status: SHIPPED | OUTPATIENT
Start: 2020-06-29 | End: 2020-06-29

## 2020-06-29 RX ORDER — CARVEDILOL 12.5 MG/1
12.5 TABLET ORAL 2 TIMES DAILY WITH MEALS
Qty: 180 TABLET | Refills: 1 | Status: SHIPPED | OUTPATIENT
Start: 2020-06-29 | End: 2021-01-01

## 2020-06-29 RX ORDER — CARVEDILOL 12.5 MG/1
TABLET ORAL
Qty: 180 TABLET | Refills: 0 | Status: SHIPPED | OUTPATIENT
Start: 2020-06-29 | End: 2020-09-10

## 2020-06-29 NOTE — PROGRESS NOTES
Virtual Check-In    Alberto Pendleton verbally consents to a [employer] on 06/29/20. Patient understands and accepts financial responsibility for any deductible, co-insurance and/or co-pays associated with this service.     Duration of th breaths or coughing noted during conversation. Psych: pleasant and cooperative on phone, speech pattern normal    Plan:   Diagnoses and all orders for this visit:    COVID-19 virus infection- continues with expected slow improvement.  Instructed to con

## 2020-06-29 NOTE — TELEPHONE ENCOUNTER
Requested Prescriptions     Pending Prescriptions Disp Refills   • CARVEDILOL 12.5 MG Oral Tab [Pharmacy Med Name: CARVEDILOL 12.5MG TABLETS] 180 tablet 0     Sig: TAKE 1 TABLET(12.5 MG) BY MOUTH TWICE DAILY WITH MEALS     LOV 2/17/2020     Patient was ask

## 2020-07-01 ENCOUNTER — VIRTUAL PHONE E/M (OUTPATIENT)
Dept: FAMILY MEDICINE CLINIC | Facility: CLINIC | Age: 70
End: 2020-07-01
Payer: MEDICARE

## 2020-07-01 ENCOUNTER — HOSPITAL (OUTPATIENT)
Dept: OTHER | Age: 70
End: 2020-07-01
Attending: INTERNAL MEDICINE

## 2020-07-01 DIAGNOSIS — I10 ESSENTIAL HYPERTENSION: ICD-10-CM

## 2020-07-01 DIAGNOSIS — U07.1 COVID-19 VIRUS INFECTION: Primary | ICD-10-CM

## 2020-07-01 DIAGNOSIS — S91.301A WOUND OF RIGHT FOOT: ICD-10-CM

## 2020-07-01 PROCEDURE — 99441 PHONE E/M BY PHYS 5-10 MIN: CPT | Performed by: NURSE PRACTITIONER

## 2020-07-01 NOTE — PROGRESS NOTES
Virtual Check-In    Huseyin Da Silva verbally consents to a [employer] on 07/01/20. Patient understands and accepts financial responsibility for any deductible, co-insurance and/or co-pays associated with this service.     Duration of th expected slow improvement. Instructed to continue to rest as needed and not push herself too much around the house but to be as active as possible. Continue inhaler every 4 hours as needed for SOB.  Will do another phone follow up on 7/8 and if continuing t

## 2020-07-02 ENCOUNTER — OFFICE VISIT (OUTPATIENT)
Dept: WOUND CARE | Facility: HOSPITAL | Age: 70
End: 2020-07-02
Attending: FAMILY MEDICINE
Payer: MEDICARE

## 2020-07-02 DIAGNOSIS — S81.801A OPEN WOUND OF BOTH LOWER EXTREMITIES WITH COMPLICATION, INITIAL ENCOUNTER: ICD-10-CM

## 2020-07-02 DIAGNOSIS — I87.312 CHRONIC VENOUS HYPERTENSION (IDIOPATHIC) WITH ULCER OF LEFT LOWER EXTREMITY (HCC): Primary | ICD-10-CM

## 2020-07-02 DIAGNOSIS — I73.9 PERIPHERAL VASCULAR DISEASE, UNSPECIFIED (HCC): ICD-10-CM

## 2020-07-02 DIAGNOSIS — L97.929 CHRONIC VENOUS HYPERTENSION (IDIOPATHIC) WITH ULCER OF LEFT LOWER EXTREMITY (HCC): Primary | ICD-10-CM

## 2020-07-02 DIAGNOSIS — S81.802A OPEN WOUND OF BOTH LOWER EXTREMITIES WITH COMPLICATION, INITIAL ENCOUNTER: ICD-10-CM

## 2020-07-02 LAB — GLUCOSE BLD-MCNC: 137 MG/DL (ref 70–99)

## 2020-07-02 PROCEDURE — 82962 GLUCOSE BLOOD TEST: CPT

## 2020-07-02 PROCEDURE — 29581 APPL MULTLAYER CMPRN SYS LEG: CPT

## 2020-07-07 ENCOUNTER — OFFICE VISIT (OUTPATIENT)
Dept: WOUND CARE | Facility: HOSPITAL | Age: 70
End: 2020-07-07
Attending: FAMILY MEDICINE
Payer: MEDICARE

## 2020-07-07 DIAGNOSIS — I73.9 PERIPHERAL VASCULAR DISEASE, UNSPECIFIED (HCC): ICD-10-CM

## 2020-07-07 DIAGNOSIS — I87.312 CHRONIC VENOUS HYPERTENSION (IDIOPATHIC) WITH ULCER OF LEFT LOWER EXTREMITY (HCC): Primary | ICD-10-CM

## 2020-07-07 DIAGNOSIS — L97.929 CHRONIC VENOUS HYPERTENSION (IDIOPATHIC) WITH ULCER OF LEFT LOWER EXTREMITY (HCC): Primary | ICD-10-CM

## 2020-07-07 PROCEDURE — 29581 APPL MULTLAYER CMPRN SYS LEG: CPT

## 2020-07-07 PROCEDURE — 82962 GLUCOSE BLOOD TEST: CPT

## 2020-07-08 ENCOUNTER — TELEPHONE (OUTPATIENT)
Dept: FAMILY MEDICINE CLINIC | Facility: CLINIC | Age: 70
End: 2020-07-08

## 2020-07-08 ENCOUNTER — VIRTUAL PHONE E/M (OUTPATIENT)
Dept: FAMILY MEDICINE CLINIC | Facility: CLINIC | Age: 70
End: 2020-07-08

## 2020-07-08 DIAGNOSIS — U07.1 COVID-19 VIRUS INFECTION: Primary | ICD-10-CM

## 2020-07-08 LAB — GLUCOSE BLD-MCNC: 136 MG/DL (ref 70–99)

## 2020-07-08 PROCEDURE — 99213 OFFICE O/P EST LOW 20 MIN: CPT | Performed by: NURSE PRACTITIONER

## 2020-07-08 NOTE — PROGRESS NOTES
Called patient twice today for follow up of COVID. No answer either time. Will attempt to reach patient again tomorrow.

## 2020-07-08 NOTE — TELEPHONE ENCOUNTER
Well I did not call her today but I did call her back she was just outside and got hot but otherwise doing well, denies difficulty breathing or any other problem.

## 2020-07-08 NOTE — TELEPHONE ENCOUNTER
Pt calling Madeleine Lanierob back, she believes he was just calling to check on her due to the fact she had Covid. Please call patient back she will stay by the phone.

## 2020-07-09 ENCOUNTER — APPOINTMENT (OUTPATIENT)
Dept: WOUND CARE | Facility: HOSPITAL | Age: 70
End: 2020-07-09
Attending: FAMILY MEDICINE
Payer: MEDICARE

## 2020-07-09 NOTE — PROGRESS NOTES
Virtual Check-In    Geetha Paoc verbally consents to a [employer] on 07/09/20. Patient understands and accepts financial responsibility for any deductible, co-insurance and/or co-pays associated with this service.     Duration of th

## 2020-07-09 NOTE — PROGRESS NOTES
Print   x Close    Header Image    Progress Note Details  Patient Name: Mansoor Oneill   Patient Number: [id number]  Patient YOB: 1950  Date: 7/7/2020  Physician / Kusum Dang: Sarah Police: [address] Sensation (Pt stated has neuropathy), One-sided weakness (Hemiparesis)  Prior Wound History: Other (H/o BLE ulcers in the past)    Patient denies complaints or symptoms related to:  Constitutional Symptoms (General Health)  Eyes  Ear/Nose/Mouth/Throat  Res signs or symptoms of infection. Local Pulse is Weak.     Vitals  Height/Length: 64 in (162.56 cm), Weight: 175 lbs (79.55 kgs), BMI: 30, Temperature: 97.8 °F (36.56 °C), Pulse: 78 bpm, Respiratory Rate: 18 breaths/min, Blood Pressure: 144/79 mmHg, Capillary Assessment:  Left Extremity:  Calf Measurement 30 cm from heel  Ankle Measurement 11 cm from heel  Right Extremity: Edema is present  Compression Device In Use: Yes  Device Used Correctly: Yes  Compression Device Used: Unna's or Duke Boot  Calf Measurement

## 2020-07-14 ENCOUNTER — OFFICE VISIT (OUTPATIENT)
Dept: WOUND CARE | Facility: HOSPITAL | Age: 70
End: 2020-07-14
Attending: FAMILY MEDICINE
Payer: MEDICARE

## 2020-07-14 DIAGNOSIS — I73.9 PERIPHERAL VASCULAR DISEASE, UNSPECIFIED (HCC): ICD-10-CM

## 2020-07-14 DIAGNOSIS — I87.312 CHRONIC VENOUS HYPERTENSION (IDIOPATHIC) WITH ULCER OF LEFT LOWER EXTREMITY (HCC): Primary | ICD-10-CM

## 2020-07-14 DIAGNOSIS — L97.929 CHRONIC VENOUS HYPERTENSION (IDIOPATHIC) WITH ULCER OF LEFT LOWER EXTREMITY (HCC): Primary | ICD-10-CM

## 2020-07-14 LAB — GLUCOSE BLD-MCNC: 111 MG/DL (ref 70–99)

## 2020-07-14 PROCEDURE — 82962 GLUCOSE BLOOD TEST: CPT

## 2020-07-14 PROCEDURE — 99214 OFFICE O/P EST MOD 30 MIN: CPT

## 2020-07-15 ENCOUNTER — TELEPHONE (OUTPATIENT)
Dept: FAMILY MEDICINE CLINIC | Facility: CLINIC | Age: 70
End: 2020-07-15

## 2020-07-15 NOTE — PROGRESS NOTES
Print   x Close    Header Image    Progress Note Details  Patient Name: Florida Lopez   Patient Number: [id number]  Patient YOB: 1950  Date: 7/14/2020  Physician / Jonathan Milian: Shiloh Thorne: [facility] ), Dryness (generalized), Hemosiderin Staining (BLE)  Neurological: Loss of Protective Sensation (Pt stated has neuropathy), One-sided weakness (Hemiparesis)  Prior Wound History: Other (H/o BLE ulcers in the past)    Patient denies complaints or symptoms periwound skin is WNL. Periwound skin does not exhibit signs or symptoms of infection. Local Pulse is Weak.     Vitals  Height/Length: 64 in (162.56 cm), Weight: 175 lbs (79.55 kgs), BMI: 30, (36.56 °C), Pulse: 69 bpm, Respiratory Rate: 16 breaths/min, Bloo cm  Vascular Assessment:  Right Extremity Pulses:  Dorsalis Pedis: Palpable          Assessment    Active Problems    ICD-10  (Encounter Diagnosis) S81.801D - Unspecified open wound, right lower leg, subsequent encounter  (Encounter Diagnosis) I89.0 - Lymp

## 2020-07-15 NOTE — TELEPHONE ENCOUNTER
Catherine Vela says she was discharged from [facility] last week by [facility]. Friday she had 2 episodes of tightness in her L lower chest both lasting 5 minutes along with coughing and difficulty breathing.  She had one episode of tightness in the same area to

## 2020-07-15 NOTE — TELEPHONE ENCOUNTER
Pt calling she was Covid and has been released and discharged from the wound clinic and she has been reading up on the Covid and she has been having chest tightness and it was bad on Friday and she is worried that the virus has caused something else mejia

## 2020-07-16 NOTE — TELEPHONE ENCOUNTER
Agree with Dr. Richard Palmer. Should return to her cardiologist, Dr. Gil Miller, please place referral if needed. ER warnings for worsening symptoms.

## 2020-07-21 ENCOUNTER — TELEPHONE (OUTPATIENT)
Dept: FAMILY MEDICINE CLINIC | Facility: CLINIC | Age: 70
End: 2020-07-21

## 2020-08-01 ENCOUNTER — HOSPITAL (OUTPATIENT)
Dept: OTHER | Age: 70
End: 2020-08-01
Attending: INTERNAL MEDICINE

## 2020-08-10 ENCOUNTER — TELEPHONE (OUTPATIENT)
Dept: PHYSICAL THERAPY | Age: 70
End: 2020-08-10

## 2020-08-11 ENCOUNTER — OFFICE VISIT (OUTPATIENT)
Dept: OCCUPATIONAL MEDICINE | Facility: HOSPITAL | Age: 70
End: 2020-08-11
Attending: Other
Payer: MEDICARE

## 2020-08-11 PROCEDURE — 97166 OT EVAL MOD COMPLEX 45 MIN: CPT

## 2020-08-11 PROCEDURE — 97140 MANUAL THERAPY 1/> REGIONS: CPT

## 2020-08-11 NOTE — PROGRESS NOTES
LE LYMPHEDEMA EVALUATION:   Referring Physician: Dr. Belinda Delvalle  Diagnosis: Lymphedema (I89.0)         Date of Service: 8/11/2020     PATIENT SUMMARY   Daria Bae is a 72year old female who returns to therapy for assistance in the management o further impacts her ability to navigate the stairs to get into the shower on the second floor of her home.    Stephanie Pam describes prior level of function as modified independent in ADLs with use of quad cane, railings in bathroom / shower; modified independ necessary for  Complete Decongestive Therapy to reduce swelling and decrease risk of infection, garment prescription and education/self management.  Her access to necessary frequency to attend therapy is limited d/t her need to rely on her  for trans garments to her next session.      Charges: Occupational Therapy Eval: Moderate Complexity, Manual Therapy x1      Total Timed Treatment: 15 min     Total Treatment Time: 60 min     Based on clinical rationale and outcome measures, this evaluation involved required: Yes  I certify the need for these services furnished under this plan of treatment and while under my care.     X___________________________________________________ Date____________________    Certification From: 6/33/3722  To:11/9/2020        JUANPABLO NAVARRO

## 2020-08-17 ENCOUNTER — OFFICE VISIT (OUTPATIENT)
Dept: OCCUPATIONAL MEDICINE | Facility: HOSPITAL | Age: 70
End: 2020-08-17
Attending: Other
Payer: MEDICARE

## 2020-08-17 PROCEDURE — 97140 MANUAL THERAPY 1/> REGIONS: CPT

## 2020-08-17 NOTE — PROGRESS NOTES
Dx: Lymphedema (I89.0)         Insurance (Authorized # of Visits): 2/16     Next MD/Plan Renewal Date: 11/9/2020    Authorizing Physician: Dr. Jnen Nj: HIGH        Precautions:  Left UE limb precautions; HIGH FALL RISK; cardiac; lymphedema; wo habits, especially in the setting of diabetes. *Proposed use of longer length of Tensogrip sleeves over bilateral lower legs. Pt in agreement. *Donned compression over bilateral lower legs using Farrow Wrap garment over Right lower leg.   *Requested pt

## 2020-08-18 ENCOUNTER — APPOINTMENT (OUTPATIENT)
Dept: OCCUPATIONAL MEDICINE | Facility: HOSPITAL | Age: 70
End: 2020-08-18
Attending: Other
Payer: MEDICARE

## 2020-08-20 DIAGNOSIS — E11.40 TYPE 2 DIABETES MELLITUS WITH DIABETIC NEUROPATHY, WITH LONG-TERM CURRENT USE OF INSULIN (HCC): ICD-10-CM

## 2020-08-20 DIAGNOSIS — Z79.4 TYPE 2 DIABETES MELLITUS WITH DIABETIC NEUROPATHY, WITH LONG-TERM CURRENT USE OF INSULIN (HCC): ICD-10-CM

## 2020-08-21 RX ORDER — GLIMEPIRIDE 4 MG/1
4 TABLET ORAL
Qty: 90 TABLET | Refills: 0 | Status: SHIPPED | OUTPATIENT
Start: 2020-08-21 | End: 2020-01-01

## 2020-08-21 NOTE — PATIENT INSTRUCTIONS
End of shift bedside report given to DARRION Velásquez. Patient in no apparent distress.     12 hour chart check complete.   Future Appointments  Date Time Provider Shiela Roy   9/13/2017 9:45 AM Flaco Garcia MD [facility]   11/16/2017 3:00 PM Shea Adhikari DPM [facility] NP BEAU Rodriguez   3/19/2018 9:10 AM Treasure Valdez  Ne Glenis Pleitez

## 2020-08-25 ENCOUNTER — OFFICE VISIT (OUTPATIENT)
Dept: OCCUPATIONAL MEDICINE | Facility: HOSPITAL | Age: 70
End: 2020-08-25
Attending: Other
Payer: MEDICARE

## 2020-08-25 PROCEDURE — 97140 MANUAL THERAPY 1/> REGIONS: CPT

## 2020-08-25 NOTE — PROGRESS NOTES
Dx: Lymphedema (I89.0)         Insurance (Authorized # of Visits): 3/16 (6 insurance approved visits)    Next MD/Plan Renewal Date: 11/9/2020    Authorizing Physician: Dr. Layne Coast: HIGH        Precautions:  Left UE limb precautions; HIGH FALL tissue mobilization of dense areas of tissue in lower leg. Sloughing of dead skin from Right lower leg, h/e not to extent as at last session. Observed improved superficial tissue mobility in lower leg by end of session.    *Skin hygiene/care for bilateral l

## 2020-08-26 ENCOUNTER — TELEPHONE (OUTPATIENT)
Dept: FAMILY MEDICINE CLINIC | Facility: CLINIC | Age: 70
End: 2020-08-26

## 2020-08-26 DIAGNOSIS — C50.312 CARCINOMA OF LOWER-INNER QUADRANT OF BREAST, LEFT (HCC): ICD-10-CM

## 2020-08-26 DIAGNOSIS — E11.40 TYPE 2 DIABETES MELLITUS WITH DIABETIC NEUROPATHY, UNSPECIFIED WHETHER LONG TERM INSULIN USE (HCC): Primary | ICD-10-CM

## 2020-08-26 DIAGNOSIS — R26.89 BALANCE DISORDER: ICD-10-CM

## 2020-08-26 DIAGNOSIS — R26.9 GAIT ABNORMALITY: ICD-10-CM

## 2020-08-26 NOTE — TELEPHONE ENCOUNTER
Patient called stated she has an appointment scheduled for 10/26/2020 to see Dr. Hari Ibarra and referral is needed.  Patient is also requesting referrals for physical therapy (pt stated she only has referral for occupational therapy) and referral to see onco

## 2020-08-27 NOTE — TELEPHONE ENCOUNTER
Referral request Dr. Adair Hart M.D.,Ophthalmology    DX: L26.23    5 visits    Auth number per [facility] [id number] valid 8/27/2020 to 1/24/2021

## 2020-08-27 NOTE — TELEPHONE ENCOUNTER
Patient notified of referral to Dr. Enrico Clark M.D., Dr Tremaine Watkins M.D. and physical therapy.

## 2020-08-27 NOTE — TELEPHONE ENCOUNTER
Referral request Physical therapy    Evaluate and treat   8 visits    Last PT 2/10/2020  Office notes from last PT  . Plan: Continue plan of care as pt tolerates with focus on strengthening, endurance and balance to improve activities of daily living .

## 2020-08-27 NOTE — TELEPHONE ENCOUNTER
TC from patient requesting referral to Dr. Hood Ibrahim M.D. for post breast cancer follow-up, Dr. Patricia Wasserman M.D. for diabetic eye exam follow-up. She is also requesting a referral for PT for balance and gait problems.    Is it okay to place all

## 2020-08-27 NOTE — TELEPHONE ENCOUNTER
Referral request Dr. Art Lainez M.D., Hematology, Oncology    DX: P83.635    5 visits    Auth per [facility] [id number] valid 8/27/2020 to 1/24/2021

## 2020-08-31 ENCOUNTER — OFFICE VISIT (OUTPATIENT)
Dept: OCCUPATIONAL MEDICINE | Facility: HOSPITAL | Age: 70
End: 2020-08-31
Attending: Other
Payer: MEDICARE

## 2020-08-31 PROCEDURE — 97140 MANUAL THERAPY 1/> REGIONS: CPT

## 2020-08-31 NOTE — PROGRESS NOTES
Dx: Lymphedema (I89.0)         Insurance (Authorized # of Visits): 4/16 (6 insurance approved visits)    Next MD/Plan Renewal Date: 11/9/2020    Authorizing Physician: Dr. Nora Arango: HIGH        Precautions:  Left UE limb precautions; HIGH FALL foot and toes. *Skin hygiene/care for bilateral lower legs. Pt again having soiled feet. Pt admitting she is not wearing shoes in the home setting.  Re-educated pt in need for good daily skin care habits and use of shoes, especially in setting of diabetes

## 2020-09-01 ENCOUNTER — HOSPITAL (OUTPATIENT)
Dept: OTHER | Age: 70
End: 2020-09-01
Attending: INTERNAL MEDICINE

## 2020-09-01 ENCOUNTER — TELEPHONE (OUTPATIENT)
Dept: FAMILY MEDICINE CLINIC | Facility: CLINIC | Age: 70
End: 2020-09-01

## 2020-09-01 ENCOUNTER — OFFICE VISIT (OUTPATIENT)
Dept: OCCUPATIONAL MEDICINE | Facility: HOSPITAL | Age: 70
End: 2020-09-01
Attending: Other
Payer: MEDICARE

## 2020-09-01 DIAGNOSIS — E11.40 CONTROLLED TYPE 2 DIABETES WITH NEUROPATHY (HCC): Primary | ICD-10-CM

## 2020-09-01 DIAGNOSIS — B35.1 ONYCHOMYCOSIS: ICD-10-CM

## 2020-09-01 PROCEDURE — 97140 MANUAL THERAPY 1/> REGIONS: CPT

## 2020-09-01 NOTE — PROGRESS NOTES
Dx: Lymphedema (I89.0)         Insurance (Authorized # of Visits): 5/16 (6 insurance approved visits)    Next MD/Plan Renewal Date: 11/9/2020    Authorizing Physician: Dr. Susanne Xiao: HIGH        Precautions:  Left UE limb precautions; HIGH FALL reduced by 9.9cm since initial eval.  *Right LE volume has reduced by 24.8cm. TREATMENT:  *Volume re-measurement   *Bilateral lower quadrant manual lymph drainage with gentle soft tissue mobilization of dense areas of tissue in lower legs.  No sloughing o

## 2020-09-01 NOTE — TELEPHONE ENCOUNTER
Referral request Dr. Mendy Soria M.D.,Endocrinologist    5 visits    Auth per [facility] [id number] valid 9/1/2020 to 1/29/2021    Diagnosis: E11.40

## 2020-09-01 NOTE — TELEPHONE ENCOUNTER
Patient left message that referrals are approved for Dr. Denisse Lewis M.D. and Dr. Hodan Obrien DPM

## 2020-09-01 NOTE — TELEPHONE ENCOUNTER
Referral request Dr. Hilario Ashby, DPM    5 visits    Diagnosis E11.40  B35.1    Auth per [facility] [id number] valid 9/1/2020 to 1/29/2021

## 2020-09-08 ENCOUNTER — APPOINTMENT (OUTPATIENT)
Dept: OCCUPATIONAL MEDICINE | Facility: HOSPITAL | Age: 70
End: 2020-09-08
Attending: Other
Payer: MEDICARE

## 2020-09-08 ENCOUNTER — TELEPHONE (OUTPATIENT)
Dept: PHYSICAL THERAPY | Age: 70
End: 2020-09-08

## 2020-09-10 RX ORDER — CARVEDILOL 12.5 MG/1
TABLET ORAL
Qty: 180 TABLET | Refills: 0 | Status: SHIPPED | OUTPATIENT
Start: 2020-09-10

## 2020-09-10 NOTE — TELEPHONE ENCOUNTER
Refill request for:    Requested Prescriptions     Pending Prescriptions Disp Refills   • CARVEDILOL 12.5 MG Oral Tab [Pharmacy Med Name: CARVEDILOL 12.5MG TABLETS] 180 tablet 0     Sig: TAKE 1 TABLET(12.5 MG) BY MOUTH TWICE DAILY WITH MEALS        Last Pr

## 2020-09-11 ENCOUNTER — TELEPHONE (OUTPATIENT)
Dept: FAMILY MEDICINE CLINIC | Facility: CLINIC | Age: 70
End: 2020-09-11

## 2020-09-11 NOTE — TELEPHONE ENCOUNTER
Okay to hold the Plavix 5 days before the procedure, I contacted Dr. Amna Hoffman and she is good with this.   She can started up the day after the procedure

## 2020-09-11 NOTE — TELEPHONE ENCOUNTER
Deannie Libman from Dentist Dr Juanita Dunn, pt needs deep cleaning, and after that appt then on a later date she will need  6-7 extractions.   Pt is on Plavix and when the do the deep cleaning she will bleed and they are worried with her CLOPIDOGREL BISULFATE 75 MG

## 2020-09-11 NOTE — TELEPHONE ENCOUNTER
I am writing a message to Dr. Keagan Carlson who is been managing the coronary artery disease.   She had a cath in March but no recent stenting so she may be able to come off for a few days but I will ask Dr. Keagan Carlson herself

## 2020-09-14 ENCOUNTER — APPOINTMENT (OUTPATIENT)
Dept: OCCUPATIONAL MEDICINE | Facility: HOSPITAL | Age: 70
End: 2020-09-14
Attending: Other
Payer: MEDICARE

## 2020-09-14 ENCOUNTER — TELEPHONE (OUTPATIENT)
Dept: NEUROLOGY | Facility: CLINIC | Age: 70
End: 2020-09-14

## 2020-09-14 PROCEDURE — 97140 MANUAL THERAPY 1/> REGIONS: CPT

## 2020-09-14 NOTE — TELEPHONE ENCOUNTER
Dentist office called, they had been calling the wrong office; they are req a letter sent stating the pt can stop taking Plavix for extensive dental procedure next week; pls fax to [phone].

## 2020-09-14 NOTE — TELEPHONE ENCOUNTER
Rerceived call from Dr. Bernardino Forbes office requesting letter for patient to be off plavix for dental procedure. Per [facility] review, Dr. Carmen Donaldson gave clearance on 9/11/2020. Hold Plavix 5 days prior to procedure and resume day after procedure.     Pended letter for

## 2020-09-14 NOTE — PROGRESS NOTES
Dx: Lymphedema (I89.0)         Insurance (Authorized # of Visits):6/16 (6 insurance approved visits)    Next MD/Plan Renewal Date: 11/9/2020    Authorizing Physician: Dr. Harish Cao: HIGH        Precautions:  Left UE limb precautions; HIGH FALL R boggy, slightly pitting with fair superficial tissue mobility; distal surface has pale pink coloration.  Proximal 1/2 of Right lower leg is boggy,pitting; distal 1/2 is densely boggy, slightly pitting with fair superficial tissue mobility; dark medium pink bilateral lower leg lymphedema density to boggy to slightly boggy to reduce infection/wound risk.     6. Pt will be independent in use of compression garments, self-manual lymph drainage, decongestive exercises and lymphedema precautions for life-long self-

## 2020-09-14 NOTE — TELEPHONE ENCOUNTER
[facility] Dentist, Elizabeth Mahmood, has been trying to contact us regarding a letter to stop taking Clopidogrel for dental procedures next Monday and Tuesday; pls call Dentist office at [phone]

## 2020-09-15 ENCOUNTER — TELEPHONE (OUTPATIENT)
Dept: FAMILY MEDICINE CLINIC | Facility: CLINIC | Age: 70
End: 2020-09-15

## 2020-09-15 DIAGNOSIS — Z91.81 AT RISK FOR FALLING: ICD-10-CM

## 2020-09-15 DIAGNOSIS — I89.0 LYMPHEDEMA: Primary | ICD-10-CM

## 2020-09-15 DIAGNOSIS — R53.1 WEAKNESS: ICD-10-CM

## 2020-09-15 NOTE — TELEPHONE ENCOUNTER
Ofe Simental notified. She requested this in writing to below fax number. Faxed as requested.       Anand Scott  Fax [phone]

## 2020-09-15 NOTE — TELEPHONE ENCOUNTER
TC from patient today asking for another referral for OT for her lymphedema. Also patient had a recent fall and is asking for PT for her risk of falling.    See office notes from [facility], PT 9/1/2020  Subjective:   *Pt reports she had a fall to the fl

## 2020-09-16 NOTE — TELEPHONE ENCOUNTER
Referral request OT    Diagnosis: Lymphedema    Visit notes from Lisa Weathers 9/14/2020  Goals: (to be met in 16 visits)  1. Pt will complete skin hygiene/care on a daily basis. ACHIEVED    2. Pt will be independent in decongestive exercises.     3. P

## 2020-09-16 NOTE — TELEPHONE ENCOUNTER
Left message for patient that PT and OT have been ordered at [facility] for her. If any questions to return my call.

## 2020-09-16 NOTE — TELEPHONE ENCOUNTER
Referral request PT    Evaluate and Treat  8 Visits  Patient seen by Kane Puentes OT 9/1/2020  Subjective:   *Pt reports she had a fall to the floor last night. Fell onto her buttocks, Right knee. Denies LOC, injury.  Family needed to call paramedics to he

## 2020-09-17 ENCOUNTER — TELEPHONE (OUTPATIENT)
Dept: PHYSICAL THERAPY | Facility: HOSPITAL | Age: 70
End: 2020-09-17

## 2020-09-21 ENCOUNTER — APPOINTMENT (OUTPATIENT)
Dept: OCCUPATIONAL MEDICINE | Facility: HOSPITAL | Age: 70
End: 2020-09-21
Attending: Other
Payer: MEDICARE

## 2020-09-22 ENCOUNTER — APPOINTMENT (OUTPATIENT)
Dept: OCCUPATIONAL MEDICINE | Facility: HOSPITAL | Age: 70
End: 2020-09-22
Attending: Other
Payer: MEDICARE

## 2020-09-28 ENCOUNTER — OFFICE VISIT (OUTPATIENT)
Dept: OCCUPATIONAL MEDICINE | Facility: HOSPITAL | Age: 70
End: 2020-09-28
Attending: Other
Payer: MEDICARE

## 2020-09-28 PROCEDURE — 97140 MANUAL THERAPY 1/> REGIONS: CPT

## 2020-09-28 NOTE — PROGRESS NOTES
Progress Summary  Pt has attended 7/16 visits in Occupational Therapy . Subjective:   Pt reports she obtained her new diabetic shoes last week.  Circaid compression garments arrived, h/e she has not yet tried them; preferred that therapist assist her Velcro-closure garments was too loose. EDEMA/TISSUE QUALITY:   *By 9/1, pt's Left LE volume had reduced by 9.9cm and her Right LE volume had reduced by 24.8cm.  Today, Left LE volume has returned to her baseline with her Right LE now only 7.1cm smaller options and has agreed to actively participate in planning and for this course of care. Thank you for your referral. If you have any questions, please contact me at Dept: [phone]. Sincerely,  Electronically signed by therapist: Haylee Hanson.  Sung, JuxtaLite AFW, size Small; Circaid JuxtaFit Essentials lower leg garment, size Medium, Long    Assessment:   **As above     Goals: (to be met in 16 visits)  1. Pt will complete skin hygiene/care on a daily basis. ACHIEVED    2.  Pt will be independent in de

## 2020-09-29 ENCOUNTER — OFFICE VISIT (OUTPATIENT)
Dept: OCCUPATIONAL MEDICINE | Facility: HOSPITAL | Age: 70
End: 2020-09-29
Attending: Other
Payer: MEDICARE

## 2020-09-29 PROCEDURE — 97140 MANUAL THERAPY 1/> REGIONS: CPT

## 2020-09-29 PROCEDURE — 97535 SELF CARE MNGMENT TRAINING: CPT

## 2020-09-29 NOTE — PROGRESS NOTES
Dx: Lymphedema (I89.0)         Insurance (Authorized # of Visits) 8/16 (2/6 insurance approved visits)    Next MD/Plan Renewal Date: 11/9/2020    Authorizing Physician: Dr. Ijeoma Doll: HIGH        Precautions:  Left UE limb precautions; HIGH FALL donning of ankle/foot garment. Pt able to replicate over Left and Right ankles/feet with cueing and re-demonstration to assure appropriate amount of strap tension. Provided step-by-step demonstration in donning of lower leg garment.  Pt able to replicate ov ACHIEVED     4. Reduce bilateral LE lymphedema volume by 20-50cm to improve ease during LE dressing and allow pt to fit into shoes. 5. Reduce bilateral lower leg lymphedema density to boggy to slightly boggy to reduce infection/wound risk.     6. Pt will b

## 2020-10-06 ENCOUNTER — OFFICE VISIT (OUTPATIENT)
Dept: OCCUPATIONAL MEDICINE | Facility: HOSPITAL | Age: 70
End: 2020-10-06
Attending: Other
Payer: MEDICARE

## 2020-10-06 PROCEDURE — 97140 MANUAL THERAPY 1/> REGIONS: CPT

## 2020-10-07 NOTE — PROGRESS NOTES
Dx: Lymphedema (I89.0)         Insurance (Authorized # of Visits) 9/16 (3/6 insurance approved visits)    Next MD/Plan Renewal Date: 11/9/2020    Authorizing Physician: Dr. Hollie Danielson: HIGH        Precautions:  Left UE limb precautions; HIGH FALL 1/2. Ankles are densely boggy to boggy, pitting with fair superficial tissue mobility. Left foot is boggy, pitting with fair superficial tissue mobility. Right foot is densely boggy to boggy, pitting with fair superficial tissue mobility.  Fair tissue hydra self-management, and this is impacting her progress. Due to her limited overall mobility, she would benefit from assistance from her family for garment donning, h/e she appears resistant to asking them for assistance.      Goals: (to be met in 16 visits)  1

## 2020-10-09 LAB
ALBUMIN/GLOB SERPL: 1.2 (CALC) (ref 1–2.5)
ALBUMIN/GLOB SERPL: 1.3 (CALC) (ref 1–2.5)
ALBUMIN/GLOB SERPL: 1.3 (CALC) (ref 1–2.5)
ALBUMIN: 3.7 G/DL (ref 3.6–5.1)
ALBUMIN: 3.9 G/DL (ref 3.6–5.1)
ALBUMIN: 4 G/DL (ref 3.6–5.1)
ALKALINE PHOSPHATASE: 68 UNIT/L (ref 33–130)
ALKALINE PHOSPHATASE: 69 UNIT/L (ref 33–130)
ALKALINE PHOSPHATASE: 71 UNIT/L (ref 33–130)
ALT: 15 UNIT/L (ref 6–29)
ALT: 16 UNIT/L (ref 6–29)
ALT: 19 UNIT/L (ref 6–29)
AST: 11 UNIT/L (ref 10–35)
AST: 12 UNIT/L (ref 10–35)
AST: 13 UNIT/L (ref 10–35)
BASO%: 0.3 %
BASO%: 0.4 %
BASO%: 0.4 %
BASO: 0 10^3/UL
BILIRUBIN, TOTAL: 0.5 MG/DL (ref 0.2–1.2)
BILIRUBIN, TOTAL: 0.7 MG/DL (ref 0.2–1.2)
BILIRUBIN, TOTAL: 0.7 MG/DL (ref 0.2–1.2)
BUN/CREATININE RATIO: ABNORMAL (CALC) (ref 6–22)
CALCIUM: 8.4 MG/DL (ref 8.6–10.4)
CALCIUM: 8.8 MG/DL (ref 8.6–10.4)
CALCIUM: 9.3 MG/DL (ref 8.6–10.4)
CARBON DIOXIDE: 23 MMOL/L (ref 20–31)
CARBON DIOXIDE: 23 MMOL/L (ref 20–31)
CARBON DIOXIDE: 26 MMOL/L (ref 20–32)
CHLORIDE: 103 MMOL/L (ref 98–110)
CHLORIDE: 103 MMOL/L (ref 98–110)
CHLORIDE: 105 MMOL/L (ref 98–110)
CRCL (C&G) (MOSAIQ HL): 121.61 ML/MIN
CRCL (C&G) (MOSAIQ HL): 130.99 ML/MIN
CRCL (C&G) (MOSAIQ HL): 141.7 ML/MIN
CREATININE CLEARANCE (MOSAIQ HL): 105.3 ML/MIN
CREATININE CLEARANCE (MOSAIQ HL): 95 ML/MIN
CREATININE CLEARANCE (MOSAIQ HL): 95.4 ML/MIN
CREATININE: 0.53 MG/DL (ref 0.5–0.99)
CREATININE: 0.57 MG/DL (ref 0.5–0.99)
CREATININE: 0.58 MG/DL (ref 0.5–0.99)
EGFR AFRICAN AMERICAN: 111 ML/MIN/1.73M2
EGFR AFRICAN AMERICAN: 111 ML/MIN/1.73M2
EGFR AFRICAN AMERICAN: 115 ML/MIN/1.73M2
EGFR NON-AFR. AMERICAN: 100 ML/MIN/1.73M2
EGFR NON-AFR. AMERICAN: 96 ML/MIN/1.73M2
EGFR NON-AFR. AMERICAN: 96 ML/MIN/1.73M2
EOS%: 2.1 %
EOS%: 2.2 %
EOS%: 2.7 %
EOS: 0.1 10^3/UL
EOS: 0.1 10^3/UL
EOS: 0.2 10^3/UL
GLOBULIN: 3 G/DL (CALC) (ref 1.9–3.7)
GLOBULIN: 3 G/DL (CALC) (ref 1.9–3.7)
GLOBULIN: 3.1 G/DL (CALC) (ref 1.9–3.7)
GLUCOSE: 201 MG/DL (ref 65–99)
GLUCOSE: 231 MG/DL (ref 65–99)
GLUCOSE: 252 MG/DL (ref 65–99)
HCT: 35.9 % (ref 38–54)
HCT: 37.2 % (ref 38–54)
HCT: 37.9 % (ref 38–54)
HGB: 11.8 G/DL (ref 12–18)
HGB: 12.1 G/DL (ref 12–18)
HGB: 12.6 G/DL (ref 12–18)
LYMPH%: 21.4 % (ref 12–44)
LYMPH%: 21.9 % (ref 12–44)
LYMPH%: 22.4 % (ref 12–44)
LYMPH: 1.1 10^3/UL (ref 0.8–2.8)
LYMPH: 1.2 10^3/UL (ref 0.8–2.8)
LYMPH: 1.3 10^3/UL (ref 0.8–2.8)
MCH: 29.8 PG (ref 26–33)
MCH: 29.9 PG (ref 26–33)
MCH: 30.3 PG (ref 26–33)
MCHC: 32.5 G/DL (ref 31–36)
MCHC: 32.9 G/DL (ref 31–36)
MCHC: 33.2 G/DL (ref 31–36)
MCV: 89.8 FML (ref 82–100)
MCV: 91.6 FML (ref 82–100)
MCV: 92.1 FML (ref 82–100)
MONO%: 6.7 % (ref 2–12)
MONO%: 6.9 % (ref 2–12)
MONO%: 8.2 % (ref 2–12)
MONO: 0.3 10^3/UL (ref 0.2–1)
MONO: 0.4 10^3/UL (ref 0.2–1)
MONO: 0.5 10^3/UL (ref 0.2–1)
MPV: 8.8 FML (ref 8.6–11.7)
MPV: 9.7 FML (ref 8.6–11.7)
MPV: 9.8 FML (ref 8.6–11.7)
NEUT%: 67.3 % (ref 47–76)
NEUT%: 68.3 % (ref 47–76)
NEUT%: 68.8 % (ref 47–76)
NEUT: 3.4 10^3/UL (ref 1.5–7.1)
NEUT: 3.7 10^3/UL (ref 1.5–7.1)
NEUT: 4 10^3/UL (ref 1.5–7.1)
PLT: 188 10^3/UL (ref 150–375)
PLT: 196 10^3/UL (ref 150–375)
PLT: 202 10^3/UL (ref 150–375)
POTASSIUM: 3.9 MMOL/L (ref 3.5–5.3)
POTASSIUM: 4 MMOL/L (ref 3.5–5.3)
POTASSIUM: 4.1 MMOL/L (ref 3.5–5.3)
PROTEIN, TOTAL: 6.7 G/DL (ref 6.1–8.1)
PROTEIN, TOTAL: 6.9 G/DL (ref 6.1–8.1)
PROTEIN, TOTAL: 7.1 G/DL (ref 6.1–8.1)
RBC: 3.9 10^6/UL (ref 4.2–6.2)
RBC: 4.06 10^6/UL (ref 4.2–6.2)
RBC: 4.22 10^6/UL (ref 4.2–6.2)
RDW-CV: 12 %
RDW-CV: 12.8 %
RDW-CV: 12.9 %
RDW-SD: 38.8 FML (ref 36–50)
RDW-SD: 41.8 FML (ref 36–50)
RDW-SD: 42.8 FML (ref 36–50)
SODIUM: 137 MMOL/L (ref 135–146)
SODIUM: 139 MMOL/L (ref 135–146)
SODIUM: 139 MMOL/L (ref 135–146)
UREA NITROGEN (BUN): 15 MG/DL (ref 7–25)
UREA NITROGEN (BUN): 16 MG/DL (ref 7–25)
UREA NITROGEN (BUN): 17 MG/DL (ref 7–25)
WBC: 4.9 10^3/UL (ref 4.3–11)
WBC: 5.5 10^3/UL (ref 4.3–11)
WBC: 5.8 10^3/UL (ref 4.3–11)

## 2020-10-11 VITALS
SYSTOLIC BLOOD PRESSURE: 123 MMHG | WEIGHT: 178.99 LBS | DIASTOLIC BLOOD PRESSURE: 67 MMHG | BODY MASS INDEX: 30.56 KG/M2 | HEIGHT: 64 IN

## 2020-10-11 VITALS — SYSTOLIC BLOOD PRESSURE: 132 MMHG | WEIGHT: 192.99 LBS | DIASTOLIC BLOOD PRESSURE: 72 MMHG

## 2020-10-11 VITALS
WEIGHT: 184 LBS | DIASTOLIC BLOOD PRESSURE: 80 MMHG | BODY MASS INDEX: 30.66 KG/M2 | SYSTOLIC BLOOD PRESSURE: 146 MMHG | HEIGHT: 65 IN

## 2020-10-11 VITALS
BODY MASS INDEX: 31.16 KG/M2 | WEIGHT: 186.99 LBS | SYSTOLIC BLOOD PRESSURE: 124 MMHG | HEIGHT: 65 IN | DIASTOLIC BLOOD PRESSURE: 70 MMHG

## 2020-10-11 VITALS — WEIGHT: 178 LBS | SYSTOLIC BLOOD PRESSURE: 132 MMHG | DIASTOLIC BLOOD PRESSURE: 84 MMHG

## 2020-10-11 VITALS — SYSTOLIC BLOOD PRESSURE: 178 MMHG | DIASTOLIC BLOOD PRESSURE: 87 MMHG | WEIGHT: 191.01 LBS

## 2020-10-12 ENCOUNTER — APPOINTMENT (OUTPATIENT)
Dept: OCCUPATIONAL MEDICINE | Facility: HOSPITAL | Age: 70
End: 2020-10-12
Attending: Other
Payer: MEDICARE

## 2020-10-13 ENCOUNTER — OFFICE VISIT (OUTPATIENT)
Dept: OCCUPATIONAL MEDICINE | Facility: HOSPITAL | Age: 70
End: 2020-10-13
Attending: Other
Payer: MEDICARE

## 2020-10-13 PROCEDURE — 97140 MANUAL THERAPY 1/> REGIONS: CPT

## 2020-10-13 NOTE — PROGRESS NOTES
Progress Summary  Pt has attended 10/16 visits in Occupational Therapy. Subjective:   Pt reports daily use of Circaid garments over bilateral lower legs. Has now been allowing her daughter to assist her with donning her garments at times.  Reports is c legs \"a breather;\" following day did not apply her new garments as they still damp after laundering / put on older garments, h/e misapplied Tensogrip sleeves causing restriction over her proximal lower legs resulting in volume increase).  Since she was st tissue hydration. Today pt presented with a 5cm area of blistered tissue over her distal anterior Right lower leg; corresponds with area where garment straps interlock.  This area was covered with sterile pad prior to re-applying pt's garments at end of ses Date____________________    Certification From: 70/01/8543  To:1/11/2021      Objective:  *Arrived wearing Circaid lower leg and ankle/foot garments. *Arrived using quad cane.    OBSERVATION:  *5 cm area of blistered tissue over distal anterior Right lowe size Small; Circaid JuxtaFit Essentials lower leg garment, size Medium, Long    Assessment:   **As above     Goals: (to be met in 16 visits)  1. Pt will complete skin hygiene/care on a daily basis. ACHIEVED    2.  Pt will be independent in decongestive exer

## 2020-10-15 ENCOUNTER — TELEPHONE (OUTPATIENT)
Dept: CASE MANAGEMENT | Age: 70
End: 2020-10-15

## 2020-10-16 ENCOUNTER — TELEPHONE (OUTPATIENT)
Dept: NEUROLOGY | Facility: CLINIC | Age: 70
End: 2020-10-16

## 2020-10-19 ENCOUNTER — OFFICE VISIT (OUTPATIENT)
Dept: OCCUPATIONAL MEDICINE | Facility: HOSPITAL | Age: 70
End: 2020-10-19
Attending: Other
Payer: MEDICARE

## 2020-10-19 PROCEDURE — 97140 MANUAL THERAPY 1/> REGIONS: CPT

## 2020-10-19 NOTE — PROGRESS NOTES
Dx: Lymphedema (I89.0)         Insurance (Authorized # of Visits) 11/16 (1/3 insurance approved visits)    Next MD/Plan Renewal Date: 1/11/2021    Authorizing Physician: Dr. Rosalita Mohs: HIGH        Precautions:  Left UE limb precautions; HIGH FAL to/from treatment room. Reinforced with pt the use of her RW in the community setting for improved safety/balance and gait speed. *Bilateral lower quadrant manual lymph drainage with gentle soft tissue mobilization of dense areas of lymphedema.  Mild amoun lymph drainage, decongestive exercises and lymphedema precautions for life-long self-management of lymphedema. Plan:   Continue current plan.        Charges: 4 Manual Therapy    Total Timed Treatment: 55 min  Total Treatment Time: 60 min

## 2020-10-20 ENCOUNTER — OFFICE VISIT (OUTPATIENT)
Dept: OCCUPATIONAL MEDICINE | Facility: HOSPITAL | Age: 70
End: 2020-10-20
Attending: Other
Payer: MEDICARE

## 2020-10-20 PROCEDURE — 97140 MANUAL THERAPY 1/> REGIONS: CPT

## 2020-10-21 NOTE — PROGRESS NOTES
Dx: Lymphedema (I89.0)         Insurance (Authorized # of Visits) 12/16 (2/3 insurance approved visits)    Next MD/Plan Renewal Date: 1/11/2021    Authorizing Physician: Dr. Vang Hand: HIGH        Precautions:  Left UE limb precautions; HIGH FAL progress in regards to ankle/foot volume when returning to use of old garments without adequate coverage of ankles/feet. Recommended pt obtain second set of newer garments so that this can be avoided.  Pt concerned about financial burden in setting of costs min

## 2020-10-23 ENCOUNTER — TELEPHONE (OUTPATIENT)
Dept: FAMILY MEDICINE CLINIC | Facility: CLINIC | Age: 70
End: 2020-10-23

## 2020-10-23 RX ORDER — CHLORHEXIDINE GLUCONATE 0.12 MG/ML
RINSE ORAL
COMMUNITY
Start: 2020-09-21

## 2020-10-23 NOTE — TELEPHONE ENCOUNTER
Received call from [facility] at [address] [place] [address] checking if referral needed to be seen at center? States patient requesting to schedule, patient has Basilio Bass MA HMO.  Nurse at [address] [place] [address] states will have patient follow up with pcp

## 2020-10-23 NOTE — TELEPHONE ENCOUNTER
Received call back from Fernanda Youngblood and she confirmed that the referral for Dr Mallika Estrada would be used to see Dr Ana New because they are the same tax ID

## 2020-10-23 NOTE — TELEPHONE ENCOUNTER
Reviewed EPIC, pt has appointment to see Dr Stefan Duverney on 11/02/2020 for consult  It seems patient has an open referral to Dr Darrell Trinidad, Hem/Onc valid for 5 total visits through 01/24/2021  Will call Dr Catie Saul office to discuss,   [facility] for Zakiya in

## 2020-10-24 DIAGNOSIS — I63.9 BRAINSTEM INFARCTION (HCC): ICD-10-CM

## 2020-10-26 RX ORDER — ATORVASTATIN CALCIUM 40 MG/1
TABLET, FILM COATED ORAL
Qty: 90 TABLET | Refills: 3 | OUTPATIENT
Start: 2020-10-26

## 2020-10-26 NOTE — TELEPHONE ENCOUNTER
Request came from mail order pharmacy. Currently has #90 +refills at local pharmacy. Refused Rx at this time with note to pharmacy that if transfer is desired by pt, mail order pharmacy can contact local and provided number.      Medication: Atorvastatin

## 2020-10-28 ENCOUNTER — OFFICE VISIT (OUTPATIENT)
Dept: OCCUPATIONAL MEDICINE | Facility: HOSPITAL | Age: 70
End: 2020-10-28
Attending: Other
Payer: MEDICARE

## 2020-10-28 DIAGNOSIS — Z79.4 TYPE 2 DIABETES MELLITUS WITH DIABETIC NEUROPATHY, WITH LONG-TERM CURRENT USE OF INSULIN (HCC): ICD-10-CM

## 2020-10-28 DIAGNOSIS — E11.40 TYPE 2 DIABETES MELLITUS WITH DIABETIC NEUROPATHY, WITH LONG-TERM CURRENT USE OF INSULIN (HCC): ICD-10-CM

## 2020-10-28 PROCEDURE — 97140 MANUAL THERAPY 1/> REGIONS: CPT

## 2020-10-28 NOTE — PROGRESS NOTES
Dx: Lymphedema (I89.0)         Insurance (Authorized # of Visits) 13/16 (3/3 insurance approved visits)    Next MD/Plan Renewal Date: 1/11/2021    Authorizing Physician: Dr. Dayron Dawson: HIGH        Precautions:  Left UE limb precautions; HIGH FAL reduction of 25.3cm. TREATMENT:   *Clinic RW used with pt for ambulation lobby to/from treatment room. Reinforced with pt the use of her RW in the community setting for improved safety/balance and gait speed.   *Discussed extended use of sterile pad; need density to boggy to slightly boggy to reduce infection/wound risk. 6. Pt will be independent in use of compression garments, self-manual lymph drainage, decongestive exercises and lymphedema precautions for life-long self-management of lymphedema.

## 2020-10-29 ENCOUNTER — TELEPHONE (OUTPATIENT)
Dept: FAMILY MEDICINE CLINIC | Facility: CLINIC | Age: 70
End: 2020-10-29

## 2020-10-29 NOTE — TELEPHONE ENCOUNTER
Received fax from [facility], patient having cataract surgery 01/20/21, H&P required. Patient scheduled with Dr. Daria Clemente 12/28/20.  Fax in triage

## 2020-10-30 ENCOUNTER — OFFICE VISIT (OUTPATIENT)
Dept: OCCUPATIONAL MEDICINE | Facility: HOSPITAL | Age: 70
End: 2020-10-30
Attending: Other
Payer: MEDICARE

## 2020-10-30 PROCEDURE — 97140 MANUAL THERAPY 1/> REGIONS: CPT

## 2020-10-30 NOTE — PROGRESS NOTES
Dx: Lymphedema (I89.0)         Insurance (Authorized # of Visits) 14/16 (1/3 insurance approved visits)    Next MD/Plan Renewal Date: 1/11/2021    Authorizing Physician: Dr. Layne Coast: HIGH        Precautions:  Left UE limb precautions; HIGH FAL DM.   *Skin hygiene and care. *Bilateral lower quadrant manual lymph drainage with gentle soft tissue mobilization of dense areas of lymphedema.  Observed improved superficial tissue mobility in lower legs by end of session with reduction of volume over d

## 2020-11-03 ENCOUNTER — OFFICE VISIT (OUTPATIENT)
Dept: HEMATOLOGY/ONCOLOGY | Facility: HOSPITAL | Age: 70
End: 2020-11-03
Attending: SPECIALIST
Payer: MEDICARE

## 2020-11-03 VITALS
BODY MASS INDEX: 27 KG/M2 | SYSTOLIC BLOOD PRESSURE: 127 MMHG | RESPIRATION RATE: 16 BRPM | TEMPERATURE: 98 F | DIASTOLIC BLOOD PRESSURE: 79 MMHG | WEIGHT: 161 LBS | HEART RATE: 92 BPM | OXYGEN SATURATION: 99 %

## 2020-11-03 DIAGNOSIS — Z80.3 FAMILY HISTORY OF BREAST CANCER: ICD-10-CM

## 2020-11-03 DIAGNOSIS — Z17.0 MALIGNANT NEOPLASM OF LOWER-INNER QUADRANT OF LEFT BREAST IN FEMALE, ESTROGEN RECEPTOR POSITIVE (HCC): ICD-10-CM

## 2020-11-03 DIAGNOSIS — Z12.31 VISIT FOR SCREENING MAMMOGRAM: Primary | ICD-10-CM

## 2020-11-03 DIAGNOSIS — C50.312 MALIGNANT NEOPLASM OF LOWER-INNER QUADRANT OF LEFT BREAST IN FEMALE, ESTROGEN RECEPTOR POSITIVE (HCC): ICD-10-CM

## 2020-11-03 PROCEDURE — 99205 OFFICE O/P NEW HI 60 MIN: CPT | Performed by: SPECIALIST

## 2020-11-03 NOTE — PROGRESS NOTES
THE [facility] Consultation Note      Patient Name: Natalie Hess   YOB: 1950  Medical Record Number: [id number]  Consulting Physician: Sybil Alonso. Anita Herndon M.D.    Referring Physician: Yevgeniy Downey MD    Date of Consulta times daily with meals. , Disp: 180 tablet, Rfl: 1    •  CLOPIDOGREL BISULFATE 75 MG Oral Tab, TAKE 1 TABLET EVERY DAY, Disp: 90 tablet, Rfl: 3    •  metFORMIN HCl 1000 MG Oral Tab, Take 1 tablet (1,000 mg total) by mouth 2 (two) times daily with meals. Hipolito Martinez not apply Misc, , Disp: , Rfl:     •  Vitamin C 500 MG Oral Tab, Take 500 mg by mouth daily. , Disp: , Rfl:     •  Multiple Vitamin (ONE-DAILY MULTI VITAMINS) Oral Tab, Take 1 tablet by mouth daily. , Disp: , Rfl:       Allergies   Ms. Yuni Mejias is allergic Normal effort; no respiratory distress; lungs clear to auscultation bilaterally. Cardiovascular     Regular rate and rhythm; normal S1S2. Abdomen            Non-tender; non-distended; no masses; no fluid wave; no hepatosplenomegaly.   Extremities (L) 1.0 - 2.0    FASTING Yes    CBC W/ DIFFERENTIAL    Collection Time: 03/02/20 10:50 AM   Result Value Ref Range    WBC 6.0 4.0 - 11.0 x10(3) uL    RBC 4.08 3.80 - 5.30 x10(6)uL    HGB 12.3 12.0 - 16.0 g/dL    HCT 37.0 35.0 - 48.0 %    .0 150.0 - Glucose 111 (H) 70 - 99 mg/dL     Radiology   03/02/2020:  PROCEDURE:  [facility] IMAN 2D+3D SCREENING BILAT (CPT=77067/92575)     COMPARISON:  [facility] , MG, [facility] IMAN 2D+3D SCREENING BILAT (CPT=77067/71889), 2/28/2019, 9:44 AM.     INDICATIONS:  Z12.31 Encounter f evaluated with a computer-aided device.   This patient's information has been entered into a reminder system with a target due date for the next mammogram.           Dictated by: Anna Hyman MD on 3/02/2020 at 11:53 AM       Approved by: Haven Villalobos

## 2020-11-04 ENCOUNTER — OFFICE VISIT (OUTPATIENT)
Dept: OCCUPATIONAL MEDICINE | Facility: HOSPITAL | Age: 70
End: 2020-11-04
Attending: Other
Payer: MEDICARE

## 2020-11-04 PROCEDURE — 97140 MANUAL THERAPY 1/> REGIONS: CPT

## 2020-11-04 NOTE — PROGRESS NOTES
Dx: Lymphedema (I89.0)         Insurance (Authorized # of Visits) 15/16 (2/3 insurance approved visits)    Next MD/Plan Renewal Date: 1/11/2021    Authorizing Physician: Dr. Rosalita Mohs: HIGH        Precautions:  Left UE limb precautions; HIGH FAL ambulation lobby to/from treatment room. Reinforced with pt the use of her RW in the community setting for improved safety/balance and gait speed. *Skin hygiene and care.    *Bilateral lower quadrant manual lymph drainage with gentle soft tissue mobilizati

## 2020-11-06 NOTE — TELEPHONE ENCOUNTER
Requested Prescriptions     Pending Prescriptions Disp Refills   • GLIMEPIRIDE 4 MG Oral Tab [Pharmacy Med Name: GLIMEPIRIDE 4 MG Tablet] 90 tablet 0     Sig: TAKE 1 TABLET EVERY MORNING BEFORE BREAKFAST     LOV 2/17/2020     Patient was asked to follow-up

## 2020-11-10 NOTE — TELEPHONE ENCOUNTER
Requested Prescriptions     Signed Prescriptions Disp Refills   • Benazepril HCl 20 MG Oral Tab 90 tablet 1     Sig: Take 1 tablet (20 mg total) by mouth daily.      Authorizing Provider: Dejuan Smith     Ordering User: Jesus Alberto Leung     [address] [place] [address] 7/8/20 eladia

## 2020-11-11 NOTE — PROGRESS NOTES
FALL SCREEN EVALUATION   Referring Physician: Dr. Katerina Cortes  Diagnosis: Increased risk for falls    Date of Service: 11/11/2020     PATIENT SUMMARY   Angela Muro is a 72year old female who presents to therapy today with complaints of impaired balanc chair, have better stamina, be able to get herself up from the floor after a fall. Past medical history was reviewed with Devendra Salmon.  Significant findings include atherosclerosis or aorta, essential hypertension, mixed hyperlipidemia, cerebrovascular disea SpO2: 98%  Posture/Observation: the patient has compression bandages on bilateral lower extremities. In standing the patient has slight knee flexion bilaterally.     LE Strength: Dorsiflexion: R 5/5, L 4/5, knee flexion 4/5 bilaterally, hip flexion/knee ext Timed Treatment: 3 min     Total Treatment Time: 36 min (decreased time due to patient arriving late to PT session)    Based on clinical rationale and outcome measures, this evaluation involved High Complexity decision making due to 3+ personal factors/com care.    X___________________________________________________ Date____________________    Certification From: 26/99/3860  To:2/9/2021

## 2020-11-11 NOTE — PROGRESS NOTES
Discharge Summary  Pt has attended 16/16 visits in Occupational Therapy from 8/11-11/10/2020. Subjective:   Pt reports she has ordered a second set of Circaid ankle/foot garments.  Has been wearing her older Circaid lower leg garments for the past 2 day care, decongestive exercises and self-manual lymph drainage in areas of functional reach. She was delayed to today's session d/t traffic.  GARMENT SPECIFICS: liner; Circaid JuxtaLite AFW, size Small; Circaid JuxtaFit Essentials lower leg garment, size Mediu garments every 9-12 months. 2.  Continue with daily skin care and completion of decongestive exercises and self-manual lymph drainage in areas of functional reach. 3.  Contact this therapist/department for any questions/concerns.         Goals: (to be me

## 2020-11-18 NOTE — PROGRESS NOTES
Dx:  Increased risk for falls         Insurance (Authorized # of Visits): 10 POC            Authorizing Physician: Dr. Daria Dior MD visit: none scheduled  Fall Risk: standard         Precautions: n/a             Subjective:  The patient states that she breanna Access Code: [email or web address]   Exercises  Standing March with Counter Support - 10 reps - 2 sets - 1x daily - 7x weekly    Charges: there ex x1, neuro re-ed x2      Total Timed Treatment: 45 min  Total Treatment Time: 45 min

## 2020-11-30 NOTE — TELEPHONE ENCOUNTER
LAURY for pt to call our office to reschedule her apptment with Dr. Jasper Mo for DOS 12/28/20 @ 6:30 PM due to Dr. Jasper Mo will not be in the office.

## 2020-12-04 NOTE — PROGRESS NOTES
Neurology Outpatient    Gricelda Sexton Patient Status:  No patient class for patient encounter    1950 MRN [id number]   Location [facility] Attending No att. providers found   [facility] Day #  PCP Miguel Shanks MD     Subjective:  Silvina Robles Chlorhexidine Gluconate 0.12 % Mouth/Throat Solution      • CARVEDILOL 12.5 MG Oral Tab TAKE 1 TABLET(12.5 MG) BY MOUTH TWICE DAILY WITH MEALS 180 tablet 0   • carvedilol 12.5 MG Oral Tab Take 1 tablet (12.5 mg total) by mouth 2 (two) times daily with meal Oral Tab Take 1 tablet by mouth daily.          Problem List:  Patient Active Problem List:     Malignant neoplasm of female breast ([address]     CAD, multiple vessel     Osteoporosis     Essential hypertension     Mixed hyperlipidemia     Vitamin D deficiency Diverticulitis    • Easy bruising    • Exposure to medical diagnostic radiation     2008   • Facial cellulitis 4/14/2015   • Fatigue    • Heart attack ([facility] [address]     12/6/2013   • High cholesterol    • Leaking of urine    • Leg swelling    • Neuropathy    • NST social    Drug use: No      Family History:  Family History   Problem Relation Age of Onset   • Diabetes Father    • Heart Disease Father    • Heart Attack Father    • Cancer Sister 62        uterine   • Breast Cancer Maternal Grandmother 58   • Breast Can Triceps 5 5 Hip Adductors     Wrist Extensors   Quads 5- 5   Wrist Flexors   Hamstrings     Finger Extensors   A.  Dorsiflexion 5 5   Finger Flexors   Eversion     Thenar muscles   Inversion      5 5 Plantar Flexion 5 5   Interossei   Toe Extension may be in part related to posttreatment/chemotherapy effect and/or chronic ischemic small vessel disease. No evidence for intracranial metastatic   disease.  MRA neck normal. Intracranial MRA shows focal stenosis of the proximal aspect of the left posterior Plavix and Atorvastatin if she would prefer.  Her stroke is now 3years old    Tomy Richter, [place]  Neurology

## 2020-12-07 NOTE — PROGRESS NOTES
Referring Provider:  Kenny Church MD    Additional Provider(s):  Noel Pires MD    Reason for Referral:  Kandace Perkins was referred for genetic counseling because of a personal and family history of breast cancer.   Ms. Fara Turner is a 72year-old w cancer syndrome.   Signs of a hereditary cancer syndrome include some rare cancers, common cancers occurring at unusually young ages, multiple primary cancers in the same individual, or the same type of cancer or related cancers (e.g., breast and ovarian, c should be discussed with a physician.       A variant of uncertain significance is a DNA change that may or may not alter the function of the gene; therefore, it is usually not possible to determine if the gene variant is responsible for an individual’s inc management options for BRCA1/2 mutation carriers include increased breast cancer screening using mammograms and MRI, ovarian cancer screening, chemoprevention, and prophylactic surgery.   Men with a BRCA1/2 mutation should discuss clinical breast exams, dig

## 2020-12-08 NOTE — PROGRESS NOTES
Dx:  Increased risk for falls         Insurance (Authorized # of Visits): 10 POC            Authorizing Physician: Dr. Andreas Dior MD visit: none scheduled  Fall Risk: standard         Precautions: n/a             Subjective:  The patient reports that she do 2x10 ea  Standing marching 2x10 ea with UE support There ex  Sit<>stand from standard chair 2x5, 3 other times t/o session  Step up 4\" box 5x ea with UE support      Neuro re-ed  Feet apart firm surface 2x30 sec  Feet apart firm surface head turns horizon

## 2020-12-23 NOTE — PROGRESS NOTES
Referring Provider:                    Ramirez Tavarez MD     Additional Provider(s):              Gus Galo MD    Reason for Referral:  Imelda Nunez had genetic testing performed on 12/4/2020 because of a personal and family history of breast can

## 2020-12-28 PROBLEM — D64.9 ANEMIA: Status: RESOLVED | Noted: 2019-09-26 | Resolved: 2020-01-01

## 2020-12-28 PROBLEM — L03.119 CELLULITIS AND ABSCESS OF FOOT: Status: RESOLVED | Noted: 2019-09-26 | Resolved: 2020-01-01

## 2020-12-28 PROBLEM — L02.619 CELLULITIS AND ABSCESS OF FOOT: Status: RESOLVED | Noted: 2019-09-26 | Resolved: 2020-01-01

## 2020-12-31 ENCOUNTER — PRIOR ORIGINAL RECORDS (OUTPATIENT)
Dept: OTHER | Age: 70
End: 2020-12-31

## 2021-01-01 ENCOUNTER — IMMUNIZATION (OUTPATIENT)
Dept: LAB | Age: 71
End: 2021-01-01
Attending: HOSPITALIST
Payer: MEDICARE

## 2021-01-01 ENCOUNTER — OFFICE VISIT (OUTPATIENT)
Dept: NEUROLOGY | Facility: CLINIC | Age: 71
End: 2021-01-01
Payer: MEDICARE

## 2021-01-01 ENCOUNTER — TELEPHONE (OUTPATIENT)
Dept: OCCUPATIONAL MEDICINE | Facility: HOSPITAL | Age: 71
End: 2021-01-01

## 2021-01-01 ENCOUNTER — TELEPHONE (OUTPATIENT)
Dept: FAMILY MEDICINE CLINIC | Facility: CLINIC | Age: 71
End: 2021-01-01

## 2021-01-01 ENCOUNTER — OFFICE VISIT (OUTPATIENT)
Dept: PHYSICAL THERAPY | Facility: HOSPITAL | Age: 71
End: 2021-01-01
Attending: FAMILY MEDICINE
Payer: MEDICARE

## 2021-01-01 ENCOUNTER — APPOINTMENT (OUTPATIENT)
Dept: MRI IMAGING | Facility: HOSPITAL | Age: 71
End: 2021-01-01
Attending: EMERGENCY MEDICINE
Payer: MEDICARE

## 2021-01-01 ENCOUNTER — SNF VISIT (OUTPATIENT)
Dept: INTERNAL MEDICINE CLINIC | Age: 71
End: 2021-01-01

## 2021-01-01 ENCOUNTER — TELEPHONE (OUTPATIENT)
Dept: PHYSICAL THERAPY | Facility: HOSPITAL | Age: 71
End: 2021-01-01

## 2021-01-01 ENCOUNTER — LAB ENCOUNTER (OUTPATIENT)
Dept: LAB | Facility: HOSPITAL | Age: 71
End: 2021-01-01
Attending: FAMILY MEDICINE
Payer: MEDICARE

## 2021-01-01 ENCOUNTER — APPOINTMENT (OUTPATIENT)
Dept: OCCUPATIONAL MEDICINE | Facility: HOSPITAL | Age: 71
End: 2021-01-01
Attending: FAMILY MEDICINE
Payer: MEDICARE

## 2021-01-01 ENCOUNTER — TELEPHONE (OUTPATIENT)
Dept: ADMINISTRATIVE | Age: 71
End: 2021-01-01

## 2021-01-01 ENCOUNTER — APPOINTMENT (OUTPATIENT)
Dept: CT IMAGING | Facility: HOSPITAL | Age: 71
End: 2021-01-01
Attending: EMERGENCY MEDICINE
Payer: MEDICARE

## 2021-01-01 ENCOUNTER — OFFICE VISIT (OUTPATIENT)
Dept: FAMILY MEDICINE CLINIC | Facility: CLINIC | Age: 71
End: 2021-01-01
Payer: MEDICARE

## 2021-01-01 ENCOUNTER — ORDER TRANSCRIPTION (OUTPATIENT)
Dept: PHYSICAL THERAPY | Facility: HOSPITAL | Age: 71
End: 2021-01-01

## 2021-01-01 ENCOUNTER — LAB ENCOUNTER (OUTPATIENT)
Dept: LAB | Facility: HOSPITAL | Age: 71
End: 2021-01-01
Attending: OPHTHALMOLOGY
Payer: MEDICARE

## 2021-01-01 ENCOUNTER — SNF DISCHARGE (OUTPATIENT)
Dept: INTERNAL MEDICINE CLINIC | Age: 71
End: 2021-01-01

## 2021-01-01 ENCOUNTER — MOBILE ENCOUNTER (OUTPATIENT)
Dept: FAMILY MEDICINE CLINIC | Facility: CLINIC | Age: 71
End: 2021-01-01

## 2021-01-01 ENCOUNTER — APPOINTMENT (OUTPATIENT)
Dept: GENERAL RADIOLOGY | Facility: HOSPITAL | Age: 71
End: 2021-01-01
Attending: EMERGENCY MEDICINE
Payer: MEDICARE

## 2021-01-01 ENCOUNTER — TELEPHONE (OUTPATIENT)
Dept: NEUROLOGY | Facility: CLINIC | Age: 71
End: 2021-01-01

## 2021-01-01 ENCOUNTER — NURSE ONLY (OUTPATIENT)
Dept: ELECTROPHYSIOLOGY | Facility: HOSPITAL | Age: 71
End: 2021-01-01
Attending: Other
Payer: MEDICARE

## 2021-01-01 ENCOUNTER — EXTERNAL FACILITY (OUTPATIENT)
Dept: FAMILY MEDICINE CLINIC | Facility: CLINIC | Age: 71
End: 2021-01-01

## 2021-01-01 ENCOUNTER — HOSPITAL ENCOUNTER (OUTPATIENT)
Dept: MAMMOGRAPHY | Facility: HOSPITAL | Age: 71
Discharge: HOME | End: 2021-01-01
Attending: FAMILY MEDICINE
Payer: MEDICARE

## 2021-01-01 ENCOUNTER — HOSPITAL ENCOUNTER (OUTPATIENT)
Facility: HOSPITAL | Age: 71
Setting detail: OBSERVATION
Discharge: NURSING, REHAB OR LONG-TERM CARE | End: 2021-01-01
Attending: EMERGENCY MEDICINE | Admitting: HOSPITALIST
Payer: MEDICARE

## 2021-01-01 ENCOUNTER — HOSPITAL ENCOUNTER (OUTPATIENT)
Dept: MRI IMAGING | Facility: HOSPITAL | Age: 71
Discharge: HOME | End: 2021-01-01
Attending: Other
Payer: MEDICARE

## 2021-01-01 ENCOUNTER — LAB ENCOUNTER (OUTPATIENT)
Dept: LAB | Age: 71
End: 2021-01-01
Attending: FAMILY MEDICINE
Payer: MEDICARE

## 2021-01-01 ENCOUNTER — HOSPITAL ENCOUNTER (EMERGENCY)
Facility: HOSPITAL | Age: 71
Discharge: HOME | End: 2021-01-01
Attending: EMERGENCY MEDICINE
Payer: MEDICARE

## 2021-01-01 ENCOUNTER — HOSPITAL ENCOUNTER (EMERGENCY)
Facility: HOSPITAL | Age: 71
End: 2021-01-01
Attending: EMERGENCY MEDICINE
Payer: MEDICARE

## 2021-01-01 ENCOUNTER — APPOINTMENT (OUTPATIENT)
Dept: HEMATOLOGY/ONCOLOGY | Facility: HOSPITAL | Age: 71
End: 2021-01-01
Attending: SPECIALIST
Payer: MEDICARE

## 2021-01-01 ENCOUNTER — HOSPITAL ENCOUNTER (OUTPATIENT)
Dept: BONE DENSITY | Age: 71
Discharge: HOME | End: 2021-01-01
Attending: FAMILY MEDICINE
Payer: MEDICARE

## 2021-01-01 ENCOUNTER — INITIAL APN SNF VISIT (OUTPATIENT)
Dept: INTERNAL MEDICINE CLINIC | Age: 71
End: 2021-01-01

## 2021-01-01 ENCOUNTER — HOSPITAL ENCOUNTER (OUTPATIENT)
Dept: ULTRASOUND IMAGING | Facility: HOSPITAL | Age: 71
Discharge: HOME | End: 2021-01-01
Attending: FAMILY MEDICINE
Payer: MEDICARE

## 2021-01-01 VITALS
HEART RATE: 89 BPM | SYSTOLIC BLOOD PRESSURE: 193 MMHG | BODY MASS INDEX: 27.49 KG/M2 | HEIGHT: 65 IN | TEMPERATURE: 98 F | OXYGEN SATURATION: 99 % | DIASTOLIC BLOOD PRESSURE: 90 MMHG | RESPIRATION RATE: 18 BRPM | WEIGHT: 165 LBS

## 2021-01-01 VITALS
SYSTOLIC BLOOD PRESSURE: 132 MMHG | DIASTOLIC BLOOD PRESSURE: 82 MMHG | TEMPERATURE: 98 F | RESPIRATION RATE: 18 BRPM | HEART RATE: 86 BPM

## 2021-01-01 VITALS
DIASTOLIC BLOOD PRESSURE: 69 MMHG | SYSTOLIC BLOOD PRESSURE: 132 MMHG | RESPIRATION RATE: 18 BRPM | WEIGHT: 164 LBS | HEART RATE: 80 BPM | BODY MASS INDEX: 28 KG/M2 | TEMPERATURE: 98 F

## 2021-01-01 VITALS
RESPIRATION RATE: 18 BRPM | DIASTOLIC BLOOD PRESSURE: 60 MMHG | HEART RATE: 87 BPM | SYSTOLIC BLOOD PRESSURE: 122 MMHG | HEIGHT: 65 IN | WEIGHT: 163 LBS | BODY MASS INDEX: 27.16 KG/M2

## 2021-01-01 VITALS
RESPIRATION RATE: 16 BRPM | SYSTOLIC BLOOD PRESSURE: 134 MMHG | HEART RATE: 80 BPM | DIASTOLIC BLOOD PRESSURE: 68 MMHG | BODY MASS INDEX: 27 KG/M2 | WEIGHT: 165 LBS

## 2021-01-01 VITALS
BODY MASS INDEX: 28.17 KG/M2 | SYSTOLIC BLOOD PRESSURE: 170 MMHG | RESPIRATION RATE: 14 BRPM | HEART RATE: 72 BPM | DIASTOLIC BLOOD PRESSURE: 71 MMHG | OXYGEN SATURATION: 98 % | HEIGHT: 64 IN | TEMPERATURE: 98 F | WEIGHT: 165 LBS

## 2021-01-01 VITALS — HEART RATE: 84 BPM | RESPIRATION RATE: 18 BRPM | SYSTOLIC BLOOD PRESSURE: 120 MMHG | DIASTOLIC BLOOD PRESSURE: 72 MMHG

## 2021-01-01 VITALS
RESPIRATION RATE: 18 BRPM | OXYGEN SATURATION: 96 % | HEART RATE: 82 BPM | BODY MASS INDEX: 28 KG/M2 | TEMPERATURE: 98 F | DIASTOLIC BLOOD PRESSURE: 75 MMHG | SYSTOLIC BLOOD PRESSURE: 160 MMHG | WEIGHT: 163 LBS

## 2021-01-01 VITALS
OXYGEN SATURATION: 96 % | SYSTOLIC BLOOD PRESSURE: 115 MMHG | DIASTOLIC BLOOD PRESSURE: 77 MMHG | RESPIRATION RATE: 16 BRPM | HEART RATE: 82 BPM

## 2021-01-01 VITALS
DIASTOLIC BLOOD PRESSURE: 67 MMHG | TEMPERATURE: 98 F | RESPIRATION RATE: 18 BRPM | HEART RATE: 72 BPM | SYSTOLIC BLOOD PRESSURE: 134 MMHG

## 2021-01-01 VITALS — BODY MASS INDEX: 28 KG/M2 | WEIGHT: 163.13 LBS

## 2021-01-01 DIAGNOSIS — I89.0 LYMPHEDEMA: ICD-10-CM

## 2021-01-01 DIAGNOSIS — E11.40 PAINFUL DIABETIC NEUROPATHY (HCC): ICD-10-CM

## 2021-01-01 DIAGNOSIS — M79.671 FOOT PAIN, BILATERAL: Primary | ICD-10-CM

## 2021-01-01 DIAGNOSIS — I63.9 BRAINSTEM INFARCTION (HCC): ICD-10-CM

## 2021-01-01 DIAGNOSIS — E78.5 HYPERLIPIDEMIA LDL GOAL <100: Chronic | ICD-10-CM

## 2021-01-01 DIAGNOSIS — Z01.818 PRE-PROCEDURAL EXAMINATION: ICD-10-CM

## 2021-01-01 DIAGNOSIS — C50.012 BILATERAL MALIGNANT NEOPLASM OF AREOLA OF BREAST IN FEMALE, UNSPECIFIED ESTROGEN RECEPTOR STATUS (HCC): Primary | ICD-10-CM

## 2021-01-01 DIAGNOSIS — I70.0 ATHEROSCLEROSIS OF AORTA (HCC): Chronic | ICD-10-CM

## 2021-01-01 DIAGNOSIS — E87.1 HYPONATREMIA: ICD-10-CM

## 2021-01-01 DIAGNOSIS — R80.9 TYPE 2 DIABETES MELLITUS WITH MICROALBUMINURIA, WITH LONG-TERM CURRENT USE OF INSULIN (HCC): ICD-10-CM

## 2021-01-01 DIAGNOSIS — R53.81 PHYSICAL DECONDITIONING: ICD-10-CM

## 2021-01-01 DIAGNOSIS — E55.9 VITAMIN D DEFICIENCY: ICD-10-CM

## 2021-01-01 DIAGNOSIS — I63.9 BRAINSTEM INFARCTION (HCC): Primary | ICD-10-CM

## 2021-01-01 DIAGNOSIS — I69.351 HEMIPARESIS AFFECTING RIGHT SIDE AS LATE EFFECT OF STROKE (HCC): ICD-10-CM

## 2021-01-01 DIAGNOSIS — E11.40 TYPE 2 DIABETES MELLITUS WITH DIABETIC NEUROPATHY, WITH LONG-TERM CURRENT USE OF INSULIN (HCC): ICD-10-CM

## 2021-01-01 DIAGNOSIS — E11.29 TYPE 2 DIABETES MELLITUS WITH OTHER KIDNEY COMPLICATION, UNSPECIFIED WHETHER LONG TERM INSULIN USE (HCC): Primary | ICD-10-CM

## 2021-01-01 DIAGNOSIS — Z79.4 TYPE 2 DIABETES MELLITUS WITH DIABETIC NEUROPATHY, WITH LONG-TERM CURRENT USE OF INSULIN (HCC): ICD-10-CM

## 2021-01-01 DIAGNOSIS — E11.319 DIABETIC RETINOPATHY OF BOTH EYES ASSOCIATED WITH TYPE 2 DIABETES MELLITUS, MACULAR EDEMA PRESENCE UNSPECIFIED, UNSPECIFIED RETINOPATHY SEVERITY (HCC): Chronic | ICD-10-CM

## 2021-01-01 DIAGNOSIS — I96 GANGRENOUS TOE (HCC): ICD-10-CM

## 2021-01-01 DIAGNOSIS — R07.9 CHEST PAIN, UNSPECIFIED TYPE: Primary | ICD-10-CM

## 2021-01-01 DIAGNOSIS — R21 RASH: ICD-10-CM

## 2021-01-01 DIAGNOSIS — E11.00 TYPE 2 DIABETES MELLITUS WITH HYPEROSMOLARITY WITHOUT COMA, UNSPECIFIED WHETHER LONG TERM INSULIN USE (HCC): ICD-10-CM

## 2021-01-01 DIAGNOSIS — E11.40 TYPE 2 DIABETES MELLITUS WITH DIABETIC NEUROPATHY, WITH LONG-TERM CURRENT USE OF INSULIN (HCC): Primary | ICD-10-CM

## 2021-01-01 DIAGNOSIS — Z00.00 ANNUAL PHYSICAL EXAM: Primary | ICD-10-CM

## 2021-01-01 DIAGNOSIS — R26.9 GAIT ABNORMALITY: ICD-10-CM

## 2021-01-01 DIAGNOSIS — S32.000S LUMBAR COMPRESSION FRACTURE, SEQUELA: ICD-10-CM

## 2021-01-01 DIAGNOSIS — E04.2 MULTINODULAR GOITER (NONTOXIC): ICD-10-CM

## 2021-01-01 DIAGNOSIS — I25.10 CAD, MULTIPLE VESSEL: Chronic | ICD-10-CM

## 2021-01-01 DIAGNOSIS — R53.83 FATIGUE, UNSPECIFIED TYPE: ICD-10-CM

## 2021-01-01 DIAGNOSIS — R27.8 TRUNCAL ATAXIA: ICD-10-CM

## 2021-01-01 DIAGNOSIS — I67.2 CEREBRAL ATHEROSCLEROSIS: ICD-10-CM

## 2021-01-01 DIAGNOSIS — E11.29 TYPE II DIABETES MELLITUS WITH RENAL MANIFESTATIONS, UNCONTROLLED (HCC): Primary | ICD-10-CM

## 2021-01-01 DIAGNOSIS — R29.6 FREQUENT FALLS: ICD-10-CM

## 2021-01-01 DIAGNOSIS — Z86.73 HISTORY OF STROKE: ICD-10-CM

## 2021-01-01 DIAGNOSIS — C50.011 BILATERAL MALIGNANT NEOPLASM OF AREOLA OF BREAST IN FEMALE, UNSPECIFIED ESTROGEN RECEPTOR STATUS (HCC): Primary | ICD-10-CM

## 2021-01-01 DIAGNOSIS — I67.9 CEREBROVASCULAR DISEASE: ICD-10-CM

## 2021-01-01 DIAGNOSIS — I25.10 CAD, MULTIPLE VESSEL: ICD-10-CM

## 2021-01-01 DIAGNOSIS — R26.2 DIFFICULTY WALKING: ICD-10-CM

## 2021-01-01 DIAGNOSIS — I63.9 CEREBROVASCULAR ACCIDENT (CVA), UNSPECIFIED MECHANISM (HCC): ICD-10-CM

## 2021-01-01 DIAGNOSIS — Z23 NEED FOR IMMUNIZATION AGAINST INFLUENZA: ICD-10-CM

## 2021-01-01 DIAGNOSIS — F32.1 MODERATE SINGLE CURRENT EPISODE OF MAJOR DEPRESSIVE DISORDER (HCC): ICD-10-CM

## 2021-01-01 DIAGNOSIS — R90.0 INTRACRANIAL MASS: ICD-10-CM

## 2021-01-01 DIAGNOSIS — E11.29 TYPE 2 DIABETES MELLITUS WITH OTHER KIDNEY COMPLICATION, UNSPECIFIED WHETHER LONG TERM INSULIN USE (HCC): ICD-10-CM

## 2021-01-01 DIAGNOSIS — E78.5 DYSLIPIDEMIA: ICD-10-CM

## 2021-01-01 DIAGNOSIS — L97.921 SKIN ULCER OF LEFT LOWER LEG, LIMITED TO BREAKDOWN OF SKIN (HCC): Primary | ICD-10-CM

## 2021-01-01 DIAGNOSIS — M81.6 LOCALIZED OSTEOPOROSIS WITHOUT CURRENT PATHOLOGICAL FRACTURE: ICD-10-CM

## 2021-01-01 DIAGNOSIS — R29.898 TRANSIENT WEAKNESS OF LOWER EXTREMITY: Primary | ICD-10-CM

## 2021-01-01 DIAGNOSIS — D32.0 MENINGIOMA, CEREBRAL (HCC): ICD-10-CM

## 2021-01-01 DIAGNOSIS — Z86.73 HISTORY OF CEREBRAL INFARCTION: ICD-10-CM

## 2021-01-01 DIAGNOSIS — M79.672 FOOT PAIN, BILATERAL: Primary | ICD-10-CM

## 2021-01-01 DIAGNOSIS — M25.531 RIGHT WRIST PAIN: ICD-10-CM

## 2021-01-01 DIAGNOSIS — L97.929 SKIN ULCER OF LEFT LOWER LEG, UNSPECIFIED ULCER STAGE (HCC): ICD-10-CM

## 2021-01-01 DIAGNOSIS — I25.10 CORONARY ARTERY DISEASE INVOLVING NATIVE CORONARY ARTERY OF NATIVE HEART WITHOUT ANGINA PECTORIS: ICD-10-CM

## 2021-01-01 DIAGNOSIS — Z17.0 MALIGNANT NEOPLASM OF LOWER-INNER QUADRANT OF LEFT BREAST IN FEMALE, ESTROGEN RECEPTOR POSITIVE (HCC): Chronic | ICD-10-CM

## 2021-01-01 DIAGNOSIS — E11.29 TYPE 2 DIABETES MELLITUS WITH MICROALBUMINURIA, WITH LONG-TERM CURRENT USE OF INSULIN (HCC): ICD-10-CM

## 2021-01-01 DIAGNOSIS — S63.501S SPRAIN OF RIGHT WRIST, SEQUELA: Primary | ICD-10-CM

## 2021-01-01 DIAGNOSIS — I70.0 ATHEROSCLEROSIS OF AORTA (HCC): ICD-10-CM

## 2021-01-01 DIAGNOSIS — C50.312 MALIGNANT NEOPLASM OF LOWER-INNER QUADRANT OF LEFT BREAST IN FEMALE, ESTROGEN RECEPTOR POSITIVE (HCC): Chronic | ICD-10-CM

## 2021-01-01 DIAGNOSIS — R74.8 ELEVATED LIVER ENZYMES: ICD-10-CM

## 2021-01-01 DIAGNOSIS — I10 ESSENTIAL HYPERTENSION: ICD-10-CM

## 2021-01-01 DIAGNOSIS — E78.2 MIXED HYPERLIPIDEMIA: ICD-10-CM

## 2021-01-01 DIAGNOSIS — M47.816 ARTHRITIS OF FACET JOINT OF LUMBAR SPINE: ICD-10-CM

## 2021-01-01 DIAGNOSIS — M25.531 WRIST PAIN, RIGHT: Primary | ICD-10-CM

## 2021-01-01 DIAGNOSIS — R53.1 WEAKNESS: ICD-10-CM

## 2021-01-01 DIAGNOSIS — Z79.4 TYPE 2 DIABETES MELLITUS WITH MICROALBUMINURIA, WITH LONG-TERM CURRENT USE OF INSULIN (HCC): ICD-10-CM

## 2021-01-01 DIAGNOSIS — Z00.00 ENCOUNTER FOR ANNUAL HEALTH EXAMINATION: ICD-10-CM

## 2021-01-01 DIAGNOSIS — Z23 NEED FOR VACCINATION: Primary | ICD-10-CM

## 2021-01-01 DIAGNOSIS — Z12.31 VISIT FOR SCREENING MAMMOGRAM: ICD-10-CM

## 2021-01-01 DIAGNOSIS — C50.312 MALIGNANT NEOPLASM OF LOWER-INNER QUADRANT OF LEFT FEMALE BREAST, UNSPECIFIED ESTROGEN RECEPTOR STATUS (HCC): Chronic | ICD-10-CM

## 2021-01-01 DIAGNOSIS — Z99.89 OSA ON CPAP: Chronic | ICD-10-CM

## 2021-01-01 DIAGNOSIS — R26.89 LOSS OF BALANCE: ICD-10-CM

## 2021-01-01 DIAGNOSIS — I89.0 LYMPHEDEMA: Primary | ICD-10-CM

## 2021-01-01 DIAGNOSIS — R26.89 BALANCE DISORDER: ICD-10-CM

## 2021-01-01 DIAGNOSIS — E11.40 TYPE 2 DIABETES, CONTROLLED, WITH NEUROPATHY (HCC): Chronic | ICD-10-CM

## 2021-01-01 DIAGNOSIS — R26.89 FUNCTIONAL GAIT ABNORMALITY: Primary | ICD-10-CM

## 2021-01-01 DIAGNOSIS — D32.9 MENINGIOMA (HCC): Primary | ICD-10-CM

## 2021-01-01 DIAGNOSIS — E16.2 HYPOGLYCEMIA: ICD-10-CM

## 2021-01-01 DIAGNOSIS — E11.49 DIABETIC NEUROPATHY WITH NEUROLOGIC COMPLICATION (HCC): ICD-10-CM

## 2021-01-01 DIAGNOSIS — D32.9 BENIGN MENINGIOMA (HCC): ICD-10-CM

## 2021-01-01 DIAGNOSIS — I46.9 CARDIAC ARREST (HCC): Primary | ICD-10-CM

## 2021-01-01 DIAGNOSIS — E11.65 TYPE II DIABETES MELLITUS WITH RENAL MANIFESTATIONS, UNCONTROLLED (HCC): Primary | ICD-10-CM

## 2021-01-01 DIAGNOSIS — I25.10 CAD, MULTIPLE VESSEL: Primary | ICD-10-CM

## 2021-01-01 DIAGNOSIS — Z79.4 TYPE 2 DIABETES MELLITUS WITH DIABETIC NEUROPATHY, WITH LONG-TERM CURRENT USE OF INSULIN (HCC): Primary | ICD-10-CM

## 2021-01-01 DIAGNOSIS — G47.33 OSA ON CPAP: Chronic | ICD-10-CM

## 2021-01-01 DIAGNOSIS — L97.309: ICD-10-CM

## 2021-01-01 DIAGNOSIS — M19.90 ARTHRITIS: ICD-10-CM

## 2021-01-01 DIAGNOSIS — S63.501A SPRAIN OF RIGHT WRIST, INITIAL ENCOUNTER: Primary | ICD-10-CM

## 2021-01-01 DIAGNOSIS — I10 PRIMARY HYPERTENSION: ICD-10-CM

## 2021-01-01 DIAGNOSIS — E08.42 DIABETIC POLYNEUROPATHY ASSOCIATED WITH DIABETES MELLITUS DUE TO UNDERLYING CONDITION (HCC): ICD-10-CM

## 2021-01-01 DIAGNOSIS — Z23 NEED FOR VACCINATION: ICD-10-CM

## 2021-01-01 DIAGNOSIS — E11.40 DIABETIC NEUROPATHY WITH NEUROLOGIC COMPLICATION (HCC): ICD-10-CM

## 2021-01-01 DIAGNOSIS — I87.312 CHRONIC VENOUS HYPERTENSION (IDIOPATHIC) WITH ULCER OF LEFT LOWER EXTREMITY (CODE) (HCC): ICD-10-CM

## 2021-01-01 LAB — SARS-COV-2 RNA RESP QL NAA+PROBE: NOT DETECTED

## 2021-01-01 PROCEDURE — 97535 SELF CARE MNGMENT TRAINING: CPT

## 2021-01-01 PROCEDURE — 99217 OBSERVATION CARE DISCHARGE: CPT | Performed by: HOSPITALIST

## 2021-01-01 PROCEDURE — 85025 COMPLETE CBC W/AUTO DIFF WBC: CPT

## 2021-01-01 PROCEDURE — 99214 OFFICE O/P EST MOD 30 MIN: CPT | Performed by: OTHER

## 2021-01-01 PROCEDURE — 97112 NEUROMUSCULAR REEDUCATION: CPT | Performed by: PHYSICAL THERAPIST

## 2021-01-01 PROCEDURE — 76536 US EXAM OF HEAD AND NECK: CPT | Performed by: NURSE PRACTITIONER

## 2021-01-01 PROCEDURE — 97140 MANUAL THERAPY 1/> REGIONS: CPT

## 2021-01-01 PROCEDURE — 3078F DIAST BP <80 MM HG: CPT | Performed by: NURSE PRACTITIONER

## 2021-01-01 PROCEDURE — 82272 OCCULT BLD FECES 1-3 TESTS: CPT

## 2021-01-01 PROCEDURE — 3075F SYST BP GE 130 - 139MM HG: CPT | Performed by: OTHER

## 2021-01-01 PROCEDURE — 97110 THERAPEUTIC EXERCISES: CPT | Performed by: PHYSICAL THERAPIST

## 2021-01-01 PROCEDURE — 77080 DXA BONE DENSITY AXIAL: CPT | Performed by: FAMILY MEDICINE

## 2021-01-01 PROCEDURE — 99213 OFFICE O/P EST LOW 20 MIN: CPT | Performed by: OTHER

## 2021-01-01 PROCEDURE — 3078F DIAST BP <80 MM HG: CPT | Performed by: OTHER

## 2021-01-01 PROCEDURE — 31500 INSERT EMERGENCY AIRWAY: CPT

## 2021-01-01 PROCEDURE — 99283 EMERGENCY DEPT VISIT LOW MDM: CPT

## 2021-01-01 PROCEDURE — 77067 SCR MAMMO BI INCL CAD: CPT | Performed by: FAMILY MEDICINE

## 2021-01-01 PROCEDURE — 99397 PER PM REEVAL EST PAT 65+ YR: CPT | Performed by: FAMILY MEDICINE

## 2021-01-01 PROCEDURE — 36415 COLL VENOUS BLD VENIPUNCTURE: CPT

## 2021-01-01 PROCEDURE — 72148 MRI LUMBAR SPINE W/O DYE: CPT | Performed by: OTHER

## 2021-01-01 PROCEDURE — 96374 THER/PROPH/DIAG INJ IV PUSH: CPT

## 2021-01-01 PROCEDURE — 99306 1ST NF CARE HIGH MDM 50: CPT | Performed by: FAMILY MEDICINE

## 2021-01-01 PROCEDURE — 0001A SARSCOV2 VAC 30MCG/0.3ML IM: CPT

## 2021-01-01 PROCEDURE — 82565 ASSAY OF CREATININE: CPT

## 2021-01-01 PROCEDURE — 99212 OFFICE O/P EST SF 10 MIN: CPT | Performed by: NEUROLOGICAL SURGERY

## 2021-01-01 PROCEDURE — 99310 SBSQ NF CARE HIGH MDM 45: CPT | Performed by: NURSE PRACTITIONER

## 2021-01-01 PROCEDURE — 73110 X-RAY EXAM OF WRIST: CPT | Performed by: EMERGENCY MEDICINE

## 2021-01-01 PROCEDURE — 83036 HEMOGLOBIN GLYCOSYLATED A1C: CPT

## 2021-01-01 PROCEDURE — 95816 EEG AWAKE AND DROWSY: CPT | Performed by: OTHER

## 2021-01-01 PROCEDURE — 82306 VITAMIN D 25 HYDROXY: CPT

## 2021-01-01 PROCEDURE — 82962 GLUCOSE BLOOD TEST: CPT

## 2021-01-01 PROCEDURE — 90662 IIV NO PRSV INCREASED AG IM: CPT | Performed by: FAMILY MEDICINE

## 2021-01-01 PROCEDURE — 96375 TX/PRO/DX INJ NEW DRUG ADDON: CPT

## 2021-01-01 PROCEDURE — 70553 MRI BRAIN STEM W/O & W/DYE: CPT | Performed by: EMERGENCY MEDICINE

## 2021-01-01 PROCEDURE — 80053 COMPREHEN METABOLIC PANEL: CPT

## 2021-01-01 PROCEDURE — 99214 OFFICE O/P EST MOD 30 MIN: CPT | Performed by: FAMILY MEDICINE

## 2021-01-01 PROCEDURE — 97140 MANUAL THERAPY 1/> REGIONS: CPT | Performed by: PHYSICAL THERAPIST

## 2021-01-01 PROCEDURE — 84132 ASSAY OF SERUM POTASSIUM: CPT

## 2021-01-01 PROCEDURE — 84450 TRANSFERASE (AST) (SGOT): CPT

## 2021-01-01 PROCEDURE — 92950 HEART/LUNG RESUSCITATION CPR: CPT

## 2021-01-01 PROCEDURE — 97163 PT EVAL HIGH COMPLEX 45 MIN: CPT | Performed by: PHYSICAL THERAPIST

## 2021-01-01 PROCEDURE — 3074F SYST BP LT 130 MM HG: CPT | Performed by: OTHER

## 2021-01-01 PROCEDURE — 84460 ALANINE AMINO (ALT) (SGPT): CPT

## 2021-01-01 PROCEDURE — G0008 ADMIN INFLUENZA VIRUS VAC: HCPCS | Performed by: FAMILY MEDICINE

## 2021-01-01 PROCEDURE — 93010 ELECTROCARDIOGRAM REPORT: CPT

## 2021-01-01 PROCEDURE — 99309 SBSQ NF CARE MODERATE MDM 30: CPT | Performed by: NURSE PRACTITIONER

## 2021-01-01 PROCEDURE — 84439 ASSAY OF FREE THYROXINE: CPT

## 2021-01-01 PROCEDURE — 99225 SUBSEQUENT OBSERVATION CARE: CPT | Performed by: INTERNAL MEDICINE

## 2021-01-01 PROCEDURE — 3079F DIAST BP 80-89 MM HG: CPT | Performed by: FAMILY MEDICINE

## 2021-01-01 PROCEDURE — 93005 ELECTROCARDIOGRAM TRACING: CPT

## 2021-01-01 PROCEDURE — 80061 LIPID PANEL: CPT

## 2021-01-01 PROCEDURE — 99285 EMERGENCY DEPT VISIT HI MDM: CPT

## 2021-01-01 PROCEDURE — 99316 NF DSCHRG MGMT 30 MIN+: CPT | Performed by: NURSE PRACTITIONER

## 2021-01-01 PROCEDURE — 3077F SYST BP >= 140 MM HG: CPT | Performed by: NURSE PRACTITIONER

## 2021-01-01 PROCEDURE — 0002A SARSCOV2 VAC 30MCG/0.3ML IM: CPT

## 2021-01-01 PROCEDURE — 97167 OT EVAL HIGH COMPLEX 60 MIN: CPT

## 2021-01-01 PROCEDURE — 3078F DIAST BP <80 MM HG: CPT | Performed by: FAMILY MEDICINE

## 2021-01-01 PROCEDURE — 99202 OFFICE O/P NEW SF 15 MIN: CPT | Performed by: NEUROLOGICAL SURGERY

## 2021-01-01 PROCEDURE — 3074F SYST BP LT 130 MM HG: CPT | Performed by: NURSE PRACTITIONER

## 2021-01-01 PROCEDURE — 3008F BODY MASS INDEX DOCD: CPT | Performed by: FAMILY MEDICINE

## 2021-01-01 PROCEDURE — 1111F DSCHRG MED/CURRENT MED MERGE: CPT | Performed by: FAMILY MEDICINE

## 2021-01-01 PROCEDURE — 84443 ASSAY THYROID STIM HORMONE: CPT

## 2021-01-01 PROCEDURE — 99309 SBSQ NF CARE MODERATE MDM 30: CPT | Performed by: FAMILY MEDICINE

## 2021-01-01 PROCEDURE — 77063 BREAST TOMOSYNTHESIS BI: CPT | Performed by: FAMILY MEDICINE

## 2021-01-01 PROCEDURE — 3074F SYST BP LT 130 MM HG: CPT | Performed by: FAMILY MEDICINE

## 2021-01-01 PROCEDURE — 3075F SYST BP GE 130 - 139MM HG: CPT | Performed by: NURSE PRACTITIONER

## 2021-01-01 PROCEDURE — 70450 CT HEAD/BRAIN W/O DYE: CPT | Performed by: EMERGENCY MEDICINE

## 2021-01-01 PROCEDURE — 96160 PT-FOCUSED HLTH RISK ASSMT: CPT | Performed by: FAMILY MEDICINE

## 2021-01-01 PROCEDURE — 99220 INITIAL OBSERVATION CARE,LEVL III: CPT | Performed by: HOSPITALIST

## 2021-01-01 PROCEDURE — 3075F SYST BP GE 130 - 139MM HG: CPT | Performed by: FAMILY MEDICINE

## 2021-01-01 PROCEDURE — G0439 PPPS, SUBSEQ VISIT: HCPCS | Performed by: FAMILY MEDICINE

## 2021-01-01 PROCEDURE — 72131 CT LUMBAR SPINE W/O DYE: CPT | Performed by: EMERGENCY MEDICINE

## 2021-01-01 RX ORDER — ATORVASTATIN CALCIUM 40 MG/1
40 TABLET, FILM COATED ORAL NIGHTLY
Status: DISCONTINUED | OUTPATIENT
Start: 2021-01-01 | End: 2021-01-01

## 2021-01-01 RX ORDER — GLIMEPIRIDE 4 MG/1
4 TABLET ORAL
Qty: 90 TABLET | Refills: 3 | Status: ON HOLD | OUTPATIENT
Start: 2021-01-01 | End: 2021-01-01

## 2021-01-01 RX ORDER — DEXAMETHASONE 4 MG/1
TABLET ORAL
Qty: 24 TABLET | Refills: 0 | Status: SHIPPED | OUTPATIENT
Start: 2021-01-01 | End: 2021-01-01 | Stop reason: ALTCHOICE

## 2021-01-01 RX ORDER — HEPARIN SODIUM 5000 [USP'U]/ML
5000 INJECTION, SOLUTION INTRAVENOUS; SUBCUTANEOUS EVERY 8 HOURS SCHEDULED
Status: DISCONTINUED | OUTPATIENT
Start: 2021-01-01 | End: 2021-01-01

## 2021-01-01 RX ORDER — ACETAMINOPHEN 325 MG/1
650 TABLET ORAL EVERY 6 HOURS PRN
COMMUNITY

## 2021-01-01 RX ORDER — CLOPIDOGREL BISULFATE 75 MG/1
75 TABLET ORAL DAILY
Qty: 90 TABLET | Refills: 2 | Status: SHIPPED | OUTPATIENT
Start: 2021-01-01

## 2021-01-01 RX ORDER — VERAPAMIL HYDROCHLORIDE 240 MG/1
240 TABLET, FILM COATED, EXTENDED RELEASE ORAL NIGHTLY
Refills: 3 | Status: DISCONTINUED | OUTPATIENT
Start: 2021-01-01 | End: 2021-01-01

## 2021-01-01 RX ORDER — DEXAMETHASONE 4 MG/1
4 TABLET ORAL EVERY 8 HOURS SCHEDULED
Status: DISCONTINUED | OUTPATIENT
Start: 2021-01-01 | End: 2021-01-01

## 2021-01-01 RX ORDER — DEXAMETHASONE SODIUM PHOSPHATE 4 MG/ML
4 VIAL (ML) INJECTION EVERY 6 HOURS
Status: DISCONTINUED | OUTPATIENT
Start: 2021-01-01 | End: 2021-01-01

## 2021-01-01 RX ORDER — ACYCLOVIR 800 MG/1
800 TABLET ORAL
Qty: 30 TABLET | Refills: 0 | Status: SHIPPED | OUTPATIENT
Start: 2021-01-01 | End: 2021-01-01

## 2021-01-01 RX ORDER — CARVEDILOL 12.5 MG/1
12.5 TABLET ORAL 2 TIMES DAILY WITH MEALS
Status: DISCONTINUED | OUTPATIENT
Start: 2021-01-01 | End: 2021-01-01

## 2021-01-01 RX ORDER — ACETAMINOPHEN AND CODEINE PHOSPHATE 300; 30 MG/1; MG/1
1 TABLET ORAL EVERY 4 HOURS PRN
Status: DISCONTINUED | OUTPATIENT
Start: 2021-01-01 | End: 2021-01-01

## 2021-01-01 RX ORDER — LISINOPRIL 10 MG/1
20 TABLET ORAL DAILY
Refills: 1 | Status: DISCONTINUED | OUTPATIENT
Start: 2021-01-01 | End: 2021-01-01

## 2021-01-01 RX ORDER — BLOOD-GLUCOSE METER
1 EACH MISCELLANEOUS 4 TIMES DAILY
Qty: 1 KIT | Refills: 0 | Status: SHIPPED | OUTPATIENT
Start: 2021-01-01 | End: 2022-06-09

## 2021-01-01 RX ORDER — BLOOD GLUCOSE CONTROL HIGH,LOW
EACH MISCELLANEOUS
Qty: 1 EACH | Refills: 0 | OUTPATIENT
Start: 2021-01-01

## 2021-01-01 RX ORDER — BLOOD SUGAR DIAGNOSTIC
STRIP MISCELLANEOUS
Qty: 400 STRIP | Refills: 0 | Status: SHIPPED | OUTPATIENT
Start: 2021-01-01

## 2021-01-01 RX ORDER — DEXTROSE MONOHYDRATE 25 G/50ML
50 INJECTION, SOLUTION INTRAVENOUS
Status: DISCONTINUED | OUTPATIENT
Start: 2021-01-01 | End: 2021-01-01

## 2021-01-01 RX ORDER — ATORVASTATIN CALCIUM 40 MG/1
TABLET, FILM COATED ORAL
Qty: 90 TABLET | Refills: 3 | Status: SHIPPED | OUTPATIENT
Start: 2021-01-01

## 2021-01-01 RX ORDER — BLOOD GLUCOSE CONTROL HIGH,LOW
EACH MISCELLANEOUS
Qty: 1 EACH | Refills: 0 | Status: SHIPPED | OUTPATIENT
Start: 2021-01-01

## 2021-01-01 RX ORDER — CALCIUM CHLORIDE 100 MG/ML
INJECTION INTRAVENOUS; INTRAVENTRICULAR
Status: COMPLETED
Start: 2021-01-01 | End: 2021-01-01

## 2021-01-01 RX ORDER — BLOOD SUGAR DIAGNOSTIC
STRIP MISCELLANEOUS
Qty: 400 STRIP | Refills: 0 | OUTPATIENT
Start: 2021-01-01

## 2021-01-01 RX ORDER — GLIMEPIRIDE 4 MG/1
4 TABLET ORAL
Qty: 90 TABLET | Refills: 3 | Status: SHIPPED | COMMUNITY
Start: 2021-01-01 | End: 2021-01-01

## 2021-01-01 RX ORDER — CLOPIDOGREL BISULFATE 75 MG/1
75 TABLET ORAL DAILY
Status: DISCONTINUED | OUTPATIENT
Start: 2021-01-01 | End: 2021-01-01

## 2021-01-01 RX ORDER — CARVEDILOL 12.5 MG/1
TABLET ORAL
Qty: 180 TABLET | Refills: 0 | Status: SHIPPED | OUTPATIENT
Start: 2021-01-01 | End: 2021-01-01

## 2021-01-01 RX ORDER — ISOPROPYL ALCOHOL 0.75 G/1
SWAB TOPICAL
Qty: 100 EACH | Refills: 1 | Status: SHIPPED | OUTPATIENT
Start: 2021-01-01 | End: 2021-01-01

## 2021-01-01 RX ORDER — ERGOCALCIFEROL (VITAMIN D2) 1250 MCG
50000 CAPSULE ORAL WEEKLY
Qty: 30 CAPSULE | Refills: 1 | Status: SHIPPED | OUTPATIENT
Start: 2021-01-01

## 2021-01-01 RX ORDER — CALCIUM CHLORIDE 100 MG/ML
INJECTION INTRAVENOUS; INTRAVENTRICULAR
Status: DISCONTINUED | OUTPATIENT
Start: 2021-01-01 | End: 2021-01-01

## 2021-01-01 RX ORDER — ACYCLOVIR 800 MG/1
800 TABLET ORAL
Status: DISCONTINUED | OUTPATIENT
Start: 2021-01-01 | End: 2021-01-01

## 2021-01-01 RX ORDER — BLOOD SUGAR DIAGNOSTIC
STRIP MISCELLANEOUS
Qty: 400 STRIP | Refills: 3 | Status: SHIPPED | OUTPATIENT
Start: 2021-01-01 | End: 2022-06-09

## 2021-01-01 RX ORDER — ACETAMINOPHEN AND CODEINE PHOSPHATE 300; 30 MG/1; MG/1
1 TABLET ORAL EVERY 6 HOURS PRN
COMMUNITY

## 2021-01-01 RX ORDER — DEXAMETHASONE 4 MG/1
4 TABLET ORAL EVERY 6 HOURS SCHEDULED
Status: DISCONTINUED | OUTPATIENT
Start: 2021-01-01 | End: 2021-01-01

## 2021-01-01 RX ORDER — FUROSEMIDE 20 MG/1
20 TABLET ORAL DAILY
Status: DISCONTINUED | OUTPATIENT
Start: 2021-01-01 | End: 2021-01-01

## 2021-01-08 NOTE — ASSESSMENT & PLAN NOTE
Stable, Continue present management.     Thyroid  (most recent labs)   Lab Results   Component Value Date/Time    TSH 0.983 03/02/2020 10:50 AM    T4F 0.9 03/02/2020 10:50 AM    TSHT4 0.77 07/08/2013 12:10 PM

## 2021-01-08 NOTE — PATIENT INSTRUCTIONS
[facility] SCREENING SCHEDULE   Tests on this list are recommended by your physician but may not be covered, or covered at this frequency, by your insurer. Please check with your insurance carrier before scheduling to verify coverage.    PREV 65-75) IPPE only No results found. However, due to the size of the patient record, not all encounters were searched. Please check Results Review for a complete set of results.  Limited to patients who meet one of the following criteria:   • Men who are 65-7 display for this patient. Chlamydia  Annually if high risk No results found for: CHLAMYDIA No flowsheet data found.     Screening Mammogram      Mammogram    Recommend Annually to at least age 76, and as needed after 76 Mammogram due on 03/02/2021 Kim patient record, not all encounters were searched. Please check Results Review for a complete set of results.  Medium/high risk factors:   End-stage renal disease   Hemophiliacs who received Factor VIII or IX concentrates   Clients of institutions for the me information page that I am trying to post my recent thoughts and insights into COVID and other conditions.  Thanks and stay well, Alva Peterson MD        Medication Sig   • Clopidogrel Bisulfate 75 MG Oral Tab    • Benazepril HCl 20 MG Oral Tab    • GLIMEPIRIDE

## 2021-01-08 NOTE — PROGRESS NOTES
HPI:   Serena Avery is a 72year old female who presents for a MA (Medicare Advantage) Supervisit (Once per calendar year). Patient coming in for annual physical as well as preop clearance for cataracts.   She did he had Covid back last May and hopeless: More than half the days  PHQ-2 SCORE: 4     PHQ-9 Depression Screen     1. Little interest or pleasure in doing things: More than half the days  2. Feeling down, depressed, or hopeless: More than half the days  3.  Trouble falling or staying aslee (CARDIOLOGY)  Vicenta Bumpers, DPM ([address])  Debbie Shaw MD (SURGERY, VASCULAR)  JOANN Arango (Nurse Practitioner)  Trev Velasquez OT as Occupational Therapist (Occupational Therapist)  Amy Barrios PT as Physical Therapist (Ph CA 9.3 12/04/2020    ALB 3.6 12/04/2020    TSH 0.983 03/02/2020    CREATSERUM 0.55 12/04/2020     (H) 12/04/2020        CBC  (most recent labs)   Lab Results   Component Value Date    WBC 6.0 03/02/2020    HGB 12.3 03/02/2020    .0 03/02/2 furosemide 20 MG Oral Tab, Take 1 tablet (20 mg total) by mouth daily. •  insulin glargine (TOUJEO SOLOSTAR) 300 UNIT/ML Subcutaneous Solution Pen-injector, Inject 16 Units into the skin daily.     •  Incontinence Supply Disposable Does not apply Misc, Breast Surgery; removal gallbladder; angioplasty (coronary) (); radiation left (); chemotherapy ();  Cataract extraction (Left);  (, ); lumpectomy left (); cataract; other; colonoscopy (N/A, 3/5/2019); and amputation metatar Corrected(cannot see)     Left Eye Visual Acuity: Corrected Left Eye Chart Acuity: 20/40   Both Eyes Visual Acuity: Corrected Both Eyes Chart Acuity: 20/40   Able To Tolerate Visual Acuity: Yes      Physical Exam     Vaccination History     Immunization Hi Atenolol 50, verapamil 240, furosemide 20, benazepril 20  (Was on lotrel, stopped 8/16)           Current Assessment & Plan     Stable, Continue present management.     Blood Pressure and Cardiac Medications          Benazepril HCl 20 MG Oral Tab    CAR Value Date    A1C 7.2 (H) 03/02/2020    A1C 6.6 (A) 12/18/2019      Blood Sugar Medications          GLIMEPIRIDE 4 MG Oral Tab    metFORMIN HCl 1000 MG Oral Tab    insulin glargine (TOUJEO SOLOSTAR) 300 UNIT/ML Subcutaneous Solution Pen-injector recommended         Current Assessment & Plan     Stable function.  Continue present management             Digestive    Vitamin D deficiency    Overview     Vit D 50,000 IU weekly         Current Assessment & Plan     Stable continue present management well as clearance for the surgery. 30 minutes was spent altogether with patient and 20 minutes documenting after the fact as well on day of office visit. Reinforced healthy diet, lifestyle, and exercise. Return in 6 months (on 7/8/2021).      Brissa Sanchez found.    Glaucoma Screening      Ophthalmology Visit Annually: Diabetics, FHx Glaucoma, AA>50, > 65 Data entered on: 9/16/2019   Last Dilated Eye Exam 9/10/2019       Bone Density Screening      Dexascan Every two years No results found for this o anticonvulsants.)    Potassium  Annually Potassium (mmol/L)   Date Value   12/04/2020 3.8     POTASSIUM (mmol/L)   Date Value   12/07/2013 3.8   11/23/2013 4.1    No flowsheet data found.     Creatinine  Annually CREATININE (mg/dL)   Date Value   12/07/2013

## 2021-01-08 NOTE — H&P
Daria Bae is a 72year old female who presents for a pre-operative physical exam at the request of Dr. Jack Anguiano for evaluation of preoperative risk. Patient is to have cataract surgery, to be done by Dr. Jack Anguiano  at [facility] for surgery on 1/20/2020.     P AS DIRECTED    •  Insulin Pen Needle (BD PEN NEEDLE ADALID U/F) 32G X 4 MM Does not apply Misc, Use Once Daily With Insulin. •  ergocalciferol 97872 units Oral Cap, Take 1 capsule (50,000 Units total) by mouth once a week.     •  furosemide 20 MG Oral Tab, carvedilol, carvedilol, metformin hcl, atorvastatin, verapamil hcl er, accu-chek skye plus, accu-chek skye, acetaminophen-codeine #3, accu-chek skye plus, and furosemide. She is allergic to gabapentin and adhesive tape.    She  reports that she has nev consult was sent back the referring physician, Dr. Luis Mcbride. Problem List Items Addressed This Visit        Cardiovascular    Atherosclerosis of aorta (HCC) (Chronic)    Overview     Seen on 9/12/12 CXR, atorvastatin 20, seeing Dr Albertina Edwards.            Cornel Rivas Overview     Normal thyroid levels         Current Assessment & Plan     Stable, Continue present management.     Thyroid  (most recent labs)   Lab Results   Component Value Date/Time    TSH 0.983 03/02/2020 10:50 AM    T4F 0.9 03/02/2020 10:50 AM    TSHT4 8/11/2016 with Ataxia, falling to right.  MR showed brainstem infarct, Admitted to hospital and then rehab at [facility] until early 9.2016         Current Assessment & Plan     Stable continue present management          Hemiparesis affecting right side as la stable but difficulty with pain and spine         Current Assessment & Plan     Stable continue present management          Osteoporosis    Overview     Last dexa 2/2015, alendronate 70 weekly started February 2015         Current Assessment & Plan     [facility]

## 2021-01-08 NOTE — ASSESSMENT & PLAN NOTE
Stable, Continue present management.     Blood Pressure and Cardiac Medications          Benazepril HCl 20 MG Oral Tab    CARVEDILOL 12.5 MG Oral Tab    carvedilol 12.5 MG Oral Tab    Verapamil HCl  MG Oral Capsule SR 24 Hr

## 2021-01-08 NOTE — ASSESSMENT & PLAN NOTE
As for her Diabetes, it is well controlled, no significant medication side effects noted. Some hyperglycemia.  Needs to get labs    Recommendations are: continue present meds, lose weight by increased dietary compliance and exercise and will check labs as o

## 2021-05-12 NOTE — TELEPHONE ENCOUNTER
1. CAD, multiple vessel (Primary)  Overview:  4.2015, NSTEMI 2013, Dr Kelsie Garner initially and Dr. Maya Mcfadden now managing  Orders:  -     CARDIO - INTERNAL  2.  Atherosclerosis of aorta (HCC)  Overview:  Seen on 9/12/12 CXR, atorvastatin 20, seeing Dr Rafael Florez

## 2021-05-12 NOTE — TELEPHONE ENCOUNTER
Patient is calling she wants a referral for a knew cardiologist she does not like DR Moreau they don't click. Please notifey patient of who to see.

## 2021-05-12 NOTE — TELEPHONE ENCOUNTER
Dr. Lev Perez, see note below patient would like a different cardiologist other than Dr. Osmar Olson M.D. Last visit with you was 1/8/21. Okay to refer to Dr. Tamera Duane?

## 2021-05-18 NOTE — TELEPHONE ENCOUNTER
Referral request RANDEE Duenas and [facility]  Fax number: [phone]    Diabetic Shoes  Diabetic Slippers     x 2   x 6    Diagnosis: E11.29 and E11.40    Auth per [facility] [place] [id number] valid 5/18/21 to 7/17/2021

## 2021-05-18 NOTE — TELEPHONE ENCOUNTER
Pt needs a referral for diabetic shoes and slippers. She was told that she has to have a referral even though she is paying out of pocket for her slippers.   Carole Pink and the fax number [phone] Ani Carey

## 2021-05-21 NOTE — ED INITIAL ASSESSMENT (HPI)
Patient here with c/o trip and fall at home. Patient landed on wrists trying to catch herself. Patient has right wrist pain with some swelling noted. Denies hitting head or LOC.   Patient AOx3

## 2021-05-21 NOTE — ED PROVIDER NOTES
Patient Seen in: [facility] Emergency Department      History   Patient presents with:  Fall    Stated Complaint: Fall    HPI/Subjective:   HPI    64-year-old female past medical history of CAD hypertension hyperlipidemia diabetes presents ED with c AHI 58 [address] [place] 72%   • Osteomyelitis of right foot (HCC)    • Osteoporosis    • Personal history of antineoplastic chemotherapy     2008   • Stented coronary artery    • Stroke [facility])     2016-walks with difficulty- uses cane   • Supraventricular tachyca (Room air)       Current:BP (!) 193/90   Pulse 89   Temp 97.9 °F (36.6 °C) (Temporal)   Resp 18   Ht 165.1 cm (5' 5\")   Wt 74.8 kg   SpO2 99%   BMI 27.46 kg/m²         Physical Exam  Vitals and nursing note reviewed. Constitutional:       Appearance:  Sh have small area of ecchymosis just proximal to the wrist on the dorsal radial aspect of her forearm. X-rays of the wrist to include this region of her forearm did not show any acute fracture-dislocation deformity.   As patient does have some pain in the wr

## 2021-05-21 NOTE — ED QUICK NOTES
Patient discharged to home with ortho follow-up and OTC medication for pain discussed. Patient AOx3 with no signs of acute distress.

## 2021-05-24 NOTE — TELEPHONE ENCOUNTER
patient request  a referral for Ortho ,Olena Miramontes,For (R) Wrist  Pain Ignore the previus referral for foot pain That Referral is for Podiatry Only.

## 2021-05-24 NOTE — TELEPHONE ENCOUNTER
Who are you and who is Dr Carlos Mckeon?    this Is highly irregular and I sent message to [address] director as there are many issues with this message

## 2021-05-24 NOTE — TELEPHONE ENCOUNTER
Patient call and request 2 [id number]  for Carlos Moran,2nd [address] [facility] ,Patient was seen in Er May 21,2021.

## 2021-05-25 NOTE — TELEPHONE ENCOUNTER
Patient seen in the ER 5/21/21 for right wrist sprain. Patient at that time was instructed to follow-up with Dr. Arslan Ponce M.D. from [place] [address].   Patient has appt with Dr. Morgan Lopez M.D. from [place] [address] this week and asking for

## 2021-05-25 NOTE — TELEPHONE ENCOUNTER
Pt requesting a referral for physical therapy with Alfredo Argueta. Pt states she is not satisfied with the physical therapist she has now.

## 2021-05-25 NOTE — TELEPHONE ENCOUNTER
Patient states she is dragging her right foot, no stamina, can't walk distances, fell recently and injured right wrist.   Patient has not been in since January of 2021 to see Dr. Lenore Martinez M.D. Appt made for patient to see Dr. Lenore Martinez M.D.   Patient just seen i

## 2021-05-26 NOTE — TELEPHONE ENCOUNTER
Requested Prescriptions     Pending Prescriptions Disp Refills   • ATORVASTATIN 40 MG Oral Tab [Pharmacy Med Name: ATORVASTATIN 40MG TABLETS] 90 tablet 3     Sig: TAKE 1 TABLET(40 MG) BY MOUTH EVERY NIGHT     LOV 1/8/2021     Patient was asked to follow-up

## 2021-05-28 NOTE — PROGRESS NOTES
Alee Segura is a 72year old female coming in for had concerns including Fall (fell on thursday night, dragging foot, walking is getting worse ).     HPI/Subjective:   HPI fell last week and taken to ER for eval. Foot and wrist issues so seeing ortho and podi microalbuminuria, with long-term current use of insulin ([facility] [address]  3.  Type 2 diabetes mellitus with diabetic neuropathy, with long-term current use of insulin ([facility])  Overview:  Dx 1995, Actos 45, metformin 1000 BID, Toujeo  , novolog, stopped glimiperide, need Incontinence Supply Disposable, insulin glargine, ergocalciferol, furosemide, Insulin Pen Needle, Accu-Chek Anabelle Plus, BD Swab Single Use Regular, Loperamide HCl, Acetaminophen-Codeine #3, Accu-Chek Anabelle Plus, Accu-Chek Anabelle, Verapamil HCl ER, metFORMIN

## 2021-06-01 NOTE — TELEPHONE ENCOUNTER
Requested Prescriptions     Pending Prescriptions Disp Refills   • CARVEDILOL 12.5 MG Oral Tab [Pharmacy Med Name: CARVEDILOL 12.5 MG Tablet] 180 tablet 0     Sig: TAKE 1 TABLET TWICE DAILY WITH MEALS     LOV 5/28/2021     Patient was asked to follow-up in

## 2021-06-03 NOTE — TELEPHONE ENCOUNTER
Refill request Vit D    Result Care Coordination    Patient Communication  Not seen  Back to [employer],   Your Vit D level came back normal.   Please continue your once weekly 50,000 international units. Thank you, ...    Written by Christiano Hill

## 2021-06-04 NOTE — TELEPHONE ENCOUNTER
ACCU-CHEK ANN MARIE PLUS   Strip     The original prescription was reordered on 6/3/2021 by Geoffrey Laird MD. Renewing this prescription may not be appropriate.      Possible duplicate: Hover to review recent actions on this medication    Sig: TEST BLOOD SUGAR

## 2021-06-04 NOTE — TELEPHONE ENCOUNTER
Last OV 5/28/2021 w/ Glas gait   LOV physical 1/6/2021 w/ Glas  Last HgA1C 04/12/2021  Will refill for one year    Type 2 diabetes mellitus with diabetic neuropathy, with long-term current use of insulin [facility]      Overview       Dx 1995, Actos 45, metform

## 2021-06-08 PROBLEM — R29.6 FREQUENT FALLS: Status: ACTIVE | Noted: 2021-01-01

## 2021-06-08 PROBLEM — E11.40 DIABETIC NEUROPATHY (HCC): Status: ACTIVE | Noted: 2021-01-01

## 2021-06-08 NOTE — PROGRESS NOTES
Patient states decrease in gait, patient is shuffling more. Patient is also dragging her right foot. Patient states her most recent fall was about a week ago.

## 2021-06-08 NOTE — TELEPHONE ENCOUNTER
Pt calling to ask if she needs approval from Dr. Samuel Singh for PT ordered by Dr. Elijah Paul? Please return call to pt.

## 2021-06-08 NOTE — PROGRESS NOTES
Neurology Outpatient    [facility] Patient Status:  No patient class for patient encounter    1950 MRN [id number]   Location [facility] Attending No att. providers found   [facility] Day #  PCP Bharath James MD     Subjective:  Author Sever Medications   Medication Sig Dispense Refill   • METFORMIN HCL 1000 MG Oral Tab TAKE 1 TABLET TWICE DAILY WITH MEALS 180 tablet 3   • Glucose Blood (ACCU-CHEK ANN MARIE PLUS) In Vitro Strip TEST BLOOD SUGAR FOUR TIMES DAILY 400 strip 0   • Blood Glucose Calibr furosemide 20 MG Oral Tab Take 1 tablet (20 mg total) by mouth daily. 90 tablet 3   • insulin glargine (TOUJEO SOLOSTAR) 300 UNIT/ML Subcutaneous Solution Pen-injector Inject 16 Units into the skin daily.  9 mL 0   • Incontinence Supply Disposable Does not • Bad breath    • Breast CA (HCC)    • CAD in native artery 9/4/2005   • Cancer [facility]) 2008    breast    • Cataract    • CORONARY ARTERY DISEASE    • Coronary heart disease 3/31/2014   • Dental abscess 4/14/2015   • Diabetes ([facility] [address]    • Diabetes mellitus ( SURGERY UNLISTED  1/1/08    Wrist repair   • LUMPECTOMY LEFT  2008   • OTHER      Wrist surgery- left wrist- 02/2007   • RADIATION LEFT  2008   • REMOVAL GALLBLADDER         SocHx:  Social History    Tobacco Use      Smoking status: Never Smoker      Smoke muscles & jaw muscles; palate elevation intact, no dysarthria, no tongue fasciculations or atrophy, strong shoulder shrug.     MOTOR EXAMINATION: normal tone, LE exam is limited by pain      UE Muscle R L LE muscle R L   Deltoid 4+ 5 Hip Flexors 3+ 4-   Bic posterior cerebral arteries. Anterior communicating, and right posterior    indicating artery patent. There is a focal stenosis of the proximal aspect of the left PCA.          CONCLUSION:  No evidence for acute infarct. No restricted diffusion.  Extensive c parkinsonian features with exception of shuffling gait but that may be due more to her deconditioning and neuropathy. Has hip and low back pains. Also has a h/o lumbar and cervical spine stenosis.  I will order MRI L spine and PT. EMG/NCS would be too diffi

## 2021-06-18 NOTE — TELEPHONE ENCOUNTER
Referral request Dr. Lino Yu M.D.,Endocrinology    Six visits    Diagnosis: E11.29    Auth per [facility] [id number] valid 6/18/21 to 12/15/2021

## 2021-06-18 NOTE — TELEPHONE ENCOUNTER
Dolores Tavarez from Dr. Presley [place] office states pt has an appointment next Thursday 06/24/21 for a diabetic/ thyroid follow up. Dolores Tavarez states referral can be entered through [employer].

## 2021-06-22 NOTE — TELEPHONE ENCOUNTER
Patient asking for referral to Vascular Surgeon. TC to patient to get name of whom she is planning to see so we can enter referral to [facility] for her. Asked patient to return our call.

## 2021-06-30 NOTE — TELEPHONE ENCOUNTER
FMLA PAPERWORK COMPLETION REQUEST    Please fill out all that applies to this paperwork    1. Name of whom the paperwork is for (patient or relative): [facility]  2. Contact number: [phone]  3. Nature of condition for FMLA form:   4.  What date

## 2021-07-01 NOTE — TELEPHONE ENCOUNTER
Called patient's  Vaibhav Rubio and left message on machine letting him know that forms were completed but that he should review them before we fax them to [facility] per Dr. Carlos Ricketts' message.  Advised him forms were here for him to , let him know he could

## 2021-07-01 NOTE — TELEPHONE ENCOUNTER
Form is available in my office in the fax back bin. Okay to notify, but ideally this should be filled out at a face-to-face visit as I likely did not enter the information that they are specifically requesting.   A written request like this likely will end

## 2021-07-02 NOTE — TELEPHONE ENCOUNTER
Referral for MRI is approved only until July 10th, Central Scheduling couldn't schedule for any earlier than July 13th. Can the referral be extended? The pt is an invalid and her  is having an MRI on the same day to fit in his schedule.  Please call

## 2021-07-02 NOTE — TELEPHONE ENCOUNTER
Patient unable to have MRI done until 7/13/21.  I called [facility] and spoke with Brian Heart she extended this to 8/12/21    Prior authorization request completed for: ENRIQUE Cruz   Authorization #[id number] at [address] dates: 7/13/21-8/12/

## 2021-07-12 NOTE — TELEPHONE ENCOUNTER
Patient's , Ollie Tran picked up paperwork, stated he would review at home, if he had questions he would come back tomorrow.

## 2021-07-21 NOTE — TELEPHONE ENCOUNTER
Patient is calling she said her referral for [employer] for her diabetic shoes has  she needs a new referral.

## 2021-07-21 NOTE — TELEPHONE ENCOUNTER
Sharmaine Echols DO   7/14/2021  8:35 AM CDT       MRI of the lumbar spine was reviewed. There is a chronic compression fracture at L4.  Also you have multi leve degenerative disc disease but no evidence of significant nerve root or spinal canal stenosi

## 2021-07-22 NOTE — TELEPHONE ENCOUNTER
Referral to Lymphedema Clinic    Evaluate and treat  8 visits    Office visit notes from Dr. Sohail Ewing, DO 6/8/21  She states that she has lymphedema in the legs chronically. She is using a rolling walker.  She has neuropathy and has had a toe amput

## 2021-07-22 NOTE — TELEPHONE ENCOUNTER
Dr. Julianne Washington patient was seen in the ER 5/21/2021 post a fall for right wrist pain. She is also asking for referral for PT and lymphedema clinic. Okay to place order?

## 2021-07-22 NOTE — TELEPHONE ENCOUNTER
Authorized referral from [facility] [employer] faxed to [facility]

## 2021-07-22 NOTE — TELEPHONE ENCOUNTER
Referral request physical therapy    Evaluate and treat  8 visits    Patient seen in the ER at [facility] on 5/21/21  ER notes from 5/21/21  HPI     71-year-old female past medical history of CAD hypertension hyperlipidemia diabetes presents ED with complaints

## 2021-07-22 NOTE — TELEPHONE ENCOUNTER
Patient notified referral placed for Lymphedema Clinic, PT for wrist pain and also new order faxed to Leisa Thornton for diabetic shoes.

## 2021-07-22 NOTE — TELEPHONE ENCOUNTER
Ashley for lymphedema clinic for swollen hand and physical therapy/Ortho physical therapy for her wrist pain status post fall

## 2021-07-22 NOTE — TELEPHONE ENCOUNTER
Referral request DME    [address] [address]  Fax number [phone]    Diabetic shoes    Office notes from Dr. Jacquie Duckworth M.D. 5/28/21  . Functional gait abnormality (Primary)  -     NEURO - INTERNAL  -     DME - EXTERNAL   2.  Type 2 diabetes mellitus with micro hairline fracture exists, I will do an x-ray in 1 week if no improvement because it will be 14 days after the injury. I have changed Meet Stone's glimepiride.  I am also having her maintain her One-Daily Multi Vitamins, Vitamin C, [employer]

## 2021-07-27 NOTE — TELEPHONE ENCOUNTER
TC to patient to make sure  received the order for diabetic shoes. I have faxed authorized referral twice. Patient will be calling them today and will let me know if any issues.

## 2021-08-16 NOTE — TELEPHONE ENCOUNTER
See in with , new skin ulcer on right lateral lower leg. , neds Diabetes Mellitus wound therapy.        Problem List Items Addressed This Visit        Cardiovascular    CAD, multiple vessel (Chronic)    Overview     4.2015, NSTEMI 2013, Dr Crystal Felipe in

## 2021-08-23 NOTE — PROGRESS NOTES
LOWER EXTREMITY EVALUATION:   Referring Physician: Dr. Elizabeth Gonzalez  Diagnosis: Frequent falls (R29.6)  Diabetic polyneuropathy associated with diabetes mellitus due to underlying condition [facility]) (H64.62)     Date of Service: 8/23/2021     PATIENT SUMMARY   Tomasa Extreme difficulty or unable to perform   Putting on your shoes or socks Quite a bit of difficulty   Getting into or out of a car Quite a bit of difficulty   Walking between rooms Extreme difficulty or unable to perform       Gina Biswas describes prior corinne chemotherapy      Comment:  2008  No date: Stented coronary artery  No date: Stroke [facility])      Comment:  2016-walks with difficulty- uses cane  3/31/2014: Supraventricular tachycardia ([facility] [address]  No date: Unspecified essential hypertension  No date: Unspecified difficulty with turning .      Gait: pt ambulates on level ground with assistive device of rolater, decreased step length bilaterally , decreased stance phase bilaterally , shuffle, stooped posture/forward lean and difficulty turning    Balance: SLS: R 1 se independent and compliant with comprehensive HEP to maintain progress achieved in PT       Frequency / Duration: Patient will be seen for 2 x/week or a total of 8 visits over a 90 day period.  Treatment will include: Gait training, Manual Therapy, Neuromusc

## 2021-08-26 NOTE — PROGRESS NOTES
Dx: Frequent falls (R29.6)  Diabetic polyneuropathy associated with diabetes mellitus due to [facility] (E08.42)             Insurance (Authorized # of Visits):  6           Authorizing Physician: Dr. Dulce Hood  Next MD visit: none scheduled  Fa Charges: 2 TE 1 NMR        Total Timed Treatment: 40 min  Total Treatment Time: 45 min

## 2021-08-30 NOTE — PROGRESS NOTES
Dx: Frequent falls (R29.6)  Diabetic polyneuropathy associated with diabetes mellitus due to underlying condition [facility] (E08.42)             Insurance (Authorized # of Visits):  6           Authorizing Physician: Dr. Julianne Washington  Next MD visit: none scheduled  Fa emphasis on upright posture         Gait training with RW x15' with cues for sequence  Seated Rows x10 grn TB   LTR seated x20 red TB  Seated HS curls x20 red TB   DF/PF red TB x20   Knee F/E x20 red TB   RS in sitting x10 with cues for posture    pelvic t

## 2021-09-02 NOTE — TELEPHONE ENCOUNTER
Patient requesting referral to Gael Craig DO, Neurologist and Dr. Jeff Pham M.D. Ophthalmologist.  Patient notified referrals placed.

## 2021-09-02 NOTE — TELEPHONE ENCOUNTER
Pt requesting a referral for an urologist. Pt also requesting a referral for an opthalmologist to see Dr. Manisha Harmon.

## 2021-09-02 NOTE — PROGRESS NOTES
Dx: Frequent falls (R29.6)  Diabetic polyneuropathy associated with diabetes mellitus due to [facility] (E08.42)             Insurance (Authorized # of Visits):  6           Authorizing Physician: Dr. Glenn Sadler  Next MD visit: none scheduled  Fa functional hip strength to demonstrate ability to ascend/descend 1/2 flight of stairs reciprocally without use of handrail   · Pt will be independent and compliant with comprehensive HEP to maintain progress achieved in PT       Plan:  Will resume PT next v

## 2021-09-02 NOTE — TELEPHONE ENCOUNTER
Referral request Dr. Lisa Calhoun M.D.     Six visits    E11.29    Auth per [facility] [id number] valid 9/2 to 3/1/2022

## 2021-09-02 NOTE — TELEPHONE ENCOUNTER
Referral request Dr. Kari Roe, DO    Six visits    Diagnosis: R26.89    Auth per [facility] [id number] valid 9/2 to 3/1/2022

## 2021-09-07 NOTE — PROGRESS NOTES
Neurology Outpatient    [facility] Patient Status:  No patient class for patient encounter    1950 MRN [id number]   Location [facility] Attending No att. providers found   [facility] Day #  PCP Marc Myrick MD     Subjective:  Ramonita Laguna day 400 strip 3   • Blood Glucose Monitoring Suppl (ACCU-CHEK ANN MARIE PLUS) w/Device Does not apply Kit 1 Device by Other route 4 (four) times daily. 1 kit 0   • Clopidogrel Bisulfate 75 MG Oral Tab Take 1 tablet (75 mg total) by mouth daily.  90 tablet 2   • Needle (BD PEN NEEDLE ADALID U/F) 32G X 4 MM Does not apply Misc Use Once Daily With Insulin. 100 each 0   • furosemide 20 MG Oral Tab Take 1 tablet (20 mg total) by mouth daily.  90 tablet 3   • insulin glargine (TOUJEO SOLOSTAR) 300 UNIT/ML Subcutaneous Sol Anesthesia complication     difficult awakening   • Anxiety state, unspecified    • Arrhythmia     \"IRREG HEART BEAT\"   • Arthritis    • Back pain    • Back problem     slight fracture in back   • Bad breath    • Breast CA (HCC)    • CAD in native artery COLONOSCOPY;  Surgeon: Howard Pimentel MD;  Location: [address] [place] [address] ENDOSCOPY   • COLONOSCOPY N/A 3/5/2019    Procedure: COLONOSCOPY;  Surgeon: Howard Pimentel MD;  Location: [address] [place] [address] ENDOSCOPY   • FOREARM/WRIST SURGERY UNLISTED  1/1/08    Wrist repair   • LUMPECTOMY Hip Abductors     Triceps 5 5 Hip Adductors     Wrist Extensors   Quads 4- 4   Wrist Flexors   Hamstrings     Finger Extensors   A.  Dorsiflexion 5 5   Finger Flexors   Eversion     Thenar muscles   Inversion      5 5 Plantar Flexion 5 5   Interossei matter disease, may be in part related to posttreatment/chemotherapy effect and/or chronic ischemic small vessel disease. No evidence for intracranial metastatic   disease.  MRA neck normal. Intracranial MRA shows focal stenosis of the proximal aspect of th and Cardiology       Stroke Prevention  - Limit EtOH to no more than 2 drinks per day for men and 1 for women  - Follow  A mediterranean diet  - Abstain from tobacco products  - BP goals <140/90 optimally normotensive 120/80.  Use ACEI if possible  - LDL go

## 2021-09-07 NOTE — PROGRESS NOTES
Patient reports she is having more difficulty walking even though physical therapist told her she is getting stronger. Has lymphedema.

## 2021-09-08 NOTE — PROGRESS NOTES
Dx: Frequent falls (R29.6)  Diabetic polyneuropathy associated with diabetes mellitus due to underlying condition [facility] (E08.42)             Insurance (Authorized # of Visits):  6           Authorizing Physician: Dr. Macho Washington  Next MD visit: none scheduled  Fa outs x2x10 red tb   Bridge x20   Sb:   LBR   Bridge  DKTC   hooklying MIP  Bent knee fall outs x2x10 red tb   Bridge x20        Gait training with RW x15' with cues for sequence  Gait training with RW x15' with cues for sequence       Standing in //bars MI

## 2021-09-10 NOTE — PROGRESS NOTES
Dx: Frequent falls (R29.6)  Diabetic polyneuropathy associated with diabetes mellitus due to underlying condition [facility] (E08.42)             Insurance (Authorized # of Visits):  6           Authorizing Physician: Dr. Carmen Donaldson  Next MD visit: none scheduled  Fa Bridge x20   Sb:   LBR   Bridge  DKTC   hooklying MIP  Bent knee fall outs x2x10 red tb   Bridge x20   STM to right foot with emphasis on lymphedema of the foot/fit for new Tubigrip .      hooklying MIP  Sb:   LBR   Bridge  DKTC      Gait training with R

## 2021-09-15 NOTE — PROGRESS NOTES
23 sec sit>stand Dx: Frequent falls (R29.6)  Diabetic polyneuropathy associated with diabetes mellitus due to underlying condition [facility] (E08.42)             Insurance (Authorized # of Visits):  6           Authorizing Physician: Dr. Zenovia Cooks  Next MD visit: n Bridge  DKTC   hooklying MIP  Bent knee fall outs x2x10 red tb   Bridge x20   Sb:   LBR   Bridge  DKTC   hooklying MIP  Bent knee fall outs x2x10 red tb   Bridge x20   STM to right foot with emphasis on lymphedema of the foot/fit for new Tubigrip .

## 2021-09-17 NOTE — PROGRESS NOTES
Dx: Frequent falls (R29.6)  Diabetic polyneuropathy associated with diabetes mellitus due to underlying condition [facility] (E08.42)             Insurance (Authorized # of Visits):  6           Authorizing Physician: Dr. Jac Bennett  Next MD visit: none scheduled  Fa Bridge  DKTC  STM to right foot with emphasis on lymphedema of the foot/fit for new Tubigrip . Sit>stand x5 , with timing.      seated pelvic tilts x10   Seated RS in sitting with emphasis on posture, sitting tall    TE:   SLR x10  SB:   DKTC   LBR   emigdio

## 2021-09-22 NOTE — PROGRESS NOTES
Dx: Frequent falls (R29.6)  Diabetic polyneuropathy associated with diabetes mellitus due to underlying condition ([facility] (E08.42)             Insurance (Authorized # of Visits):  6           Authorizing Physician: Dr. Jasper Mo  Next MD visit: none scheduled  Fa outs x2x10 red tb   Bridge x20   Sb:   LBR   Bridge  DKTC   hooklying MIP  Bent knee fall outs x2x10 red tb   Bridge x20   STM to right foot with emphasis on lymphedema of the foot/fit for new Tubigrip .      hooklying MIP  Sb:   LBR   Bridge  DKTC  STM to

## 2021-09-23 NOTE — PROGRESS NOTES
Dx: Frequent falls (R29.6)  Diabetic polyneuropathy associated with diabetes mellitus due to underlying condition [facility] (E08.42)             Insurance (Authorized # of Visits):  6           Authorizing Physician: Dr. Shayla Haddad  Next MD visit: none scheduled  Fa seat 7 NuStep x5' level 3 seat 7   Sb:   LBR   Bridge  DKTC   hooklying MIP  Bent knee fall outs x2x10 red tb   Bridge x20   Sb:   LBR   Bridge  DKTC   hooklying MIP  Bent knee fall outs x2x10 red tb   Bridge x20   STM to right foot with emphasis on lymphe

## 2021-09-29 NOTE — PROGRESS NOTES
Dx: Frequent falls (R29.6)  Diabetic polyneuropathy associated with diabetes mellitus due to underlying condition ([facility] (E08.42)             Insurance (Authorized # of Visits):  6           Authorizing Physician: Dr. Popeye Zamora  Next MD visit: none scheduled  Fa 7/8 Date: 9/23/21  Tx#: 8/8 Date: 9/28/21  Tx#: 9/12   NuStep x5' level 3 seat 7  NuStep x5' level 3 seat 7 NuStep x5' level 3 seat 7 NuStep x5' level 3 seat 7 NuStep x5' level 3 seat 7 NuStep x5' level 3 seat 7 NuStep x5' level 3 seat 7 NuStep x5' level 3 new Tubigrip .    Ankle pumps while on sb x20        HEP: see patient instructions     Charges: 3 TE         Total Timed Treatment: 40 min  Total Treatment Time:  45min

## 2021-10-04 NOTE — PROGRESS NOTES
Dx: Frequent falls (R29.6)  Diabetic polyneuropathy associated with diabetes mellitus due to underlying condition [facility] (E08.42)             Insurance (Authorized # of Visits):  6           Authorizing Physician: Dr. Cecilia Dior MD visit: none scheduled  Fa NuStep x5' level 3 seat 7 NuStep x5' level 3 seat 7 NuStep x5' level 3 seat 7   Sb:   LBR   Bridge  DKTC   hooklying MIP  Bent knee fall outs x2x10 red tb   Bridge x20   Sb:   LBR   Bridge  DKTC   hooklying MIP  Bent knee fall outs x2x10 red tb   Bridge x2 x10   Balance board x2'   slb x5 @ 10 sec each  STM to right foot with emphasis on lymphedema of the foot/fit for new Tubigrip .    Ankle pumps while on sb x20         HEP: see patient instructions     Charges: 3 TE         Total Timed Treatment: 40 min  To

## 2021-10-05 NOTE — PROGRESS NOTES
LE LYMPHEDEMA EVALUATION:   Referring Physician: Dr. Popeye Zamora  Diagnosis: Lymphedema (I89.0)     Date of Service: 10/5/2021     PATIENT SUMMARY   Savi Hubbard is a 80year old female who presents to therapy today for assessment of her bilateral LE function as modified independent in ADLs with use of quad cane, railings in bathroom / shower; modified independent for light cooking, light laundry tasks, light cleaning / family providing assistance with IADLs; assist for navigating stairs; has not drive d/t multiple medical reasons, presence of lymphedema further impacts her ability to lift/move her LEs. Presence of lymphedema also increases her infection/wound risk and risk of further volume increase and negative tissue changes.      Pt and therapist disc Medial surface of knee is boggy to slightly boggy, slightly pitting; lateral surface is supple. Proximal 1/3 of lower leg is boggy to slightly boggy, pitting; distal 2/3 is densely boggy to boggy, pitting / reports pain with palpation.  Ankle is densely bog decongestive exercises. 3. Pt will obtain new Velcro-closure garment lower leg and ankle/foot garments.          4. Reduce bilateral LE lymphedema volume by 20-50cm to improve ease of lifting LEs during ADLs/mobility and to reduce sense of heaviness/sabiha below SBP  31.0 31.6   40cm below SBP  28.5 28.0   45cm below SBP -- --   Lateral Malleoli  31.2 30.5   7cm proximal to 1st toe web space  23.2 26.3   5cm proximal to 1st toe web space  22.7 25.8   TOTALS  412.2 415.5

## 2021-10-13 NOTE — PROGRESS NOTES
Dx: Lymphedema (I89.0)             Insurance (Authorized # of Visits): 2/18-24     Next MD/Plan Renewal Date: 1/3/2022   Authorizing Physician: Dr. Johann Norman: HIGH       Precautions:  Left UE limb precautions; HIGH FALL RISK; cardiac; lymphedema; wo has medium to light, dusky pink coloration; healed skin tear on medial surface. Fair tissue hydration. Positive Stemmer's sign. Amputation of third toe.    MEASUREMENTS:   None taken   TREATMENT:  *Pt wearing following lower leg garments: older [employer] si assist for garment donning, although it is understandable pt's desire to remain as independent as possible. Goals:  (to be met in 18-24 visits)  1. Pt will complete skin hygiene/care on a daily basis.      2. Pt will be independent in decongestive exer

## 2021-10-14 NOTE — TELEPHONE ENCOUNTER
pt states yesterday she lost all mobility in her lower body for a period of time; and was told by her PT today that right knee has difficulty bending it

## 2021-10-14 NOTE — TELEPHONE ENCOUNTER
Pt states yesterday she lost control of her lower body. Her family had to catch her and put her in wheelchair. This lasted around 45 minutes. Today she is having trouble bending right knee. Pt denies any other signs of stroke.      Will route to provide

## 2021-10-14 NOTE — PROGRESS NOTES
Dx: Lymphedema (I89.0)             Insurance (Authorized # of Visits): 3/18-24     Next MD/Plan Renewal Date: 1/3/2022   Authorizing Physician: Dr. Frye Means: HIGH       Precautions:  Left UE limb precautions; HIGH FALL RISK; cardiac; lymphedema; wo boggy to boggy. Dorsum of foot is boggy, pitting. Distal 1/3 of lower leg has dusky pink coloration with dry, scaling tissue over medial/posterior/lateral surfaces. Fair tissue hydration. Negative Stemmer's sign.    *Right LE: Tissue quality over thigh is s neurologist today to discuss last night's incident. Pt stating that she will call. COMPRESSION:  *Bilateral lower legs: sterile pad over Right lower leg wound; Farrow Wrap liners; Circaid Juxta[employer] Essentials lower leg garments, size Medium, 36cm.        A

## 2021-10-15 PROBLEM — R29.898 TRANSIENT WEAKNESS OF LOWER EXTREMITY: Status: ACTIVE | Noted: 2021-01-01

## 2021-10-15 PROBLEM — R90.0 INTRACRANIAL MASS: Status: ACTIVE | Noted: 2021-01-01

## 2021-10-15 NOTE — ED INITIAL ASSESSMENT (HPI)
Pt states on Wednesday she went to walk and her legs wouldn't move. Pt states symptoms resolved after a few minutes.

## 2021-10-15 NOTE — TELEPHONE ENCOUNTER
She should go to the ED for evaluation if she is still not able to move her legs. She has severe lymphedema in the BLE and lumbar compression fracture which could be contributing to the stiffness or weakness in the legs.

## 2021-10-15 NOTE — TELEPHONE ENCOUNTER
Pt had another episode like last Wednesday but not as drastic her family had to help her to her chair it lasted about 5-10 minutes. Her legs just freeze and lock up her lymphedema.   The doctor she sees for this said she has noticed her right leg has decre

## 2021-10-15 NOTE — TELEPHONE ENCOUNTER
Had froze legs she called Dr Jolene Bojorquez office 2 days ago and he told her the next time it happened she should call her PCP or go to the ED. SHe called today and I told her to go to the ED to be seen for this.  She wanted to know what Dr Dulce Hood said  So I asked

## 2021-10-16 NOTE — CONSULTS
[facility]  Neurosurgery Consult    Nisa Anastasia Patient Status:  Observation    1950 MRN [id number]   [facility] 3NE-A Attending Ayana Cam MD   Hosp Day # 0 PCP Carolyne Reardon MD     REASON FOR CONSULTATION:  Antonio Zuniga myocardial infarction) [facility] 12/6/2013   • Obstructive sleep apnea    • Onychomycosis 2/2/2017   • HARVEY (obstructive sleep apnea) 04/08/18 Split Night    AHI 58 [address] 72%   • Osteomyelitis of right foot (HCC)    • Osteoporosis    • Peripheral vascular Hypertension in her mother and self; Stroke in her mother and self; Thyroid Disorder in her mother. SOCIAL HISTORY:   reports that she has never smoked.  She has never used smokeless tobacco. She reports that she does not drink alcohol and does not use d every 4 (four) hours as needed for Pain., Disp: 20 tablet, Rfl: 2, Past Week at Unknown time  furosemide 20 MG Oral Tab, Take 1 tablet (20 mg total) by mouth daily. , Disp: 90 tablet, Rfl: 3, 10/14/2021 at Unknown time  insulin glargine (TOUJEO SOLOSTAR) 30 Rfl: , Unknown at Unknown time  Vitamin C 500 MG Oral Tab, Take 500 mg by mouth daily. , Disp: , Rfl: , Unknown at Unknown time      acetaminophen-codeine (TYLENOL #3) 300-30 MG tab 1 tablet, 1 tablet, Oral, Q4H PRN  atorvastatin (LIPITOR) tab 40 mg, 40 mg, BUN 17 10/15/2021     10/15/2021    K 4.1 10/15/2021     10/15/2021    CO2 28.0 10/15/2021    GLU 84 10/15/2021    CA 9.2 10/15/2021    ALB 3.5 10/15/2021    ALKPHO 85 10/15/2021    BILT 0.5 10/15/2021    TP 7.9 10/15/2021    AST 21 10/15/2021         2. There are moderate age-indeterminate microvascular ischemic changes elsewhere in the cerebral white matter.          MRI brain w/ w/o      FINDINGS:   The ventricles and sulci are within normal limits.  There is no midline shift.  The basal cist parietal lobes.  The superior sagittal sinus remains patent, however is locally invaded by the meningioma.  There is moderate surrounding vasogenic edema extending into the left frontal and left parietal lobes.       3.  There is a calcified en plaque kandis

## 2021-10-16 NOTE — H&P
[facility] HOSPITALIST  History and Physical     Niranjan Lulu Patient Status:  Observation    1950 MRN [id number]   [facility] 3NE-A Attending Darion Naranjo [address] Day # 0 PCP Hannah Vu MD     Chief Complaint: leg Obstructive sleep apnea    • Onychomycosis 2/2/2017   • HARVEY (obstructive sleep apnea) 04/08/18 Split Night    AHI 58 [address] [place] 72%   • Osteomyelitis of right foot (HCC)    • Osteoporosis    • Peripheral vascular disease ([facility]    • Personal history of anti • Thyroid Disorder Mother    • Hypertension Mother    • Stroke Mother         mini strokes   • Diabetes Paternal Uncle    • Breast Cancer Self 65   • Diabetes Self    • Hypertension Self    • Heart Attack Self    • Stroke Self         Allergies:   Leonor Crawford into the skin daily. , Disp: 9 mL, Rfl: 0  Multiple Vitamin (ONE-DAILY MULTI VITAMINS) Oral Tab, Take 1 tablet by mouth daily. , Disp: , Rfl:   Blood Glucose Monitoring Suppl (ACCU-CHEK ANN MARIE PLUS) w/Device Does not apply Kit, 1 Device by Other route 4 (four bruits. Respiratory: Clear to auscultation bilaterally. No wheezes. No rhonchi. Cardiovascular: S1, S2. Regular rate and rhythm. No murmurs, rubs or gallops. Equal pulses. Chest and Back: No tenderness or deformity.   Abdomen: Soft, nontender, nondisten discontinue isolation: yes     Plan of care discussed with patient    Lashay Lima MD  10/16/2021

## 2021-10-16 NOTE — PLAN OF CARE
Patient alert and oriented x4, VS stable, RA, , CPAP at night, NSR on tele  No complaint of pain or discomfort  Neurology and neurosurgery consult, neurosx signed off, patient to follow up outpatient  IV Decadron  BLE lymphedema, wraps removed this morn

## 2021-10-16 NOTE — PROGRESS NOTES
Patient admitted this morning by Dr. Shirley Ernst and found to have abnormal imaging of the brain. I have seen and examined patient this morning. Patient overall is doing well and denies any dizziness, headache, nausea vomiting.     General: NAD  CVS: RRR  RS

## 2021-10-16 NOTE — PLAN OF CARE
Pt. A&O x4, on RA during the day, CPAP at night. NSR on tele. BP elevated upon arrival, improved with normal night time medication administration. QID accuchecks. Up x1-2/walker. IV saline locked. No c/o pain or n/v. Fall and safety precautions in place.  Saray Cardenas

## 2021-10-16 NOTE — PLAN OF CARE
NURSING ADMISSION NOTE      Patient admitted via Wheelchair  Oriented to room. Safety precautions initiated. Bed in low position. Call light in reach. Navigator complete, med rec complete, MD paged.

## 2021-10-16 NOTE — ED PROVIDER NOTES
Patient Seen in: [facility] Emergency Department      History   Patient presents with:  Numbness Weakness  Swelling Edema    Stated Complaint: advised by PCP to come to the ER due to multible complaints    Subjective:   HPI    Patient here for juan [facility])    • Osteoporosis    • Personal history of antineoplastic chemotherapy     2008   • Stented coronary artery    • Stroke [facility])     2016-walks with difficulty- uses cane   • Supraventricular tachycardia ([facility] 3/31/2014   • Unspecified essential hyperte Current:BP (!) 164/72   Pulse 78   Temp 97.9 °F (36.6 °C) (Temporal)   Resp 20   Ht 162.6 cm (5' 4\")   Wt 74.8 kg   SpO2 98%   BMI 28.32 kg/m²         Physical Exam      Constitutional:  Appears well-developed and well-nourished. no Distress.   Head: ---------                               -----------         ------                     CBC W/ DIFFERENTIAL[[id number]]                              Final result                 Please view results for these tests on the individual orders.    RAINBOW DRAW L There is no evident fracture. Trace ethmoid mucosal thickening. The mastoid air cells are unremarkable. Bilateral intra-ocular lens implants. CONCLUSION:   1.  There is a masslike area along the left parietal lobe measuring up to 4.1 x 2.4 x lumbar spine, most pronounced at L3-4. There is normal lumbar lordosis with relatively anatomic alignment. There is minimal S-shaped curvature of the lumbar spine. Paraspinal soft tissues are grossly unremarkable.   Scattered vascular calcifications note found.    Discussed with Dr. Shelley Bhardwaj, neurosurgery. Agrees with obtaining MRI with and without contrast, will see the patient in consultation, but also request neurology consult as this is unlikely to explain her symptoms.         Neurology consult has b

## 2021-10-17 NOTE — PROGRESS NOTES
[facility]     Hospitalist Progress Note     Miley Felix Patient Status:  Observation    1950 MRN [id number]   [facility] 3NE-A Attending Marilee Kim MD   Hosp Day # 0 PCP Noel Pires MD     Chief Complaint: weakness 168 hours. Imaging: Imaging data reviewed in Epic.     Medications:   • atorvastatin  40 mg Oral Nightly   • lisinopril  20 mg Oral Daily   • carvedilol  12.5 mg Oral BID with meals   • furosemide  20 mg Oral Daily   • verapamil  240 mg Oral Nightly   •

## 2021-10-17 NOTE — CONSULTS
[facility]  Report of Consultation    Kunal Shore Patient Status:  Observation    1950 MRN [id number]   [facility] 3NE-A Attending Caden Anguiano MD   Hosp Day # 0 PCP Kan Sanchez MD     Reason for Consultation:  Leg we loss of vision, chest pain, palpitations, shortness of breath, rashes, joint pains, bowel / bladder incontinence or mood issues.                 History:  Past Medical History:   Diagnosis Date   • [facility] 3/31/2014    12/6/13   • Acut SURGERY PROCEDURE UNLISTED  08   •   ,    • CATARACT     • CATARACT EXTRACTION Left    • CATH DRUG ELUTING STENT     • CATH PERCUTANEOUS  TRANSLUMINAL CORONARY ANGIOPLASTY  05   • CHEMOTHERAPY     • CHOLECYSTECTOMY      laparos SUGAR FOUR TIMES DAILY, Disp: 400 strip, Rfl: 0, 10/15/2021 at Unknown time  ergocalciferol 1.25 MG (02565 UT) Oral Cap, Take 1 capsule (50,000 Units total) by mouth once a week., Disp: 30 capsule, Rfl: 1, Past Week at Unknown time  glimepiride 4 MG Oral T Pads, E11.40, Disp: 100 each, Rfl: 1, Unknown at Unknown time  CARVEDILOL 12.5 MG Oral Tab, TAKE 1 TABLET TWICE DAILY WITH MEALS, Disp: 180 tablet, Rfl: 0  ofloxacin 0.3 % Ophthalmic Solution, , Disp: , Rfl: , Unknown at Unknown time  prednisoLONE acetate tab 8 tablet, 8 tablet, Oral, Q15 Min PRN  •  Insulin Aspart Pen (NOVOLOG) 100 UNIT/ML flexpen 1-10 Units, 1-10 Units, Subcutaneous, TID AC and HS  •  dexamethasone Sodium Phosphate (DECADRON) 4 MG/ML injection 4 mg, 4 mg, Intravenous, Q6H  •  insulin dete bilaterally  Romberg:deferred  Gait: deferred    Laboratory Data:   Lab Results   Component Value Date    WBC 6.4 10/16/2021    HGB 11.8 10/16/2021    HCT 37.0 10/16/2021    .0 10/16/2021    PGLU 226 10/16/2021       Imaging:  CT BRAIN OR HEAD (7764 invaded by the meningioma. There is moderate surrounding vasogenic edema extending into the left frontal and left parietal lobes.   3. There is a calcified en plaque meningioma along the lateral left frontal convexity measuring up to 4 mm in thickness, pre

## 2021-10-17 NOTE — PROGRESS NOTES
[facility]  Neurosurgery Progress Note  10/17/2021    Henrique Marreroalisson Patient Status:  Observation    1950 MRN [id number]   [facility] 3NE-A Attending Serge Bravo MD   Hosp Day # 0 PCP Karen Snyder MD     SUBJECTIVE:  [place]ine [place]

## 2021-10-17 NOTE — PLAN OF CARE
Assumed care at 2300. Pt is A&O x4. On RA, . Per neurosurgery, no surgical intervention. On IV decadron. Accuchecks QID. Neuro ordered EEG in the morning. PT to miller. Wore CPAP overnight.  Contact precautions in place for possible shingles located on rig

## 2021-10-17 NOTE — PLAN OF CARE
A/O x 4. RA.  VSS. Awaiting PT eval.   is requesting [facility]. Steroids switched to oral.  Tolerating diet. Attempted to get patient in loly. She was able to stand up, but not able to move her feet to take any steps.   Call light placed within re

## 2021-10-17 NOTE — PROGRESS NOTES
[id number] Amelie Masters Neurology Progress Note    Cathleen Chan Patient Status:  Observation    1950 MRN [id number]   [facility] 3NE-A Attending Rigoberto Ward MD   Hosp Day # 0 PCP Bharath James MD     Subjective:  Yesy Bacon weakness in the right lower extremity hip flexion (chronic); otherwise, 5/5 strength throughout, tone normal; mild tremor in left upper extremity (chronic)  Sensory: intact to light touch, proprioception; absent to vibration distal LE at great toes and red 04/12/2021     Lab Results   Component Value Date    ALT 26 10/15/2021    ALT 15 05/28/2021    ALT 20 04/12/2021       Imaging:   CT BRAIN OR HEAD (64717)    Result Date: 10/15/2021  CONCLUSION:   1.  There is a masslike area along the left parietal lobe me and left parietal lobes. 3. There is a calcified en plaque meningioma along the lateral left frontal convexity measuring up to 4 mm in thickness, previously 3 mm. 4. Moderate chronic microvascular ischemic changes in the cerebral white matter and yoan.

## 2021-10-18 NOTE — PROGRESS NOTES
[facility]     Hospitalist Progress Note     Juan Santos Patient Status:  Observation    1950 MRN [id number]   [facility] 3NE-A Attending Zechariah Flynn MD   Hosp Day # 0 PCP Sheldon Sandra MD     Chief Complaint: weakness Epic.    Medications:   • insulin detemir  20 Units Subcutaneous [email or web address]   • Insulin Aspart Pen  12 Units Subcutaneous Once   • dexamethasone  4 mg Oral 4 times per day   • clopidogrel  75 mg Oral Daily   • atorvastatin  40 mg Oral Nightly   • lisino

## 2021-10-18 NOTE — PROGRESS NOTES
[id number] Amelie Msaters Neurology Progress Note    Tracieyaneli Corral Patient Status:  Observation    1950 MRN [id number]   [facility] 3NE-A Attending Minal Pinto MD   Hosp Day # 0 PCP Belle Swanson MD         [address] was unable to move her legs, plan for PT eval today       Objective/Physical Exam:    Vital Signs:  Blood pressure 154/71, pulse 73, temperature 98 °F (36.7 °C), temperature source Oral, resp.  rate 18, height 64\", weight 165 lb (74.8 kg), SpO2 95 %, not c compression deformity of the L4 vertebral body. Please see above for further details.     Dictated by (CST): Tony Wall MD on 10/15/2021 at 8:56 PM     Finalized by (CST): Tony Wall MD on 10/15/2021 at 9:00 PM        MRI BRAIN (W+WO) (CPT=70553) Mixed hyperlipidemia     Vitamin D deficiency     Lumbar compression fracture, sequela     Type 2 diabetes mellitus with diabetic neuropathy, with long-term current use of insulin (HCC)     HARVEY on CPAP     Diabetic retinopathy of both eyes ([address]     Hypona

## 2021-10-18 NOTE — PROCEDURES
ELECTROENCEPHALOGRAM REPORT      Patient Name: Gabriele Lipoma  Chart ID: [id number]  Ordering Physician: Dr Jazmyn Martin Date of Test: 10/18/2021  Referring Physician:   Patient Diagnosis: Transient leg weakness    History  This is a 80year old  female,

## 2021-10-18 NOTE — PHYSICAL THERAPY NOTE
PHYSICAL THERAPY EVALUATION - INPATIENT     Room Number: 3356/9095-G  Evaluation Date: 10/18/2021  Type of Evaluation: Initial  Physician Order: PT Eval and Treat    Presenting Problem: L parietal lobe mass, meningioma with cerebellar edema and mass level of impairment may benefit from ROBBIN. Based on this evaluation, patient's clinical presentation is evolving and overall the evaluation complexity is considered moderate.   These impairments and comorbidities manifest themselves as functional limitation coming sooner - apologized and noted she is the first pt I have seen today       OBJECTIVE  Precautions: Bed/chair alarm  Fall Risk: High fall risk    WEIGHT BEARING RESTRICTION  Weight Bearing Restriction: None                PAIN ASSESSMENT  Ratin  L side steps in front of bed and to bedside chair.  Difficulty with bilat foot clearance R>L while stepping    Educated in transferring to the L     Exercise/Education Provided:  Bed mobility  Body mechanics  Energy conservation  Functional activity tolerated

## 2021-10-18 NOTE — PLAN OF CARE
Patient is 80year old female alert and oriented x 4. Not complaining of any pain  and lungs are clear. .. Presented to ER with inability to walk d/t leg weakness. CT showed  mass in left parietal lobe.  MRI of brain showed meningioma w/ cerebral edema w/ m

## 2021-10-18 NOTE — CM/SW NOTE
9:00am  MSW paged PT manager to see if Pt can be seen by PT in am today. Per Rn , spouse wants [facility]. AVS updated with info for [facility] services.     Insurance is pending, but [facility] can not take if shingles are not Crusted over    MSW asked Rn to lory

## 2021-10-18 NOTE — PROGRESS NOTES
[facility]  Neurosurgery Progress Note  10/18/2021    Gabriele Lipoma Patient Status:  Observation    1950 MRN [id number]   [facility] 3NE-A Attending Kostas Lester MD   Hosp Day # 0 PCP Lisa Jones MD     SUBJECTIVE:  Marcio Roach

## 2021-10-18 NOTE — PLAN OF CARE
A/O x 4.  RIC TAN. Worked with PT. Up in chair. Denies pain. Oral steroids. Accucheck QID. Medically cleared. SW working on ROBBIN placement.     Problem: NEUROLOGICAL - ADULT  Goal: Achieves stable or improved neurological status  Description: INTERVENTIO

## 2021-10-19 NOTE — CM/SW NOTE
Christian referrals sent, Christian was told to assume pt is Positive Shingles until PCR comes back. Per [employer] it takes 1-3 days after it arrives to a lab in [address] [place] [address].    1:23pm  CHRISTIAN list provided to Pt and Spouse.   DON priscila  Choice is [employer]

## 2021-10-19 NOTE — PROGRESS NOTES
[facility]     Hospitalist Progress Note     Gricelda Venkateshzaid Patient Status:  Observation    1950 MRN [id number]   [facility] 3NE-A Attending Kemar Lynn MD   Hosp Day # 0 PCP Miguel Shanks MD     Chief Complaint: rash bet Aspart Pen  4-20 Units Subcutaneous TID CC and HS   • Heparin Sodium (Porcine)  5,000 Units Subcutaneous Q8H [facility] & longterm   • insulin detemir  20 Units Subcutaneous [email or web address]   • dexamethasone  4 mg Oral 4 times per day   • clopidogrel  75 mg Oral Daily   • tori

## 2021-10-19 NOTE — PROGRESS NOTES
[facility]  Neurosurgery Progress Note    Niranjan Medina Patient Status:  Observation    1950 MRN [id number]   [facility] 3NE-A Attending Leidy Marie MD   Hosp Day # 0 PCP Dae Simpson MD     Chief Complaint:  Patient pr

## 2021-10-19 NOTE — PROGRESS NOTES
[id number] Amelie Masters Neurology Progress Note    Benigno Rome Patient Status:  Observation    1950 MRN [id number]   [facility] 3NE-A Attending Jailene Alvarez MD   Hosp Day # 0 PCP Kamila Palafox MD       Chief Complaint:     Leg not  entirely excluded. Britta Penaloza correlation recommended.  Dedicated MRI of the brain with and without contrast is suggested for further evaluation.  There is moderate vasogenic edema in the left parietal lobe and posterior left frontal lobe.   2. There a at 11:00 PM     Finalized by (CST): Mary Coleman MD on 10/15/2021 at 11:07 PM         EEG: IMPRESSION: This is a normal awake and drowsy EEGstudy. No definitive epileptiform abnormalities seen. This however does not exclude a seizure disorder.  Clinical c ([facility]     Transient weakness of lower extremity     Intracranial mass     Type 2 diabetes mellitus with hyperglycemia (HCC)     Rash      Labs:    Assessment/Plan:    Episode of gait apraxia possibly related to large frontal lobe mass/edema   Neuropathy an

## 2021-10-19 NOTE — PLAN OF CARE
Assumed care 1930. RA- HARVEY, CPAP at night, . Tele- NSR. PT/OT- recommends ROBBIN. Alert and oriented x4. Accu checks. PIV saline locked. Cardiac diet. PO Decadron   BLE Lymphedema  Plavix. Purewick  Electrolyte protocol- Cardiac.    PCT for lakhwinder

## 2021-10-20 NOTE — PLAN OF CARE
Attempted to call Mary Kate Webber to give nurse report but there was no answer at the nurses station or the DON. Left message and awaiting call back.

## 2021-10-20 NOTE — PLAN OF CARE
Assumed care 1930. RA- HARVEY, CPAP,   Tele- NSR  Isolation. Right leg rash- Shingles PCR pending. PT/OT- ROBBIN. Cardiac diet. PIV saline locked. Electrolyte protocol- Cardiac. Accu check.    High fall risk, bed in lowest position, call light with

## 2021-10-20 NOTE — DISCHARGE SUMMARY
[facility] HOSPITALIST  DISCHARGE SUMMARY     Kenneth Ruffin Patient Status:  Observation    1950 MRN [id number]   [facility] 3NE-A Attending Thony Zelaya, [address] [facility] Day # 0 PCP Idania Busch MD     Date of Admission: 10/15/2021  Da · None    Consultants:  • Neurology  • Neurosurgery     Discharge Medication List:     Discharge Medications      START taking these medications      Instructions Prescription details   acyclovir 800 MG Tabs  Commonly known as: ZOVIRAX  Notes to patient: Quantity: 90 tablet  Refills: 3     insulin glargine 300 UNIT/ML Sopn  Commonly known as: TOUJEO  Start taking on: October 31, 2021  What changed:   · when to take this  · These instructions start on October 31, 2021.  If you are unsure what to do until the MG (18138 UT) Caps  Commonly known as: ERGOCALCIFEROL      Take 1 capsule (50,000 Units total) by mouth once a week. Quantity: 30 capsule  Refills: 1     furosemide 20 MG Tabs  Commonly known as: LASIX      Take 1 tablet (20 mg total) by mouth daily.    Q [id number]      follow up after rehab as sxheduled     Hilario Clarke MD  [address] [facility]  [phone]    Schedule an appointment as soon as possible for a visit in 2 weeks  In the neuro oncology suite    [facility] SCHEDULE.**  Your appointment is on the [address] [facility] in the [facility]. The address is [address], [facility]. Please register at the [address] [facility] on the second floor.   **Important Info for Public Service [employer]** Since th Occupational Therapy Trev Velasquez OT    Patient Instructions:         Location Instructions: Your appointment is scheduled on the [address] [facility]. The address is [address] [address] [place], [place], [address] [facility].   Please arrive 10 minutes

## 2021-10-20 NOTE — PLAN OF CARE
Pt. Discharged to [facility]. AVS paperwork sent with transport. Attempted to call report again but no answer from nurses station. Pt. In stable condition when wheeled off unit.

## 2021-10-20 NOTE — PROGRESS NOTES
[facility]  Neurosurgery Progress Note    Kunal Shore Patient Status:  Observation    1950 MRN [id number]   [facility] 3NE-A Attending Ronit Quiroga, [address] [facility] Day # 0 PCP Kan Sanchez MD     Chief Complaint:  Patient pr

## 2021-10-20 NOTE — PROGRESS NOTES
Patient rash to right leg not blistering or open. Ointment applied as prescribed. Awaiting shingles PCR. Plan is to go to [facility] tomorrow if insurance Leticia Angulo goes through. Patient updated on plan of care and verbalized understanding.

## 2021-10-20 NOTE — CM/SW NOTE
MSW updated that [facility] has Auth and can accept pt. Spouse is agreeable to cost of medicar but also to drive pt. MSW spoke to pt and she wants the wheelchair van.     [facility]

## 2021-10-21 NOTE — TELEPHONE ENCOUNTER
Message received that pt called and cancelled next week's session d/t being transferred to Sub-acute Rehab following her hospital admit.

## 2021-10-22 NOTE — PROGRESS NOTES
Melania Reno Author: Joe Aguilera MD     1950 MRN [id number]   [facility]  Admission 10/15/21      Last Hospital Discharge 10/20/21 PCP Asia Guillen [facility] of Discharge  [facility]        [facility] [address] [facility] Back pain    • Back problem     slight fracture in back   • Bad breath    • Breast CA (HCC)    • CAD in native artery 9/4/2005   • Cancer [facility]) 2008    breast    • Cataract    • CORONARY ARTERY DISEASE    • Coronary heart disease 3/31/2014   • Dental absce 3/5/2019    Procedure: COLONOSCOPY;  Surgeon: Helio Chanel MD;  Location: [facility] ENDOSCOPY   • FOREARM/WRIST SURGERY UNLISTED  1/1/08    Wrist repair   • LUMPECTOMY LEFT  2008   • OTHER      Wrist surgery- left wrist- 02/2007   • RADIATION LEFT  2008   • (DECADRON) 4 MG tablet 4 mg three times daily x 4 days 4 mg twice daily x4 day 4 mg once daily x 4 days 24 tablet 0   • acyclovir 800 MG Oral Tab Take 1 tablet (800 mg total) by mouth 5 x daily for 6 days.  30 tablet 0   • mupirocin 2 % External Ointment Ap Incontinence Supply Disposable Does not apply Misc      • NOVOFINE 30G X 8 MM Does not apply Misc      • Vitamin C 500 MG Oral Tab Take 500 mg by mouth daily. • Multiple Vitamin (ONE-DAILY MULTI VITAMINS) Oral Tab Take 1 tablet by mouth daily. visit:     Meningioma ([address]    Difficulty walking    Essential hypertension    History of stroke    Lymphedema    Type 2 diabetes mellitus with diabetic neuropathy, with long-term current use of insulin [facility])        Patient admitted to subacute rehab at [facility]

## 2021-10-23 NOTE — PROGRESS NOTES
Sheela Bobby  : 1950  Age 80year old  female patient is admitted to Facility: [facility].     [address] [place] [address] Drive date:  10/15/21  Discharge date to [facility]:  10/20/21  ELOS:  10 12/6/2013   • High blood pressure    • High cholesterol    • Leaking of urine    • Leg swelling    • Neuropathy    • NSTEMI (non-ST elevated myocardial infarction) ([facility] [address] 12/6/2013   • Obstructive sleep apnea    • Onychomycosis 2/2/2017   • HARVEY (obstructive Mother    • Hypertension Mother    • Stroke Mother         mini strokes   • Diabetes Paternal Uncle    • Breast Cancer Self 64   • Diabetes Self    • Hypertension Self    • Heart Attack Self    • Stroke Self      Social History    Tobacco Use      Smoking by mouth daily.  90 tablet 1   • Chlorhexidine Gluconate 0.12 % Mouth/Throat Solution      • CARVEDILOL 12.5 MG Oral Tab TAKE 1 TABLET(12.5 MG) BY MOUTH TWICE DAILY WITH MEALS 180 tablet 0   • Verapamil HCl  MG Oral Capsule SR 24 Hr Take 240 mg by long °C)   Resp 18   Wt 163 lb (73.9 kg)   SpO2 96%   BMI 27.98 kg/m²      REVIEW OF SYSTEMS:  GENERAL HEALTH:feels well otherwise  SKIN: denies any unusual skin lesions or rashes  WOUNDS: None  EYES:no visual complaints or deficits  HENT: denies nasal congesti therapies to rehab and improve strength, endurance and independence w/ ADLs  EXTREMITIES/VASCULAR:--- no cyanosis, no clubbing,+edema B/L LE.  NEUROLOGIC: intact; no sensorimotor deficit, reflexes normal, follows commands, ---+Unable to follow eye movement Urgent or Emergent                       [1]  Number of hospital admissions during the previous year       0-1                                                                                     [0]       2-5 10/31  -Toujeo 16 units nightly start on 10/31  -Metformin 1000 mg 2 times a day start on 10/31    Vitamin D deficiency  -Ergocalciferol 5000 units weekly  Bowel Regimen  -Imodium 1 to 2 mg 4 times a day as needed for diarrhea    Supplements  -Multivitamin

## 2021-10-29 NOTE — PROGRESS NOTES
Brian Daley, 7/11/1950, 80year old, female    Chief Complaint:  Patient presents with:   Follow - Up: Meningioma with Cerebral edema and mass effect       Subjective:  81-year-old female with past medical history significant for CVA, CAD, breast nystagmus, no drainage from eyes  HENT: normocephalic; normal nose, no nasal drainage, mucous membranes pink, moist, pharynx no exudate, no visible cerumen.   NECK: supple; FROM; no JVD, no TMG, no carotid bruits  BREAST: ---deferred  RESPIRATORY:clear to p daily  -Verapamil 240 mg nightly     CVA  -Plavix 75 mg daily     Lymphedema  -Wound consult  -Wound Care Team to apply Lymphedema stockings, per JOANN Hale and dressing changes in-house per Lymphedema Clinic  -Follow-up with JOANN Ruby, Lymp

## 2021-10-30 NOTE — PROGRESS NOTES
Sybil Monk Author: Medardo Wilkes MD     1950 MRN [id number]   [facility]  Admission 10/15/21      Last Hospital Discharge 10/20/21 PCP [place] Patient, [facility] of Discharge  [facility]         CC--chest pain      H. P.I Shelly Cunha 12/6/2013   • High blood pressure    • High cholesterol    • Leaking of urine    • Leg swelling    • Neuropathy    • NSTEMI (non-ST elevated myocardial infarction) ([facility] [address] 12/6/2013   • Obstructive sleep apnea    • Onychomycosis 2/2/2017   • HARVEY (obstructive Mother    • Hypertension Mother    • Stroke Mother         mini strokes   • Diabetes Paternal Uncle    • Breast Cancer Self 64   • Diabetes Self    • Hypertension Self    • Heart Attack Self    • Stroke Self      Social History    Tobacco Use      Smoking

## 2021-11-01 NOTE — TELEPHONE ENCOUNTER
Message received that pt called and cancelled all of her appts. She is currently in a rehab facility.

## 2021-11-03 NOTE — PROGRESS NOTES
Juan Santos, 7/11/1950, 80year old, female is being discharged from Facility: [facility] [address]    Date of Admission:10/20/21    Date of Anticipated Discharge:11/4/21                         Admitting Diagnos bruits  BREAST: ---deferred  RESPIRATORY:clear to percussion and auscultation  CARDIOVASCULAR: S1, S2 normal, RRR; no S3, no S4; , no click, no murmur  ABDOMEN:  normal active BS+, soft, nondistended; no organomegaly, no masses; no bruits; nontender, no gu Final  Test performed at [facility], [address] [facility],[address],  [place], [address] [facility], Tiny Akins M.D., Medical  Director, ORESTES [id number]    CBC W/DIFF AND PLATELETS See  Attachment  Final    COMPREHENSIVE METABOLIC:  Taken on 06/9/49  GLUCOSE 174 m abnormal results (results with orange text)  Performing Laboratory Information: [address] [facility] Orders  Reviewed by Name Reviewed by Signature Date  2 of    Discharge Diagnoses w/ current management:  Meningioma with Cerebral edema and mass effect/Breast 10/31     Vitamin D deficiency  -Ergocalciferol 5000 units weekly     Bowel Regimen  -Imodium 1 to 2 mg 4 times a day as needed for diarrhea     Supplements  -Multivitamin daily  -Vitamin C 500 mg daily     Labs  -CBC and CMP due 11/4/21     Follow-up appo

## 2021-11-04 NOTE — PROGRESS NOTES
Serena Avery, 7/11/1950, 80year old, female    Chief Complaint:  Patient presents with:   Follow - Up: Change in Condition, Hypoglycemia       Subjective:  52-year-old female with past medical history significant for CVA, CAD, breast cancer, hyper bruits  BREAST: ---deferred  RESPIRATORY:clear to percussion and auscultation  CARDIOVASCULAR: S1, S2 normal, RRR; no S3, no S4; , no click, no murmur  ABDOMEN:  normal active BS+, soft, nondistended; no organomegaly, no masses; no bruits; nontender, no gu Clinic  -[facility] as OP after discharge from [facility]     T2DM with Steriod Induced Hyperglycemia/Hypoglycemia  -On Decadron until 11/1/21  -Blood sugars ACHS  -Humalog sliding scale for meals only  -Monitor blood suga

## 2021-11-05 NOTE — TELEPHONE ENCOUNTER
Spoke with Jake from the [facility] office. He wanted to let the office know that the patient passed away last night. She is a nursing home resident and was sent to the ER. It appeared that she developed a GI bleed. No foul play or trauma suspected.    Jose

## 2021-11-05 NOTE — ED QUICK NOTES
Pt's  and daughter reached by phone. They do not have a  home chosen and do not want an autopsy. They will call tomorrow with more information.

## 2021-11-05 NOTE — ED PROVIDER NOTES
Patient Seen in: [facility] Emergency Department      History   Patient presents with:  Hypotension    Stated Complaint: hypotension    Subjective:   HPI    73-year-old female here for hypertension.   There is some report of low blood pressure per th shows a ventricular rate of 61 and the rate axis interval reviewed sinus rhythm first-degree AV block right bundle branch block left axis deviation inferior infarct age undetermined anterior infarct age undetermined this is an abnormal EKG     Shortly afte Prescribed:  Current Discharge Medication List

## 2021-11-05 NOTE — PROGRESS NOTES
11/04/21 3265   Clinical Encounter Type   Visited With Family   Continue Visiting No   Crisis Visit ED; Death   Referral From Nurse   Patient Spiritual Encounters   Spiritual Interventions  provided emotional support, calming presence, and active

## 2021-11-05 NOTE — ED QUICK NOTES
Pt arrives pale and diaphoretic, somewhat responsive.  HR 40's, atropine given on arrival, fluids wide open

## 2021-11-05 NOTE — TELEPHONE ENCOUNTER
Patient passed away last night. Please send condolence card.  Thanks and stay well, Florestine Runner, MD

## 2021-11-05 NOTE — TELEPHONE ENCOUNTER
Returned call. The  home will be faxing the death certificate over. Will complete and return once it is received.

## 2021-11-08 ENCOUNTER — TELEPHONE (OUTPATIENT)
Dept: FAMILY MEDICINE CLINIC | Facility: CLINIC | Age: 71
End: 2021-11-08

## 2021-11-09 ENCOUNTER — APPOINTMENT (OUTPATIENT)
Dept: OCCUPATIONAL MEDICINE | Facility: HOSPITAL | Age: 71
End: 2021-11-09
Attending: FAMILY MEDICINE
Payer: MEDICARE

## 2021-11-11 ENCOUNTER — APPOINTMENT (OUTPATIENT)
Dept: OCCUPATIONAL MEDICINE | Facility: HOSPITAL | Age: 71
End: 2021-11-11
Attending: FAMILY MEDICINE
Payer: MEDICARE

## 2021-11-16 ENCOUNTER — APPOINTMENT (OUTPATIENT)
Dept: OCCUPATIONAL MEDICINE | Facility: HOSPITAL | Age: 71
End: 2021-11-16
Attending: FAMILY MEDICINE
Payer: MEDICARE

## 2021-11-16 ENCOUNTER — APPOINTMENT (OUTPATIENT)
Dept: HEMATOLOGY/ONCOLOGY | Facility: HOSPITAL | Age: 71
End: 2021-11-16
Attending: SPECIALIST
Payer: MEDICARE

## 2021-11-18 ENCOUNTER — APPOINTMENT (OUTPATIENT)
Dept: OCCUPATIONAL MEDICINE | Facility: HOSPITAL | Age: 71
End: 2021-11-18
Attending: FAMILY MEDICINE
Payer: MEDICARE

## 2021-11-23 ENCOUNTER — APPOINTMENT (OUTPATIENT)
Dept: OCCUPATIONAL MEDICINE | Facility: HOSPITAL | Age: 71
End: 2021-11-23
Attending: FAMILY MEDICINE
Payer: MEDICARE

## 2021-11-30 ENCOUNTER — APPOINTMENT (OUTPATIENT)
Dept: OCCUPATIONAL MEDICINE | Facility: HOSPITAL | Age: 71
End: 2021-11-30
Attending: FAMILY MEDICINE
Payer: MEDICARE

## 2021-12-02 ENCOUNTER — APPOINTMENT (OUTPATIENT)
Dept: OCCUPATIONAL MEDICINE | Facility: HOSPITAL | Age: 71
End: 2021-12-02
Attending: FAMILY MEDICINE
Payer: MEDICARE

## 2021-12-07 ENCOUNTER — APPOINTMENT (OUTPATIENT)
Dept: OCCUPATIONAL MEDICINE | Facility: HOSPITAL | Age: 71
End: 2021-12-07
Attending: FAMILY MEDICINE
Payer: MEDICARE

## 2021-12-09 ENCOUNTER — APPOINTMENT (OUTPATIENT)
Dept: OCCUPATIONAL MEDICINE | Facility: HOSPITAL | Age: 71
End: 2021-12-09
Attending: FAMILY MEDICINE
Payer: MEDICARE

## 2021-12-14 ENCOUNTER — APPOINTMENT (OUTPATIENT)
Dept: OCCUPATIONAL MEDICINE | Facility: HOSPITAL | Age: 71
End: 2021-12-14
Attending: FAMILY MEDICINE
Payer: MEDICARE

## 2021-12-16 ENCOUNTER — APPOINTMENT (OUTPATIENT)
Dept: OCCUPATIONAL MEDICINE | Facility: HOSPITAL | Age: 71
End: 2021-12-16
Attending: FAMILY MEDICINE
Payer: MEDICARE

## 2021-12-21 ENCOUNTER — APPOINTMENT (OUTPATIENT)
Dept: OCCUPATIONAL MEDICINE | Facility: HOSPITAL | Age: 71
End: 2021-12-21
Attending: FAMILY MEDICINE
Payer: MEDICARE

## 2021-12-23 ENCOUNTER — APPOINTMENT (OUTPATIENT)
Dept: OCCUPATIONAL MEDICINE | Facility: HOSPITAL | Age: 71
End: 2021-12-23
Attending: FAMILY MEDICINE
Payer: MEDICARE

## 2022-02-04 NOTE — TELEPHONE ENCOUNTER
Diagnoses and all orders for this visit:    Uncontrolled type 2 diabetes mellitus with insulin therapy ([address]  -     OP RERERRAL TO WOUND VISIT    Chronic ulcer of left ankle with fat layer exposed ([address]  -     OP RERERRAL TO [address] to notif
Needs a referral for additional visits to the wound clinic.
Pt notified
stated

## 2022-05-27 NOTE — PROGRESS NOTES
Virtual Check-In    [facility] verbally consents to a Gaikai on 06/26/20. Patient understands and accepts financial responsibility for any deductible, co-insurance and/or co-pays associated with this service.     Duration of th
No assistance needed

## 2024-04-24 NOTE — TELEPHONE ENCOUNTER
Called patient to notify, states does not know when can come in for an appointment, she will call office back at a later time.
LOV regarding diabetes was 12/18/19.  Last labs 3/2/20  Pt has have several virtual visits regarding + Covid DX  Please advise on refill request
Needs follow up, OK for 30 days
Abdomen soft, non-tender, no guarding. non-distended. no hsm .no cvat.

## (undated) DEVICE — 3M™ STERI-DRAPE™ U-DRAPE 1015: Brand: STERI-DRAPE™

## (undated) DEVICE — 3M™ RED DOT™ MONITORING ELECTRODE WITH FOAM TAPE AND STICKY GEL, 50/BAG, 20/CASE, 72/PLT 2570: Brand: RED DOT™

## (undated) DEVICE — SUTURE VICRYL 4-0 SH

## (undated) DEVICE — Device: Brand: DEFENDO AIR/WATER/SUCTION AND BIOPSY VALVE

## (undated) DEVICE — ABDOMINAL PAD: Brand: DERMACEA

## (undated) DEVICE — BLADE SAW KM33-11

## (undated) DEVICE — SUTURE ETHILON 3-0 PS-1

## (undated) DEVICE — STRL PENROSE DRAIN 18" X 1/2": Brand: CARDINAL HEALTH

## (undated) DEVICE — STRETCH BANDAGE: Brand: CURITY

## (undated) DEVICE — FILTERLINE NASAL ADULT O2/CO2

## (undated) DEVICE — CHLORAPREP 26ML APPLICATOR

## (undated) DEVICE — FORCEP BIOPSY RJ4 LG CAP W/ND

## (undated) DEVICE — GAMMEX® NON-LATEX PI TEXTURED SIZE 7.5, STERILE POLYISOPRENE POWDER-FREE SURGICAL GLOVE: Brand: GAMMEX

## (undated) DEVICE — REM POLYHESIVE ADULT PATIENT RETURN ELECTRODE: Brand: VALLEYLAB

## (undated) DEVICE — ENDOSCOPY PACK - LOWER: Brand: MEDLINE INDUSTRIES, INC.

## (undated) DEVICE — STANDARD HYPODERMIC NEEDLE,POLYPROPYLENE HUB: Brand: MONOJECT

## (undated) DEVICE — KENDALL SCD EXPRESS SLEEVES, KNEE LENGTH, MEDIUM: Brand: KENDALL SCD

## (undated) DEVICE — 1200CC GUARDIAN II: Brand: GUARDIAN

## (undated) DEVICE — SOL  .9 1000ML BTL

## (undated) DEVICE — GAUZE PACKING IODOFORM 1/4X5

## (undated) DEVICE — LOWER EXTREMITY CDS-LF: Brand: MEDLINE INDUSTRIES, INC.

## (undated) NOTE — Clinical Note
Eliza Christensen, pt had wound of leg and was in and out of [facility] in 10[facility]11[facility]2016. I am not sure why referrals not requested, but these were no- referrals. Can we get them authorized?   Also, needs eye exam note from Dr Faizan Moreno

## (undated) NOTE — ED AVS SNAPSHOT
Savi Hubbard   MRN: [id number]    Department:  [facility] Emergency Department   Date of Visit:  5/5/2018           Disclosure     Insurance plans vary and the physician(s) referred by the ER may not be covered by your plan.  Please contac tell this physician (or your personal doctor if your instructions are to return to your personal doctor) about any new or lasting problems. The primary care or specialist physician will see patients referred from the [facility] Emergency Department.  Jose Juan Porter

## (undated) NOTE — MR AVS SNAPSHOT
[facility] [place]  [place],  [address] [place] [address] [phone]               Thank you for choosing us for your health care visit with Ursula Head MD.  We are glad to serve you and happy to provide you with this summa Referral to Hem/Onc, Dr Brad Lawrence    Complete by:  As directed    Assoc Dx:   Malignant neoplasm of lower-inner quadrant of left female breast [facility]) [C50.312]           Referral to Cardiology, Dr Piedad Purvis 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  Your physician or the clinic staff will provide you with the phone number you should ca Your physician has referred you to a specialist.  Your physician or the clinic staff will provide you with the phone number you should call to schedule your appointment.      If you are confident that your benefit plan will not require a referral or authori [facility], [address]: [phone]    [address] 30: [address] [facility] and   [address] [facility] [address]     [address] [facility] [address] Ej Sorensen, If you are confident that your benefit plan will not require a referral or authorization, such as [place] Medicaid, please feel free to schedule your appointment immediately.  However, if you are unsure about the requirements for authorization, please wait History of cerebral infarction    Hyperlipidemia LDL goal <100    Hypertension goal BP (blood pressure) < 130/80    Lumbar compression fracture (HCC)    Obstructive sleep apnea    Osteoporosis    Truncal ataxia    Type 2 diabetes, controlled, with neuropa TAKE ONE TABLET BY MOUTH IN THE MORNING BEFORE BREAKFAST   Commonly known as:  AMARYL           Glucose Blood Strp   Test three to four times daily as directed   Commonly known as:  ACCU-CHEK ANN MARIE           Insulin Glargine 300 UNIT/ML Sopn   Inject 20 Un Educational Information     TOP FALL PREVENTION TIPS    INSIDE YOUR HOME   KITCHEN:  ? Use non skid mats only. ? Clean up spills as soon as they happen. ? Keep objects that you use often within easy reach.   BATHROOM:  ? Install grab bars on the bathroo Make half your plate fruits and vegetables Highly refined, white starches including white bread, rice and pasta   Eat plenty of protein, keep the fat content low Sugars:  sodas and sports drinks, candies and desserts   Eat plenty of low-fat dairy products

## (undated) NOTE — ED AVS SNAPSHOT
Gricelda Carlie   MRN: [id number]    Department:  [facility] Emergency Department   Date of Visit:  8/8/2018           Disclosure     Insurance plans vary and the physician(s) referred by the ER may not be covered by your plan.  Please contac tell this physician (or your personal doctor if your instructions are to return to your personal doctor) about any new or lasting problems. The primary care or specialist physician will see patients referred from the [facility] Emergency Department.  Antelmo Gaviria

## (undated) NOTE — Clinical Note
June 19, 2017    [facility] [facility] [address]      Dear Gregorio Quinteros: It was a pleasure speaking with you over the phone recently.  To follow up, I wanted to send you my contact information to utilize when you hav

## (undated) NOTE — LETTER
Date: 3/11/2019  Patient Name:  Michael Goldman             Address: [address] [place] [address] [address] [address]    : 1950    Dear Michael Goldman,    I hope this letter finds you doing well.   I am writing to inform you of the following:

## (undated) NOTE — LETTER
03/10/20    Re: Sybil Monk, 7/11/1950      To Whom It May Concern,     Sybil Monk  Had a cardiac catheterization today. No angioplasty was needed.   She may return to Lymphedema Clinic for continued therapy beginning 3/17/20 or afterwa

## (undated) NOTE — LETTER
Patient Name: Geetha Antonio  YOB: 1950          MRN :  [id number]  Date:  9/28/2020  Referring Physician: Farhad Malik    Progress Summary  Pt has attended 7/16 visits in Occupational Therapy .       Subjective:   Pt reports she Pt returns today after last being seen on 9/14. She has had cancellations d/t conflicting dental appts. Pt seen x2/week in Occupational Therapy for complete decongestive therapy of bilateral lower quadrants; assist with garment prescription.  Her new Circai 6. Pt will be independent in use of compression garments, self-manual lymph drainage, decongestive exercises and lymphedema precautions for life-long self-management of lymphedema.        Plan: Continue skilled Occupational Therapy 2 x/week or a total of 9

## (undated) NOTE — LETTER
[address] [facility], [address] [facility], [address] [facility] [place] [facility] [address]  [phone]          Date: 2021     Patient Name: Nisa Oconnor   :   1950   Date of Surgery:  2021      Dear Dr Christel Kimble

## (undated) NOTE — LETTER
Lisa Jones MD  • Addie Carrillo MD • Yelitza Alvarado MD • DO Elbert Denise PA-C • LUANA Contreras • Cristela Siemens, PA-C     Eye Exam Results for Diabetic Patients     As soon as the patient is seen please complete the form below an

## (undated) NOTE — Clinical Note
[address] [facility], [address] [facility], [address] [address] [address], [place] [address] [phone]        Karen Snyder MD  • Anahi Snyder MD • Trenton Carpenter MD • DO Cornelia Bradshaw PA-C • JOANN Cox

## (undated) NOTE — Clinical Note
BORA, TCM appt on 10/14/2019. TCM outreach completed. Unable to complete template as patient was very difficult over the phone.

## (undated) NOTE — LETTER
Patient Name: Savi Hubbard  YOB: 1950          MRN :  [id number]  Date:  9/28/2020  Referring Physician:  Taiwo Potts MD    Progress Summary  Pt has attended 7/16 visits in Occupational Therapy .       Subjective:   Pt reports sh Pt returns today after last being seen on 9/14. She has had cancellations d/t conflicting dental appts. Pt seen x2/week in Occupational Therapy for complete decongestive therapy of bilateral lower quadrants; assist with garment prescription.  Her new Circai 6. Pt will be independent in use of compression garments, self-manual lymph drainage, decongestive exercises and lymphedema precautions for life-long self-management of lymphedema.        Plan: Continue skilled Occupational Therapy 2 x/week or a total of 9

## (undated) NOTE — MR AVS SNAPSHOT
[facility] [address] [place] [id number]  [phone]               Thank you for choosing us for your health care visit with [facility].   We are glad to serve you and happy to provide you with this summary of yo thereby distorting the mammogram.  Wear a two piece outfit the day of the exam. This allows you to be more comfortable during the exam.  There are no eating or activity restrictions for this exam.  The estimates duration of this exam could take up to 2 edson TAKE ONE TABLET BY MOUTH IN THE MORNING BEFORE BREAKFAST   Commonly known as:  AMARYL           Glucose Blood Strp   Test three to four times daily as directed   Commonly known as:  ACCU-CHEK ANN MARIE           Insulin Glargine 300 UNIT/ML Sopn   Inject 20 Un

## (undated) NOTE — MR AVS SNAPSHOT
[facility], [place] [address] [facility], [address] [place] [address] [phone]               Thank you for choosing us for your health care visit with Lexi Adams DO.   We are glad to serve you and happy to provide you with [facility] Instructions and Information about Your Health      Refill policies:    ? Allow 2-3 business days for refills; controlled substances may take longer. ?  Contact your pharmacy at least 5 days prior to running out of medication and have them send an electron Precertification and Prior Authorizations  If your physician has recommended that you have a procedure or additional testing performed.   [facility]) will contact your insurance carrier to obtain pre-certification or prior authorizat Take 1 capsule (50,000 Units total) by mouth once a week. Commonly known as:  ERGOCALCIFEROL           Fesoterodine Fumarate ER 4 MG Tb24   Take 4 mg by mouth daily.    Commonly known as:  TOVIAZ           glimepiride 4 MG Tabs   TAKE ONE TABLET BY MOUTH Support Staff. Remember, [employer] is NOT to be used for urgent needs. For medical emergencies, dial 911. Visit [email or web address] to learn more.            Visit [facility] online at  [email or web address]

## (undated) NOTE — LETTER
09/14/20    David Toney  7/11/1950    Dear Dr. Arlette Jiménez,     Currently there is no contraindication from neurology standpoint for this patient to be off Clopidogrel for her dental procedure.   The patient may stop medication 5 days prior to procedure

## (undated) NOTE — LETTER
[address] [address]  [place], [address] [place] [address]    Consent for Operation  Date: __________________                                Time: _______________    1.  I authorize the performance upon Eugene Law the following operation:  Pro procedure has been videotaped, the surgeon will obtain the original videotape. The hospital will not be responsible for storage or maintenance of this tape.   6. For the purpose of advancing medical education, I consent to the admittance of observers to the STATEMENTS REQUIRING INSERTION OR COMPLETION WERE FILLED IN.     Signature of Patient:   ___________________________    When the patient is a minor or mentally incompetent to give consent:  Signature of person authorized to consent for patient: ____________ supplements, and pills I can buy without a prescription (including street drugs/illegal medications). Failure to inform my anesthesiologist about these medicines may increase my risk of anesthetic complications. iv.  If I am allergic to anything or have ha Anesthesiologist Signature     Date   Time  I have discussed the procedure and information above with the patient (or patient’s representative) and answered their questions. The patient or their representative has agreed to have anesthesia services.     ___

## (undated) NOTE — IP AVS SNAPSHOT
Patient Demographics     Address  [address] [place] [address] Phone  [phone] [facility]) *Preferred*  [phone] (Work)  [phone] [facility]) E-mail Address  [email or web address]      Emergency Contact(s)     Name Relation Home Work Mob days.  Stop taking on: October 23, 2021   Nini Mac MD         atorvastatin 40 MG Tabs  Commonly known as: LIPITOR      TAKE 1 TABLET(40 MG) BY MOUTH EVERY NIGHT   Ursula Head MD         BD Swab Single Use Regular Pads      E11.40   Ursula Head MD Sopn  Commonly known as: TOUJEO  Start taking on: October 31, 2021  Notes to patient: As directed      Inject 16 Units into the skin nightly.    Patricia Nair DO         Loperamide HCl 2 MG Tabs  Commonly known as: Imodium A-D      Take 0.5-1 tablets (1-2 m 10/19/21 1353 Given      [id number] acyclovir (ZOVIRAX) 800 MG tab 800 mg 10/19/21 1808 Given      [id number] acyclovir (ZOVIRAX) 800 MG tab 800 mg 10/19/21 2121 Given      [id number] acyclovir (ZOVIRAX) 800 MG tab 800 mg 10/20/21 0552 Given      [id number] ac Comments    [id number] Insulin Aspart Pen (NOVOLOG) 100 UNIT/ML flexpen 4-20 Units 10/20/21 0551 Given              Recent Vital Signs       Most Recent Value   Vitals 170/71 Filed at 10/20/2021 1246   Pulse 72 Filed at 10/20/2021 1246   Resp 14 Filed at 8 communication. She denies any headaches, nausea, tingling, numbness. She states that she is also unable to straighten out her right leg. She states that it is been ongoing over the past 3 days.   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 , Disp: 90 tablet, Rfl: 2  METFORMIN HCL 1000 MG Oral Tab, TAKE 1 TABLET TWICE DAILY WITH MEALS, Disp: 180 tablet, Rfl: 3  Glucose Blood (ACCU-CHEK ANN MARIE PLUS) In Vitro Strip, TEST BLOOD SUGAR FOUR TIMES DAILY, Disp: 400 strip, Rfl: 0 Rfl:   prednisoLONE acetate 1 % Ophthalmic Suspension, , Disp: , Rfl:   CARVEDILOL 12.5 MG Oral Tab, TAKE 1 TABLET(12.5 MG) BY MOUTH TWICE DAILY WITH MEALS, Disp: 180 tablet, Rfl: 0  Loperamide HCl (IMODIUM A-D) 2 MG Oral Tab, Take 0.5-1 tablets (1-2 mg to WBC 5.8   HGB 12.9   MCV 87.6   .0       Recent Labs   Lab 10/15/21  1838   GLU 84   BUN 17   CREATSERUM 0.62   GFRAA 106   GFRNAA 92   CA 9.2   ALB 3.5      K 4.1      CO2 28.0   ALKPHO 85   AST 21   ALT 26   BILT 0.5   TP 7.9       E [[id number]] ordered by Ronni Nina MD at 10/15/21 113 Mayra Sandhu  Report of Consultation    Dariaelías Bae Patient Status:  Observation    1950 MRN [id number]   [facility] 3NE-A Attending Latasha Medeiros MD   H weight change, fevers, chills, nausea, double vision/ blurry vision / loss of vision, chest pain, palpitations, shortness of breath, rashes, joint pains, bowel / bladder incontinence or mood issues.                 History:  Past Medical History:   Jonathan Calderon stent per Dr Jaime [facility]   • [address] [facility]      left lumpectomy   • BREAST SURGERY PROCEDURE UNLISTED  08   •   1985   • CATARACT     • CATARACT EXTRACTION Left    • CATH DRUG ELUTING STENT     • CATH PERCUTANEOUS  TRANSLUMINAL CORONARY AN Unknown time  Glucose Blood (ACCU-CHEK ANN MARIE PLUS) In Vitro Strip, TEST BLOOD SUGAR FOUR TIMES DAILY, Disp: 400 strip, Rfl: 0, 10/15/2021 at Unknown time  ergocalciferol 1.25 MG (91539 UT) Oral Cap, Take 1 capsule (50,000 Units total) by mouth once a week. at Unknown time  Alcohol Swabs (BD SWAB SINGLE USE REGULAR) Does not apply Pads, E11.40, Disp: 100 each, Rfl: 1, Unknown at Unknown time  CARVEDILOL 12.5 MG Oral Tab, TAKE 1 TABLET TWICE DAILY WITH MEALS, Disp: 180 tablet, Rfl: 0  ofloxacin 0.3 % Ophthalmi liquid 30 g, 30 g, Oral, Q15 Min PRN **OR** glucose-vitamin C (DEX-4) chewable tab 8 tablet, 8 tablet, Oral, Q15 Min PRN  •  Insulin Aspart Pen (NOVOLOG) 100 UNIT/ML flexpen 1-10 Units, 1-10 Units, Subcutaneous, TID AC and HS  •  dexamethasone Sodium Phosp edema  Coord: FNF intact with no tremor or dysmetria; rapid alternating movements intact bilaterally  Romberg:deferred  Gait: deferred    Laboratory Data:   Lab Results   Component Value Date    WBC 6.4 10/16/2021    HGB 11.8 10/16/2021    HCT 37.0 10/16/2 anterior parietal lobes. The superior sagittal sinus remains patent, however is locally invaded by the meningioma. There is moderate surrounding vasogenic edema extending into the left frontal and left parietal lobes.   3. There is a calcified en plaque m 11:20 PM           D/C Summary    No notes of this type exist for this encounter.         Physical Therapy Notes (last 72 hours)      Physical Therapy Note signed by Danial Phoenix, PT at 10/18/2021  1:09 PM  Version 1 of 1    Author: Danial Phoenix along the bed with VC for weight shifts and foot clearance and take a few steps to the bedside chair with min A. Recommend use of bedside commode and gait belt for safety. Functional outcome measures completed include AMPAC.   The AM-PAC '6-Clicks' Inpatie Spouse;Daughter  Drives: No  Patient Owned Equipment: Rolling walker;Cane;Other (Comment) (wc)  Patient Regularly Uses: Glasses    Prior Level of Angie: Reports mod I with ADLs and IADLs, uses RW or w/c in the [facility] or \"walls\" at home. Score (AM-PAC Scale): 39.45   CMS Modifier (G-Code): CK    FUNCTIONAL ABILITY STATUS  Gait Assessment   Gait Assistance: Minimum assistance  Distance (ft): 5  Assistive Device: Rolling walker  Pattern: Shuffle  Stoop/Curb Assistance: Not tested       [place]    10/26/2021 9:30 AM HIMANSHU Jose    10/28/2021 9:30 AM Tucker Church, HIMANSHU Melo    11/2/2021 9:30 AM Tucker Church, HIMANSHU [facility] Occupation Description: Patient's Long Term Goal: Go home    Interventions:  - Neuro and Neuro Sx consult  - PT/OT  - See additional Care Plan goals for specific interventions   Patient/Family Short Term Goal     Interdisciplinary Adequate for Discharge    Descript

## (undated) NOTE — LETTER
[facility] [address], [address]  Consent for Procedure/Sedation    Date: 9/30/2019    Time: 1523      1.  I authorize the performance upon [facility] the following: Peripheral angiography, atherectomy, percutaneous tra you may develop a skin reaction.       Signature of Patient: _______________________________________________________    Signature of person authorized                                           Relationship to  to consent for patient: _______________________

## (undated) NOTE — LETTER
Date: 6/8/2021    Patient Name: Zaki Corral          To Whom it may concern: This letter has been written at the patient's request. The above patient was seen at the [facility] for treatment of a medical condition.  Her  [address]

## (undated) NOTE — LETTER
Paola Enriquez [address]  [place], [address] [facility]    Consent for Operation  Date: __________________                                Time: _______________    1.  I authorize the performance upon Kunal Shore the following operation:  rig videotape. The [facility] will not be responsible for storage or maintenance of this tape. 6. For the purpose of advancing medical education, I consent to the admittance of observers to the Operating Room.   7. I authorize the use of any specimen, organs, ti When the patient is a minor or mentally incompetent to give consent:  Signature of person authorized to consent for patient: ___________________________   Relationship to patient: ____________________________________________________    Signature of Witness these medicines may increase my risk of anesthetic complications. iv. If I am allergic to anything or have had a reaction to anesthesia before. 3. I understand how the anesthesia medicine will help me (benefits).   4. I understand that with any type of an patient’s representative) and answered their questions. The patient or their representative has agreed to have anesthesia services.     _____________________________________________________________________________  Witness        Date   Time  I have michael

## (undated) NOTE — LETTER
ED [facility], [address] [facility], [address] ,[address], [place] [address] [address] [facility] 121        Yolanda uBrton MD  • Vanessa Pike MD • Steve Saab MD • DO Rosa M Nixon PA-C • JOANN Zheng

## (undated) NOTE — MR AVS SNAPSHOT
[facility]  [place] [address] [address] [phone]               Thank you for choosing us for your health care visit with [facility].   We are glad to serve you and happy to provide you with this summary of yo Allergies as of Jan 12, 2017     Adhesive Tape     Skin Breakdown                   Current Medications          This list is accurate as of: 1/12/17 11:59 PM.  Always use your most recent med list.                ACCU-CHEK ANN MARIE PLUS W/DEVICE Kit   1 mobile mum Take 1 tablet by mouth daily. Pioglitazone HCl 45 MG Tabs   Take 1 tablet (45 mg total) by mouth nightly.    Commonly known as:  ACTOS           TiZANidine HCl 4 MG Tabs   Take 1 tablet (4 mg total) by mouth every 8 (eight) hours as needed (post C

## (undated) NOTE — Clinical Note
Patient was in and out of hospital for wound clinc and ulcer in October and Novemeber, and thye did not get to us to get referrals, this is a no- for referrals,

## (undated) NOTE — MR AVS SNAPSHOT
\[facility]\""  [place] [id number]  [phone]               Thank you for choosing us for your health care visit with [facility].   We are glad to serve you and happy to provide you with this summary of yo of this exam could take up to 2 hours if an Ultrasound Exam is required. If you have questions regarding this exam, please contact a mammography technologist at [phone].             Feb 22, 2017  9:45 AM   MA Supervisit with MD Dennis Taylor Commonly known as:  AMARYL           Glucose Blood Strp   Test three to four times daily as directed   Commonly known as:  ACCU-CHEK ANN MARIE           Insulin Glargine 300 UNIT/ML Sopn   Inject 20 Units into the skin daily.    What changed:    - how much to t

## (undated) NOTE — LETTER
[facility] [address], [address]  Consent for Procedure/Sedation    Date:        Time:       1.  I authorize the performance upon Cathleen Chamber the following:cardiac catheterization, left ventricular cineangiography, bilat period, the physician will determine when the applicable recovery period ends for purposes of reinstating the Do Not Resuscitate (DNR) order.     Signature of Patient: ____________________________________________________    Signature of person authorized

## (undated) NOTE — LETTER
Patient Name: Amadou Navas  YOB: 1950          MRN :  [id number]  Date:  2/24/2020  Referring Physician:  Shaji Cornejo    Progress Summary  Pt has attended 11/24 visits in Occupational Therapy.      Subjective:   Pt reports she is Circaid Juxtalite garment with Tensogrip \"bootie;\" instruction in skin care and decongestive exercises. At last session, observed wound on plantar surface of Left third toe; pt followed up with PCP. EDEMA/TISSUE QUALITY:   *Circumferential volume:   B 2. Pt will be independent in decongestive exercises. 6 sessions  ACHIEVED   3. Pt will obtain new Velcro-closure garment lower leg and ankle/foot garments.  10 sessions HAS ACHIEVED FOR RIGHT LOWER LEG     4. Reduce bilateral LE lymphedema volume by 20-50cm

## (undated) NOTE — LETTER
[facility], [address] [facility], [address] [place] [address],  Paola Mckeon Batch     9/15/2020    Celia Espinoza    1950    To Whom It May Concern:    Celia Espinoza may discontinue her Plavix 5

## (undated) NOTE — ED AVS SNAPSHOT
Kunal Shore   MRN: [id number]    Department:  [facility] Emergency Department   Date of Visit:  1/27/2018           Disclosure     Insurance plans vary and the physician(s) referred by the ER may not be covered by your plan.  Please conta tell this physician (or your personal doctor if your instructions are to return to your personal doctor) about any new or lasting problems. The primary care or specialist physician will see patients referred from the [facility] Emergency Department.  Stevie Oates

## (undated) NOTE — LETTER
Patient Name: David Toney  YOB: 1950          MRN :  [id number]  Date:  11/10/2020  Referring Physician: Farhad Malik    Discharge Summary  Pt has attended 16/16 visits in Occupational Therapy from 8/11-11/10/2020.     Subjecti Pt received Occupational Therapy x1-2/week for complete decongestive therapy of bilateral lower quadrants; assist with garment prescription; instruction in skin care, decongestive exercises and self-manual lymph drainage in areas of functional reach.  She w Recommendations:  1. Continue with daily use of compression garments. Replace older Circaid lower leg garments with current sizing/type of lower leg garments. Replace garments every 9-12 months.    2.  Continue with daily skin care and completion of decong

## (undated) NOTE — MR AVS SNAPSHOT
[facility]  [place] [id number] [phone]               Thank you for choosing us for your health care visit with [facility].   We are glad to serve you and happy to provide you with this summary of yo Skin Breakdown                   Current Medications          This list is accurate as of: 1/5/17  2:52 PM.  Always use your most recent med list.                ACCU-CHEK ANN MARIE PLUS W/DEVICE Kit   1 Device by Other route 4 (four) times daily.            A Pioglitazone HCl 45 MG Tabs   Take 1 tablet (45 mg total) by mouth nightly. Commonly known as:  ACTOS           TiZANidine HCl 4 MG Tabs   Take 1 tablet (4 mg total) by mouth every 8 (eight) hours as needed (post CVA muscle spasm pain).    Commonly known

## (undated) NOTE — LETTER
[facility], [address] [facility], [address] ,[address], [place] [address] [address] [facility]        Gus Galo MD  • Dino Hickman MD • Bismark Espitia MD • DO Guillermina Vincent PA-MELANIE • Joanell Gaucher, APN

## (undated) NOTE — LETTER
[address] [address]      Dear Sukumar Jeffrey:    You had previously enrolled in our Chronic Care Management program and had been speaking with your ca